# Patient Record
Sex: FEMALE | Race: BLACK OR AFRICAN AMERICAN | NOT HISPANIC OR LATINO | Employment: FULL TIME | URBAN - METROPOLITAN AREA
[De-identification: names, ages, dates, MRNs, and addresses within clinical notes are randomized per-mention and may not be internally consistent; named-entity substitution may affect disease eponyms.]

---

## 2017-01-03 ENCOUNTER — OFFICE VISIT (OUTPATIENT)
Dept: INTERNAL MEDICINE | Facility: CLINIC | Age: 57
End: 2017-01-03
Payer: MEDICAID

## 2017-01-03 VITALS
DIASTOLIC BLOOD PRESSURE: 86 MMHG | HEIGHT: 69 IN | BODY MASS INDEX: 25.57 KG/M2 | WEIGHT: 172.63 LBS | HEART RATE: 80 BPM | SYSTOLIC BLOOD PRESSURE: 110 MMHG

## 2017-01-03 DIAGNOSIS — I10 ESSENTIAL HYPERTENSION: ICD-10-CM

## 2017-01-03 DIAGNOSIS — T78.3XXS ANGIOEDEMA OF LIPS, SEQUELA: Primary | ICD-10-CM

## 2017-01-03 DIAGNOSIS — T78.3XXS ALLERGIC ANGIOEDEMA, SEQUELA: ICD-10-CM

## 2017-01-03 PROCEDURE — 99214 OFFICE O/P EST MOD 30 MIN: CPT | Mod: S$PBB,,, | Performed by: INTERNAL MEDICINE

## 2017-01-03 PROCEDURE — 99213 OFFICE O/P EST LOW 20 MIN: CPT | Mod: PBBFAC | Performed by: INTERNAL MEDICINE

## 2017-01-03 PROCEDURE — 99999 PR PBB SHADOW E&M-EST. PATIENT-LVL III: CPT | Mod: PBBFAC,,, | Performed by: INTERNAL MEDICINE

## 2017-01-03 RX ORDER — AMLODIPINE BESYLATE 5 MG/1
5 TABLET ORAL DAILY
Qty: 30 TABLET | Refills: 12 | Status: SHIPPED | OUTPATIENT
Start: 2017-01-03 | End: 2018-02-01 | Stop reason: SDUPTHER

## 2017-01-03 RX ORDER — EPINEPHRINE 0.3 MG/.3ML
1 INJECTION SUBCUTANEOUS ONCE
Qty: 2 DEVICE | Refills: 1 | Status: SHIPPED | OUTPATIENT
Start: 2017-01-03 | End: 2017-01-03

## 2017-01-03 RX ORDER — HYDROCHLOROTHIAZIDE 12.5 MG/1
12.5 TABLET ORAL DAILY
Qty: 30 TABLET | Refills: 11 | Status: SHIPPED | OUTPATIENT
Start: 2017-01-03 | End: 2018-01-03

## 2017-01-03 NOTE — PROGRESS NOTES
Subjective:       Patient ID: Leschia T Bastian is a 56 y.o. female.    Chief Complaint: Follow-up ( appt facial swelling)    HPI  Review of Systems    Objective:      Physical Exam    Assessment:       1. Angioedema of lips, sequela    2. Allergic angioedema, sequela    3. Essential hypertension        Plan:       Leschia was seen today for follow-up.    Diagnoses and all orders for this visit:    Angioedema of lips, sequela: patient will need to look in her book for Allergists  -     Ambulatory consult to Allergy    Allergic angioedema, sequela: gave prescription for Epipen:teaching by the nurse on how to use this.  -     Ambulatory consult to Allergy    Essential hypertension: lisinopril allergy, will stop losartan because of the angioedema and change to amlodipine 5 mg and hctz 12.5 mg    Other orders  -     epinephrine (EPIPEN 2-ERICH) 0.3 mg/0.3 mL AtIn; Inject 0.3 mLs (0.3 mg total) into the muscle once.  -     ranitidine (ZANTAC) 150 MG tablet; Take 1 tablet (150 mg total) by mouth 2 (two) times daily. Patient will need a prescription for this for allergic reaction  -     amlodipine (NORVASC) 5 MG tablet; Take 1 tablet (5 mg total) by mouth once daily. Stop the losartan  -     hydrochlorothiazide (HYDRODIURIL) 12.5 MG Tab; Take 1 tablet (12.5 mg total) by mouth once daily.    Return in about 4 weeks (around 1/31/2017) for Follow up.    New Prescriptions    AMLODIPINE (NORVASC) 5 MG TABLET    Take 1 tablet (5 mg total) by mouth once daily. Stop the losartan    EPINEPHRINE (EPIPEN 2-ERICH) 0.3 MG/0.3 ML ATIN    Inject 0.3 mLs (0.3 mg total) into the muscle once.    RANITIDINE (ZANTAC) 150 MG TABLET    Take 1 tablet (150 mg total) by mouth 2 (two) times daily. Patient will need a prescription for this for allergic reaction       Modified Medications    Modified Medication Previous Medication    HYDROCHLOROTHIAZIDE (HYDRODIURIL) 12.5 MG TAB hydrochlorothiazide (HYDRODIURIL) 12.5 MG Tab       Take 1 tablet (12.5 mg  total) by mouth once daily.    Take 1 tablet (12.5 mg total) by mouth once daily.       Orders Placed This Encounter   Procedures    Ambulatory consult to Allergy     Referral Priority:   Routine     Referral Type:   Allergy Testing     Referral Reason:   Specialty Services Required     Requested Specialty:   Allergy     Number of Visits Requested:   1       Labs, studies and consults associated with this visit were reviewed

## 2017-01-03 NOTE — MR AVS SNAPSHOT
[facility]  [address]  [place] [address]  Phone: [phone]  Fax: [phone]                  Leschia T Bastian   1/3/2017 10:00 AM   Office Visit    Description:  Female : 1960   Provider:  Karina Gomez MD   Department:  [facility]           Reason for Visit     Follow-up           Diagnoses this Visit        Comments    Angioedema of lips, sequela    -  Primary     Allergic angioedema, sequela         Essential hypertension                To Do List           Future Appointments        Provider Department Dept Phone    2017 3:00 PM Karina Gomez MD [facility] [phone]      Goals (5 Years of Data)     None      Follow-Up and Disposition     Return in about 4 weeks (around 2017) for Follow up.       These Medications        Disp Refills Start End    epinephrine (EPIPEN 2-ERICH) 0.3 mg/0.3 mL AtIn 2 Device 1 1/3/2017 1/3/2017    Inject 0.3 mLs (0.3 mg total) into the muscle once. - Intramuscular    Pharmacy: [place] [id number] Ph #: [phone]       ranitidine (ZANTAC) 150 MG tablet 60 tablet 11 1/3/2017 1/3/2018    Take 1 tablet (150 mg total) by mouth 2 (two) times daily. Patient will need a prescription for this for allergic reaction - Oral    Pharmacy: [place] [id number] Ph #: [phone]       amlodipine (NORVASC) 5 MG tablet 30 tablet 12 1/3/2017 1/3/2018    Take 1 tablet (5 mg total) by mouth once daily. Stop the losartan - Oral    Pharmacy: [place] [address] Ph #: [phone]       hydrochlorothiazide (HYDRODIURIL) 12.5 MG Tab 30 tablet 11 1/3/2017 1/3/2018    Take 1 tablet (12.5 mg total) by mouth once daily. - Oral    Pharmacy: [place] [id number] Ph #: [phone]         [facility]      [facility] Care [facility] - 24/7 Assistance  Registered nurses in the [facility] provide clinical advisement, health education, appointment booking, and other advisory services.  Call for this free service at [phone].             Medications           Message regarding Medications     Verify the changes and/or additions to your medication regime listed below are the same as discussed with your clinician today.  If any of these changes or additions are incorrect, please notify your healthcare provider.        START taking these NEW medications        Refills    epinephrine (EPIPEN 2-ERICH) 0.3 mg/0.3 mL AtIn 1    Sig: Inject 0.3 mLs (0.3 mg total) into the muscle once.    Class: Normal    Route: Intramuscular    ranitidine (ZANTAC) 150 MG tablet 11    Sig: Take 1 tablet (150 mg total) by mouth 2 (two) times daily. Patient will need a prescription for this for allergic reaction    Class: Normal    Route: Oral    amlodipine (NORVASC) 5 MG tablet 12    Sig: Take 1 tablet (5 mg total) by mouth once daily. Stop the losartan    Class: Normal    Route: Oral      STOP taking these medications     omeprazole (PRILOSEC) 40 MG capsule Take 40 mg by mouth once daily.    pantoprazole (PROTONIX) 20 MG tablet Take 2 tablets (40 mg total) by mouth once daily.    losartan (COZAAR) 100 MG tablet Take 1 tablet (100 mg total) by mouth once daily.           Verify that the below list of medications is an accurate representation of the medications you are currently taking.  If none reported, the list may be blank. If incorrect, please contact your healthcare provider. Carry this list with you in case of emergency.           Current Medications     acetaminophen-codeine 300-30mg (TYLENOL #3) 300-30 mg Tab Take 1 tablet by mouth every 4 to 6 hours as needed.    amoxicillin-clavulanate 875-125mg (AUGMENTIN) 875-125 mg per tablet 1 TABLET BY MOUTH TWICE A DAY TAKE WITH FOOD    aspirin-acetaminophen-caffeine 250-250-65  "mg (EXCEDRIN MIGRAINE) 250-250-65 mg per tablet Take 1 tablet by mouth every 6 (six) hours as needed for Pain.    benzonatate (TESSALON) 100 MG capsule TAKE 1-2 CAPSULES BY MOUTH THREE TIMES A DAY AS NEEDED FOR COUGH    fexofenadine (ALLEGRA) 180 MG tablet Take 1 tablet (180 mg total) by mouth once daily.    fluticasone (FLONASE) 50 mcg/actuation nasal spray USE 2 SPRAYS IN THE NOSTRILS DAILY    loratadine (CLARITIN) 10 mg tablet Take 10 mg by mouth once daily.    methylPREDNISolone (MEDROL DOSEPACK) 4 mg tablet Take as directed    naproxen sodium (ANAPROX) 220 MG tablet Take 220 mg by mouth every 12 (twelve) hours.    nystatin-triamcinolone (MYCOLOG II) cream APPLY TO AFFECTED AREA 2 TIMES DAILY    olopatadine (PATANOL) 0.1 % ophthalmic solution Place 1 drop into both eyes 2 (two) times daily.    promethazine-dextromethorphan (PROMETHAZINE-DM) 6.25-15 mg/5 mL Syrp TAKE 5 ML BY MOUTH EVERY 4 TO 6 HOURS AS NEEDED FOR COUGH    amlodipine (NORVASC) 5 MG tablet Take 1 tablet (5 mg total) by mouth once daily. Stop the losartan    epinephrine (EPIPEN 2-ERICH) 0.3 mg/0.3 mL AtIn Inject 0.3 mLs (0.3 mg total) into the muscle once.    hydrochlorothiazide (HYDRODIURIL) 12.5 MG Tab Take 1 tablet (12.5 mg total) by mouth once daily.    ranitidine (ZANTAC) 150 MG tablet Take 1 tablet (150 mg total) by mouth 2 (two) times daily. Patient will need a prescription for this for allergic reaction           Clinical Reference Information           Vital Signs - Last Recorded  Most recent update: 1/3/2017 10:10 AM by Abigail Hernandez MA    BP Pulse Ht Wt LMP BMI    110/86 80 5' 9" (1.753 m) 78.3 kg (172 lb 9.9 oz) 12/09/2016 25.49 kg/m2      Blood Pressure          Most Recent Value    BP  110/86      Allergies as of 1/3/2017     Codeine    Shrimp    Crab      Immunizations Administered on Date of Encounter - 1/3/2017     None      Orders Placed During Today's Visit      Normal Orders This Visit    Ambulatory consult to Allergy       "

## 2017-01-09 DIAGNOSIS — G47.30 SLEEP APNEA, UNSPECIFIED: Primary | ICD-10-CM

## 2017-01-11 ENCOUNTER — TELEPHONE (OUTPATIENT)
Dept: SLEEP MEDICINE | Facility: CLINIC | Age: 57
End: 2017-01-11

## 2017-01-11 DIAGNOSIS — G47.30 SLEEP APNEA, UNSPECIFIED TYPE: Primary | ICD-10-CM

## 2017-01-11 NOTE — TELEPHONE ENCOUNTER
Cherry,    Please inform the patient of the following:      Thanks!    Unfortunately, your insurance denied in lab sleep study.  Please see the letter below.  We will have to go to plan B and order a Home Study for you.  Please keep in mind that it is far inferior to in lab in terms of sensitivity, and negative home study does not mean that you do not have Sleep Apnea Disorder.    SLEEP [facility] ( [facility]) will [place] to [place]. Their number is [phone] (ext 1). Please call them if you do not hear from them in 10 business days from now.  The [facility] is located on 7th floor of the [facility] where you can  home study device.    SLEEP CLINIC (my assistant) will call you when the sleep study results are ready - if you have not heard from us by 2 weeks from the date of the study, please call [phone] (ext 2) or you can use [facility] to contact me.    You are advised to abstain from driving should you feel sleepy or drowsy.  ----------------------------------------------------------------------------------------------------  Dr. Yasmin Hernandez,     Thank you for ordering SLE3 - POLYSOMNOGRAM for patient Leschia T Bastian, MRN [id number]. Unfortunately, the patient's insurance, [employer], has denied the service due to Medical Director Denial, N/A. This is an automated In Basket notification that does not require a reply. For a more detailed explanation, or for questions regarding this insurance denial, call the [facility] Pre[facility] department at [phone] and reference referral ID [id number].The Pre-Service department hours are M-F 8 a.m. to 5 p.m.      Thank you,     [facility]

## 2017-01-12 ENCOUNTER — TELEPHONE (OUTPATIENT)
Dept: SLEEP MEDICINE | Facility: CLINIC | Age: 57
End: 2017-01-12

## 2017-02-01 ENCOUNTER — OFFICE VISIT (OUTPATIENT)
Dept: INTERNAL MEDICINE | Facility: CLINIC | Age: 57
End: 2017-02-01
Payer: MEDICAID

## 2017-02-01 VITALS
WEIGHT: 170 LBS | OXYGEN SATURATION: 95 % | SYSTOLIC BLOOD PRESSURE: 121 MMHG | BODY MASS INDEX: 25.18 KG/M2 | HEART RATE: 84 BPM | DIASTOLIC BLOOD PRESSURE: 78 MMHG | HEIGHT: 69 IN

## 2017-02-01 DIAGNOSIS — T78.40XD ALLERGY, SUBSEQUENT ENCOUNTER: ICD-10-CM

## 2017-02-01 DIAGNOSIS — I10 ESSENTIAL HYPERTENSION: Primary | ICD-10-CM

## 2017-02-01 PROCEDURE — 99213 OFFICE O/P EST LOW 20 MIN: CPT | Mod: S$PBB,,, | Performed by: INTERNAL MEDICINE

## 2017-02-01 PROCEDURE — 99213 OFFICE O/P EST LOW 20 MIN: CPT | Mod: PBBFAC | Performed by: INTERNAL MEDICINE

## 2017-02-01 PROCEDURE — 99999 PR PBB SHADOW E&M-EST. PATIENT-LVL III: CPT | Mod: PBBFAC,,, | Performed by: INTERNAL MEDICINE

## 2017-02-01 NOTE — PROGRESS NOTES
Subjective:       Patient ID: Leschia T Bastian is a 56 y.o. female.    Chief Complaint: Follow-up    HPI   1. Hypertension better controlled off of losartan on amlodipine 5 mg and hctz 12.5 with no side effects or swelling,    2. Patient has completed allergy testing , allergic to clams and shrimp. She does have an Epi pen. She is on Zantac twice a day and Allegra.    3. New problem: when she was losing her hair. Discussed finding a  Dermatologist who specializes in hair    Review of Systems   Constitutional: Negative for activity change, chills, fever and unexpected weight change.   Respiratory: Negative for cough, chest tightness, shortness of breath and wheezing.    Cardiovascular: Negative for chest pain, palpitations and leg swelling.       Objective:      Physical Exam   Constitutional: She appears well-developed and well-nourished.   Neck: No JVD present. No thyromegaly present.   Cardiovascular: Normal rate, normal heart sounds and intact distal pulses.    Pulmonary/Chest: Effort normal and breath sounds normal. No respiratory distress.       Assessment:       1. Essential hypertension    2. Allergy, subsequent encounter        Plan:        Leschia was seen today for follow-up.    Diagnoses and all orders for this visit:    Essential hypertension: well controlled on current medication    Allergy, subsequent encounter: allergic to clams and shrimp, on H2 and H1 and has Epi pen    Follow up as needed

## 2017-02-01 NOTE — MR AVS SNAPSHOT
[facility]  [address]  [facility] [address]  Phone: [phone]  Fax: [phone]                  Leschia T Bastian   2017 3:00 PM   Office Visit    Description:  Female : 1960   Provider:  Karina Gomez MD   Department:  [facility]           Reason for Visit     Follow-up           Diagnoses this Visit        Comments    Essential hypertension    -  Primary     Allergy, subsequent encounter                To Do List           Goals (5 Years of Data)     None      [facility]     [facility] Nurse Care [facility] -  Assistance  Registered nurses in the [facility] provide clinical advisement, health education, appointment booking, and other advisory services.  Call for this free service at [phone].             Medications           Message regarding Medications     Verify the changes and/or additions to your medication regime listed below are the same as discussed with your clinician today.  If any of these changes or additions are incorrect, please notify your healthcare provider.             Verify that the below list of medications is an accurate representation of the medications you are currently taking.  If none reported, the list may be blank. If incorrect, please contact your healthcare provider. Carry this list with you in case of emergency.           Current Medications     acetaminophen-codeine 300-30mg (TYLENOL #3) 300-30 mg Tab Take 1 tablet by mouth every 4 to 6 hours as needed.    amlodipine (NORVASC) 5 MG tablet Take 1 tablet (5 mg total) by mouth once daily. Stop the losartan    amoxicillin-clavulanate 875-125mg (AUGMENTIN) 875-125 mg per tablet 1 TABLET BY MOUTH TWICE A DAY TAKE WITH FOOD    aspirin-acetaminophen-caffeine 250-250-65 mg (EXCEDRIN MIGRAINE) 250-250-65 mg per tablet Take 1 tablet by mouth every 6 (six) hours as needed for Pain.    benzonatate (TESSALON) 100 MG capsule TAKE 1-2 CAPSULES BY  "MOUTH THREE TIMES A DAY AS NEEDED FOR COUGH    fluticasone (FLONASE) 50 mcg/actuation nasal spray USE 2 SPRAYS IN THE NOSTRILS DAILY    hydrochlorothiazide (HYDRODIURIL) 12.5 MG Tab Take 1 tablet (12.5 mg total) by mouth once daily.    loratadine (CLARITIN) 10 mg tablet Take 10 mg by mouth once daily.    methylPREDNISolone (MEDROL DOSEPACK) 4 mg tablet Take as directed    naproxen sodium (ANAPROX) 220 MG tablet Take 220 mg by mouth every 12 (twelve) hours.    nystatin-triamcinolone (MYCOLOG II) cream APPLY TO AFFECTED AREA 2 TIMES DAILY    olopatadine (PATANOL) 0.1 % ophthalmic solution Place 1 drop into both eyes 2 (two) times daily.    promethazine-dextromethorphan (PROMETHAZINE-DM) 6.25-15 mg/5 mL Syrp TAKE 5 ML BY MOUTH EVERY 4 TO 6 HOURS AS NEEDED FOR COUGH    ranitidine (ZANTAC) 150 MG tablet Take 1 tablet (150 mg total) by mouth 2 (two) times daily. Patient will need a prescription for this for allergic reaction    epinephrine (EPIPEN 2-ERICH) 0.3 mg/0.3 mL AtIn Inject 0.3 mLs (0.3 mg total) into the muscle once.    fexofenadine (ALLEGRA) 180 MG tablet Take 1 tablet (180 mg total) by mouth once daily.           Clinical Reference Information           Vital Signs - Last Recorded  Most recent update: 2/1/2017  2:30 PM by Milena Ramírez MA    BP Pulse Ht Wt LMP SpO2    121/78 84 5' 9" (1.753 m) 77.1 kg (169 lb 15.6 oz) 12/09/2016 95%    BMI                25.1 kg/m2          Blood Pressure          Most Recent Value    BP  121/78      Allergies as of 2/1/2017     Clams    Codeine    Shrimp    Crab      Immunizations Administered on Date of Encounter - 2/1/2017     None      "

## 2017-02-03 ENCOUNTER — TELEPHONE (OUTPATIENT)
Dept: SLEEP MEDICINE | Facility: OTHER | Age: 57
End: 2017-02-03

## 2017-02-20 ENCOUNTER — TELEPHONE (OUTPATIENT)
Dept: SLEEP MEDICINE | Facility: CLINIC | Age: 57
End: 2017-02-20

## 2017-03-13 ENCOUNTER — TELEPHONE (OUTPATIENT)
Dept: SLEEP MEDICINE | Facility: OTHER | Age: 57
End: 2017-03-13

## 2017-03-14 ENCOUNTER — HOSPITAL ENCOUNTER (OUTPATIENT)
Dept: SLEEP MEDICINE | Facility: OTHER | Age: 57
Discharge: HOME | End: 2017-03-14
Attending: PSYCHIATRY & NEUROLOGY
Payer: MEDICAID

## 2017-03-14 DIAGNOSIS — G47.30 SLEEP APNEA, UNSPECIFIED TYPE: ICD-10-CM

## 2017-03-14 DIAGNOSIS — G47.33 OSA (OBSTRUCTIVE SLEEP APNEA): ICD-10-CM

## 2017-03-14 PROCEDURE — 95806 SLEEP STUDY UNATT&RESP EFFT: CPT | Mod: 26,,, | Performed by: PSYCHIATRY & NEUROLOGY

## 2017-03-14 PROCEDURE — 95800 SLP STDY UNATTENDED: CPT

## 2017-03-23 ENCOUNTER — TELEPHONE (OUTPATIENT)
Dept: SLEEP MEDICINE | Facility: CLINIC | Age: 57
End: 2017-03-23

## 2017-03-24 ENCOUNTER — TELEPHONE (OUTPATIENT)
Dept: SLEEP MEDICINE | Facility: CLINIC | Age: 57
End: 2017-03-24

## 2017-03-24 NOTE — TELEPHONE ENCOUNTER
Please schedule for the follow up with MD / NP  to review sleep study results.  Positive for significant sleep apnea.    Thanks!

## 2017-03-29 ENCOUNTER — OFFICE VISIT (OUTPATIENT)
Dept: SLEEP MEDICINE | Facility: CLINIC | Age: 57
End: 2017-03-29
Payer: MEDICAID

## 2017-03-29 VITALS
BODY MASS INDEX: 25.24 KG/M2 | HEART RATE: 80 BPM | SYSTOLIC BLOOD PRESSURE: 110 MMHG | WEIGHT: 170.44 LBS | DIASTOLIC BLOOD PRESSURE: 76 MMHG | HEIGHT: 69 IN

## 2017-03-29 DIAGNOSIS — G47.33 OBSTRUCTIVE SLEEP APNEA: Primary | ICD-10-CM

## 2017-03-29 PROCEDURE — 99213 OFFICE O/P EST LOW 20 MIN: CPT | Mod: PBBFAC | Performed by: NURSE PRACTITIONER

## 2017-03-29 PROCEDURE — 99999 PR PBB SHADOW E&M-EST. PATIENT-LVL III: CPT | Mod: PBBFAC,,, | Performed by: NURSE PRACTITIONER

## 2017-03-29 PROCEDURE — 99214 OFFICE O/P EST MOD 30 MIN: CPT | Mod: S$PBB,,, | Performed by: NURSE PRACTITIONER

## 2017-03-29 NOTE — PATIENT INSTRUCTIONS

## 2017-03-29 NOTE — MR AVS SNAPSHOT
[facility]  [address]  [facility] [address]  Phone: [phone]                  Leschia T Bastian   3/29/2017 2:20 PM   Office Visit    Description:  Female : 1960   Provider:  Clara Matute NP   Department:  [facility]           Diagnoses this Visit        Comments    Obstructive sleep apnea    -  Primary            To Do List           Goals (5 Years of Data)     None      Follow-Up and Disposition     Return for 4-5wks AFTER setup.      [facility] On [facility]     [facility] On Call Nurse Care Line -  Assistance  Registered nurses in the [facility] On Call Center provide clinical advisement, health education, appointment booking, and other advisory services.  Call for this free service at [phone].             Medications           Message regarding Medications     Verify the changes and/or additions to your medication regime listed below are the same as discussed with your clinician today.  If any of these changes or additions are incorrect, please notify your healthcare provider.        STOP taking these medications     amoxicillin-clavulanate 875-125mg (AUGMENTIN) 875-125 mg per tablet 1 TABLET BY MOUTH TWICE A DAY TAKE WITH FOOD    benzonatate (TESSALON) 100 MG capsule TAKE 1-2 CAPSULES BY MOUTH THREE TIMES A DAY AS NEEDED FOR COUGH    fluticasone (FLONASE) 50 mcg/actuation nasal spray USE 2 SPRAYS IN THE NOSTRILS DAILY    fexofenadine (ALLEGRA) 180 MG tablet Take 1 tablet (180 mg total) by mouth once daily.    loratadine (CLARITIN) 10 mg tablet Take 10 mg by mouth once daily.    methylPREDNISolone (MEDROL DOSEPACK) 4 mg tablet Take as directed    olopatadine (PATANOL) 0.1 % ophthalmic solution Place 1 drop into both eyes 2 (two) times daily.    promethazine-dextromethorphan (PROMETHAZINE-DM) 6.25-15 mg/5 mL Syrp TAKE 5 ML BY MOUTH EVERY 4 TO 6 HOURS AS NEEDED FOR COUGH           Verify that the below list of medications is an accurate  "representation of the medications you are currently taking.  If none reported, the list may be blank. If incorrect, please contact your healthcare provider. Carry this list with you in case of emergency.           Current Medications     acetaminophen-codeine 300-30mg (TYLENOL #3) 300-30 mg Tab Take 1 tablet by mouth every 4 to 6 hours as needed.    amlodipine (NORVASC) 5 MG tablet Take 1 tablet (5 mg total) by mouth once daily. Stop the losartan    aspirin-acetaminophen-caffeine 250-250-65 mg (EXCEDRIN MIGRAINE) 250-250-65 mg per tablet Take 1 tablet by mouth every 6 (six) hours as needed for Pain.    hydrochlorothiazide (HYDRODIURIL) 12.5 MG Tab Take 1 tablet (12.5 mg total) by mouth once daily.    naproxen sodium (ANAPROX) 220 MG tablet Take 220 mg by mouth every 12 (twelve) hours.    nystatin-triamcinolone (MYCOLOG II) cream APPLY TO AFFECTED AREA 2 TIMES DAILY    ranitidine (ZANTAC) 150 MG tablet Take 1 tablet (150 mg total) by mouth 2 (two) times daily. Patient will need a prescription for this for allergic reaction    epinephrine (EPIPEN 2-ERICH) 0.3 mg/0.3 mL AtIn Inject 0.3 mLs (0.3 mg total) into the muscle once.           Clinical Reference Information           Your Vitals Were     BP Pulse Height Weight BMI    110/76 80 5' 9" (1.753 m) 77.3 kg (170 lb 6.7 oz) 25.17 kg/m2      Blood Pressure          Most Recent Value    BP  110/76      Allergies as of 3/29/2017     Clams    Codeine    Shrimp    Crab      Immunizations Administered on Date of Encounter - 3/29/2017     None      Orders Placed During Today's Visit      Normal Orders This Visit    CPAP FOR HOME USE       Instructions      Obstructive Sleep Apnea  Obstructive sleep apnea is a condition that causes your air passages to become narrowed or blocked during sleep. As a result, breathing stops for short periods. Your body wakes up enough for breathing to begin again, though you don't remember it. The cycle of stopped breathing and brief awakenings " can repeat dozens of times a night. This prevents the body from getting to the deeper stages of sleep that are needed for good rest.  Signs of sleep apnea include loud snoring, noisy breathing, and gasping sounds during sleep. Daytime symptoms include waking up tired after a full night's sleep, waking up with headaches, feeling very sleepy or falling asleep during the day, and having problems with memory or concentration.  Risk factors for sleep apnea include:  · Being overweight  · Being a man, or a woman in menopause  · Smoking  · Using alcohol or sedating medications or herbs  · Having enlarged structures in the nose or throat  Home care  Lifestyle changes that can help treat snoring and sleep apnea include the following:  · If you are overweight, lose weight. Talk to your healthcare provider about a weight-loss plan for you.  · Avoid alcohol for 3 to 4 hours before bedtime. Avoid sedating medications. Ask your healthcare provider about the medications you take.  · If you smoke, talk to your healthcare provider about ways to quit.  · Sleep on your side. This can help prevent gravity from pulling relaxed throat tissues into your breathing passages.  · If you have allergies or sinus problems that block your nose, ask your healthcare provider for help.  Follow up  Follow up with your healthcare provider as advised. A diagnosis of sleep apnea is made with a sleep study. Your healthcare provider can tell you more about this test.  When to seek medical care  Sleep apnea can make you more likely to have certain health problems. These include high blood pressure, heart attack, stroke, and sexual dysfunction. If you have sleep apnea, talk to your healthcare provider about the best treatments for you.  Date Last Reviewed: 5/3/2015  © 8215-3462 [employer]. [address] [place] [address], [place] [address]. All rights reserved. This information is not intended as a substitute for professional medical care. Always  follow your healthcare professional's instructions.             Language Assistance Services     ATTENTION: Language assistance services are available, free of charge. Please call [phone].      ATENCIÓN: Si habla katerina, tiene a lott disposición servicios gratuitos de asistencia lingüística. Llame al [phone].     CHÚ Ý: N?u b?n nói Ti?ng Vi?t, có các d?ch v? h? tr? ngôn ng? mi?n phí dành cho b?n. G?i s? [phone].         [facility] complies with applicable Federal civil rights laws and does not discriminate on the basis of race, color, national origin, age, disability, or sex.

## 2017-03-29 NOTE — PROGRESS NOTES
Leschia Bastian  was seen as f/u today for the mgt of JAYLON. Last seen by MD Hernandez 12/9/16, this is my initial visit with her.     She has since undergone a home sleep study which was reviewed with her today. She continues to report difficulty staying asleep, wakes up after 2-3 hr then up for the day. She walks in am. Has lost 6# in interim. She is always tired. Wears mouth guard but still opening mouth. Watches tv in bed ~ 9-10p. ESS=18. Sleeps mainly on her back. Nocturia. Sleeps alone. Recurrent episode CP recently.     HST 3/14/17 AHI 13(RDI 28)/low sat 81.4%      HISTORY:  12/09/2016 INITIAL HISTORY OF PRESENT ILLNESS:  Leschia T Bastian is a 56 y.o. female is here to be evaluated for a sleep disorder.       CHIEF COMPLAINT:      The patient's complaints include excessive daytime sleepiness, excessive daytime fatigue, snoring,  gasping for air in sleep and interrupted sleep since  6 years.    SOB  1 ER visit for chest pain.    Reports  dry mouth and sore throat  Reports occasional nasal congestion   Reports  morning headaches  Reports  interrupted sleep  Denies frequent leg movements  Denies symptoms concerning for parasomnia    The ESS ([place] Sleepiness Score) taken on initial visit is 14 /24      SLEEP ROUTINE AND LIFESTYLE 03/29/2017 :    Occupation:unemployed.    Bed partner:  since 5 year ago - was taking Xanax     Time to bed - wake up time on a workday : 9-10 PM to 6 AM  Time to bed - wake up time on a day off: 9-10 PM to 6 AM  Sleep onset latency: 30 min - 1 hour  Disruptions or awakenings: 3-4  Time to fall back into sleep: 1-2 hour   Perceived sleep quality: 2/5  Perceived total sleep time:  5  hours.  Daytime naps: 2    Exercise routine: sometimes  Caffeine:  stopped     PAST MEDICAL HISTORY:    Active Ambulatory Problems     Diagnosis Date Noted    Hypertension     GERD (gastroesophageal reflux disease) 09/27/2012    Anxiety 09/27/2012    JAYLON (obstructive sleep apnea)                "               REVIEW OF SYSTEMS:   Sleep related symptoms as per HPI, dyspnea, wgt loss, acid reflux. Otherwise a balance review of 10-systems is negative.       PHYSICAL EXAM:  /76  Pulse 80  Ht 5' 9" (1.753 m)  Wt 77.3 kg (170 lb 6.7 oz)  BMI 25.17 kg/m2  GENERAL: Overweight body habitus, well groomed.      ASSESSMENT:    1. JAYLON, mild, moderate by RDI criteria. Ready to trial autoPAP therapy. She has medical co-morbidities of hypertension, GERD, which can be worsened by JAYLON.       PLAN:  We discussed potential treatment options, which could include weight loss (10-15%), body positioning, continuous positive airway pressure (CPAP-defintive), mandibular advancement splint by dentist. Discussed etiology of JAYLON and potential ramifications of untreated JAYLON, including heart disease, hypertension, cognitive difficulties, stroke, and diabetes.      APAP 6-16cm setup, pending authorization. [facility]. Showed few masks/machines/discsused plan of care. RTC 4-5 wks AFTER setup adherence monitoring.     Do not drive sleepy    Discussed good sleep hygiene, provided literature. Avoid bedroom until sleepy/no tv watching.   "

## 2017-04-13 ENCOUNTER — OFFICE VISIT (OUTPATIENT)
Dept: OBSTETRICS AND GYNECOLOGY | Facility: CLINIC | Age: 57
End: 2017-04-13
Payer: MEDICAID

## 2017-04-13 VITALS
BODY MASS INDEX: 24.5 KG/M2 | DIASTOLIC BLOOD PRESSURE: 70 MMHG | HEIGHT: 69 IN | SYSTOLIC BLOOD PRESSURE: 120 MMHG | WEIGHT: 165.38 LBS

## 2017-04-13 DIAGNOSIS — N64.9 BREAST LESION: Primary | ICD-10-CM

## 2017-04-13 PROCEDURE — 99213 OFFICE O/P EST LOW 20 MIN: CPT | Mod: S$PBB,,, | Performed by: OBSTETRICS & GYNECOLOGY

## 2017-04-13 PROCEDURE — 99213 OFFICE O/P EST LOW 20 MIN: CPT | Mod: PBBFAC | Performed by: OBSTETRICS & GYNECOLOGY

## 2017-04-13 PROCEDURE — 99999 PR PBB SHADOW E&M-EST. PATIENT-LVL III: CPT | Mod: PBBFAC,,, | Performed by: OBSTETRICS & GYNECOLOGY

## 2017-04-13 RX ORDER — HYDROCHLOROTHIAZIDE 12.5 MG/1
CAPSULE ORAL
COMMUNITY
Start: 2017-03-18 | End: 2017-04-25 | Stop reason: SDUPTHER

## 2017-04-13 RX ORDER — LOSARTAN POTASSIUM 100 MG/1
TABLET ORAL
COMMUNITY
Start: 2017-03-18 | End: 2017-04-25

## 2017-04-17 NOTE — PROGRESS NOTES
HISTORY OF PRESENT ILLNESS:    Leschia T Bastian is a 56 y.o. female  Patient's last menstrual period was 03/15/2017. presents today complaining of mole on her breast with itching.     Past Medical History:   Diagnosis Date    Cervical radiculopathy     GERD (gastroesophageal reflux disease)     Hypertension     Lumbar radiculopathy     Seasonal allergies        Past Surgical History:   Procedure Laterality Date    BUNIONECTOMY      GALLBLADDER SURGERY      TUBAL LIGATION      UMBILICAL HIDRADENITIS EXCISION         MEDICATIONS AND ALLERGIES:      Current Outpatient Prescriptions:     acetaminophen-codeine 300-30mg (TYLENOL #3) 300-30 mg Tab, Take 1 tablet by mouth every 4 to 6 hours as needed., Disp: , Rfl: 0    amlodipine (NORVASC) 5 MG tablet, Take 1 tablet (5 mg total) by mouth once daily. Stop the losartan, Disp: 30 tablet, Rfl: 12    aspirin-acetaminophen-caffeine 250-250-65 mg (EXCEDRIN MIGRAINE) 250-250-65 mg per tablet, Take 1 tablet by mouth every 6 (six) hours as needed for Pain., Disp: , Rfl:     hydrochlorothiazide (HYDRODIURIL) 12.5 MG Tab, Take 1 tablet (12.5 mg total) by mouth once daily., Disp: 30 tablet, Rfl: 11    hydrochlorothiazide (MICROZIDE) 12.5 mg capsule, , Disp: , Rfl:     losartan (COZAAR) 100 MG tablet, , Disp: , Rfl:     naproxen sodium (ANAPROX) 220 MG tablet, Take 220 mg by mouth every 12 (twelve) hours., Disp: , Rfl:     ranitidine (ZANTAC) 150 MG tablet, Take 1 tablet (150 mg total) by mouth 2 (two) times daily. Patient will need a prescription for this for allergic reaction, Disp: 60 tablet, Rfl: 11    epinephrine (EPIPEN 2-ERICH) 0.3 mg/0.3 mL AtIn, Inject 0.3 mLs (0.3 mg total) into the muscle once., Disp: 2 Device, Rfl: 1    nystatin-triamcinolone (MYCOLOG II) cream, APPLY TO AFFECTED AREA 2 TIMES DAILY, Disp: 15 g, Rfl: 0    Review of patient's allergies indicates:   Allergen Reactions    Clams      Allergy testing confirmed    Codeine Itching     Shrimp      Peeling shrimp, got a rash, allergy test confirmed    Crab Rash     Throat closed       COMPREHENSIVE GYN HISTORY:  PAP History: Denies abnormal Paps.  Infection History: Denies STDs. Denies PID.  Benign History: Denies uterine fibroids. Denies ovarian cysts. Denies endometriosis. Denies other conditions.  Cancer History: Denies cervical cancer. Denies uterine cancer or hyperplasia. Denies ovarian cancer. Denies vulvar cancer or pre-cancer. Denies vaginal cancer or pre-cancer. Denies breast cancer. Denies colon cancer.  Sexual Activity History: Reports currently being sexually active  Menstrual History: Every 28 days, flows for 4 days. Light flow.  Dysmenorrhea History: Denies dysmenorrhea.  Contraception History:      ROS:  GENERAL: No fever or chills.  BREASTS: No pain. No lumps. No discharge.  ABDOMEN: No pain. No nausea. No vomiting. No diarrhea. No constipation.  REPRODUCTIVE: No abnormal bleeding.   VULVA: No pain. No lesions. No itching.  VAGINA: No relaxation. No itching. No odor. No discharge. No lesions.  URINARY: No incontinence. No nocturia. No frequency. No dysuria.    PE:  APPEARANCE: Well nourished, well developed, in no acute distress.  AFFECT: WNL, alert and oriented x 3.  ABDOMEN: Soft. No tenderness or masses. No hepatosplenomegaly. No hernias.  BREASTS: Symmetrical, no skin changes or visible lesions. No palpable masses, nipple discharge bilaterally.  Left breast with area larger than 6 mm, discolored with itching.       1. Breast lesion      FOLLOW-UP with me for annual exam  Consult placed for Breast consult

## 2017-04-25 ENCOUNTER — OFFICE VISIT (OUTPATIENT)
Dept: SURGERY | Facility: CLINIC | Age: 57
End: 2017-04-25
Payer: MEDICAID

## 2017-04-25 VITALS
SYSTOLIC BLOOD PRESSURE: 143 MMHG | HEIGHT: 69 IN | BODY MASS INDEX: 25.74 KG/M2 | TEMPERATURE: 98 F | DIASTOLIC BLOOD PRESSURE: 96 MMHG | HEART RATE: 69 BPM | WEIGHT: 173.81 LBS

## 2017-04-25 DIAGNOSIS — L29.9 ITCHING: ICD-10-CM

## 2017-04-25 DIAGNOSIS — L82.1 SEBORRHEIC KERATOSIS: Primary | ICD-10-CM

## 2017-04-25 DIAGNOSIS — Z80.3 FAMILY HISTORY OF BREAST CANCER: ICD-10-CM

## 2017-04-25 PROCEDURE — 99999 PR PBB SHADOW E&M-EST. PATIENT-LVL III: CPT | Mod: PBBFAC,,, | Performed by: NURSE PRACTITIONER

## 2017-04-25 PROCEDURE — 99213 OFFICE O/P EST LOW 20 MIN: CPT | Mod: PBBFAC,PO | Performed by: NURSE PRACTITIONER

## 2017-04-25 PROCEDURE — 99203 OFFICE O/P NEW LOW 30 MIN: CPT | Mod: S$PBB,,, | Performed by: NURSE PRACTITIONER

## 2017-04-25 NOTE — LETTER
April 25, 2017      Nidia Bradley MD  [address]  [address]  [place] [address]           [facility]  [address] [address]  Phone: [phone]          Patient: Leschia T Bastian   MR Number: [id number]   YOB: 1960   Date of Visit: 4/25/2017       Dear Dr. Nidia Bradley:    Thank you for referring Leschia Bastian to me for evaluation. Attached you will find relevant portions of my assessment and plan of care.    If you have questions, please do not hesitate to call me. I look forward to following Leschia Bastian along with you.    Sincerely,    Soumya Domingo, STUARTP    Enclosure  CC:  No Recipients    If you would like to receive this communication electronically, please contact [email or web address] or [phone] to request more information on [facility] Link access.    For providers and/or their staff who would like to refer a patient to [facility], please contact us through our one-stop-shop provider referral line, [facility], at [phone].    If you feel you have received this communication in error or would no longer like to receive these types of communications, please e-mail [email or web address]

## 2017-04-25 NOTE — PROGRESS NOTES
"Subjective:      Patient ID: Leschia T Bastian is a 56 y.o. female.    Chief Complaint: Breast Cancer Screening (CBE/Eval Left Breast Lesion/Family Hx of Breast Cancer)      HPI: (PF, EPF - 1-3) (Detailed, Comp, - 4) new patient referred by Dr Bradley for itching left breast, onset 3-4 months ago, intermittent, also a "mole which looks different". She states the itching has improved with using a "cream from my friend", "there was itching and a rash under my left breast then it went away". Also reports itching involving her upper chest and neck without rash reported. Denies breast mass or changes, nipple discharge, previous breast surgery     Last mmg 2016 with no abnormality     Menarche at 13   first at 22  LMP 2017, irregular, no HRT       Review of Systems  Objective:   Physical Exam   Pulmonary/Chest: She exhibits no mass, no tenderness, no laceration, no edema, no deformity, no swelling and no retraction. Right breast exhibits no inverted nipple, no mass, no nipple discharge, no skin change and no tenderness. Left breast exhibits no inverted nipple, no mass, no nipple discharge and no tenderness. Breasts are symmetrical. There is no breast swelling.   There is a oval, darkened, dry, slightly raised macule left lateral areolar border at 8 o'clock measuring 8mm consistent with seborrheic keratosis. No rash or other skin changes noted, no skin thickening, redness, increased warmth. The left nipple appears normal and symmetrical.  No upper extremity lymphedema. Breathing non-labored    Lymphadenopathy:     She has no cervical adenopathy.     She has no axillary adenopathy.        Right: No supraclavicular adenopathy present.        Left: No supraclavicular adenopathy present.     Assessment:       1. Seborrheic keratosis    2. Itching        Plan:       Reassurance provided, no evidence of breast cancer. Was recommended to f/u with dermatology for any additional skin concerns and c/o itching involving " her breast, upper chest and neck without rash or skin changes appreciated on exam today. Discussed family history of reported breast and ovarian cancer, genetic testing for HBOC recommended for her mother or sister affected with breast cancer, if negative no additional recommendations for this patient other than annual CBE and mammogram, if positive then single site testing for this patient is recommended. She states she will discuss with her family.   Return prn any future breast changes or concerns, mmg due in July and can f/u with her GYN for this

## 2017-05-17 ENCOUNTER — OFFICE VISIT (OUTPATIENT)
Dept: SLEEP MEDICINE | Facility: CLINIC | Age: 57
End: 2017-05-17
Payer: MEDICAID

## 2017-05-17 VITALS
SYSTOLIC BLOOD PRESSURE: 110 MMHG | HEIGHT: 69 IN | WEIGHT: 170.88 LBS | DIASTOLIC BLOOD PRESSURE: 80 MMHG | HEART RATE: 64 BPM | BODY MASS INDEX: 25.31 KG/M2

## 2017-05-17 DIAGNOSIS — G47.33 OBSTRUCTIVE SLEEP APNEA: Primary | ICD-10-CM

## 2017-05-17 PROCEDURE — 99999 PR PBB SHADOW E&M-EST. PATIENT-LVL III: CPT | Mod: PBBFAC,,, | Performed by: NURSE PRACTITIONER

## 2017-05-17 PROCEDURE — 99213 OFFICE O/P EST LOW 20 MIN: CPT | Mod: S$PBB,,, | Performed by: NURSE PRACTITIONER

## 2017-05-17 PROCEDURE — 99213 OFFICE O/P EST LOW 20 MIN: CPT | Mod: PBBFAC | Performed by: NURSE PRACTITIONER

## 2017-05-17 NOTE — MR AVS SNAPSHOT
[facility]  [address]  [place] [address]  Phone: [phone]                  Leschia T Bastian   2017 1:40 PM   Office Visit    Description:  Female : 1960   Provider:  Clara Matute NP   Department:  [facility]           Reason for Visit     Sleep Apnea           Diagnoses this Visit        Comments    Obstructive sleep apnea    -  Primary            To Do List           Future Appointments        Provider Department Dept Phone    2017 9:30 AM Karina Gomez MD [facility] [phone]      Goals (5 Years of Data)     None      Follow-Up and Disposition     Return in about 8 weeks (around 7/10/2017) for 0840 apap mgt.      [facility]     [facility] On Call Nurse Care Line -  Assistance  Unless otherwise directed by your provider, please contact [facility], our nurse care line that is available for  assistance.     Registered nurses in the [facility] On Call Center provide: appointment scheduling, clinical advisement, health education, and other advisory services.  Call: [phone] (toll free)               Medications           Message regarding Medications     Verify the changes and/or additions to your medication regime listed below are the same as discussed with your clinician today.  If any of these changes or additions are incorrect, please notify your healthcare provider.             Verify that the below list of medications is an accurate representation of the medications you are currently taking.  If none reported, the list may be blank. If incorrect, please contact your healthcare provider. Carry this list with you in case of emergency.           Current Medications     acetaminophen-codeine 300-30mg (TYLENOL #3) 300-30 mg Tab Take 1 tablet by mouth every 4 to 6 hours as needed.    amlodipine (NORVASC) 5 MG tablet Take 1 tablet (5 mg total) by mouth once daily. Stop the losartan     "aspirin-acetaminophen-caffeine 250-250-65 mg (EXCEDRIN MIGRAINE) 250-250-65 mg per tablet Take 1 tablet by mouth every 6 (six) hours as needed for Pain.    FEXOFENADINE HCL (ALLER-EASE ORAL) Take by mouth.    hydrochlorothiazide (HYDRODIURIL) 12.5 MG Tab Take 1 tablet (12.5 mg total) by mouth once daily.    naproxen sodium (ANAPROX) 220 MG tablet Take 220 mg by mouth every 12 (twelve) hours.    nystatin-triamcinolone (MYCOLOG II) cream APPLY TO AFFECTED AREA 2 TIMES DAILY    ranitidine (ZANTAC) 150 MG tablet Take 1 tablet (150 mg total) by mouth 2 (two) times daily. Patient will need a prescription for this for allergic reaction    epinephrine (EPIPEN 2-ERICH) 0.3 mg/0.3 mL AtIn Inject 0.3 mLs (0.3 mg total) into the muscle once.           Clinical Reference Information           Your Vitals Were     BP Pulse Height Weight BMI    110/80 64 5' 9" (1.753 m) 77.5 kg (170 lb 13.7 oz) 25.23 kg/m2      Blood Pressure          Most Recent Value    BP  110/80      Allergies as of 5/17/2017     Clams    Codeine    Shrimp    Crab      Immunizations Administered on Date of Encounter - 5/17/2017     None      Language Assistance Services     ATTENTION: Language assistance services are available, free of charge. Please call [phone].      ATENCIÓN: Si lawrence borges, tiene a lott disposición servicios gratuitos de asistencia lingüística. Llame al [phone].     [facility] Ý: N?u b?n nói Ti?ng Vi?t, có các d?ch v? h? tr? ngôn ng? mi?n phí dành cho b?n. G?i s? [phone].         [facility] complies with applicable Federal civil rights laws and does not discriminate on the basis of race, color, national origin, age, disability, or sex.        "

## 2017-05-17 NOTE — PROGRESS NOTES
Leschia Bastian  was seen as f/u today for the mgt of JAYLON. Last seen 3/29/17:    Since last seen she has been setup with APAP 6-16cm. Falling asleep earlier now around 9-10p, ready for bed. Having mild air leaks. Using it nightly except just 3-4 nights since setup. No more snoring. Less disrupted sleep now, less nocturia. Likes dreamwear mask. No chin strap. Denies pressure intolerance. Denies nasal drying. Denies oral drying. ESS=10    Interrogation- new machine condition, 30davg 5.1h/n. 90 %tile 8.4-9, 0% periodic, heat at 3-4. 23/30d>4h. AHI 8.6-11          HISTORY:  12/09/2016 INITIAL HISTORY OF PRESENT ILLNESS:  Leschia T Bastian is a 56 y.o. female is here to be evaluated for a sleep disorder.       CHIEF COMPLAINT:      The patient's complaints include excessive daytime sleepiness, excessive daytime fatigue, snoring,  gasping for air in sleep and interrupted sleep since  6 years.    SOB  1 ER visit for chest pain.    Reports  dry mouth and sore throat  Reports occasional nasal congestion   Reports  morning headaches  Reports  interrupted sleep  Denies frequent leg movements  Denies symptoms concerning for parasomnia    The ESS ([place] Sleepiness Score) taken on initial visit is 14 /24      SLEEP ROUTINE AND LIFESTYLE 05/17/2017 :    Occupation:unemployed.    Bed partner:  since 5 year ago - was taking Xanax     Time to bed - wake up time on a workday : 9-10 PM to 6 AM  Time to bed - wake up time on a day off: 9-10 PM to 6 AM  Sleep onset latency: 30 min - 1 hour  Disruptions or awakenings: 3-4  Time to fall back into sleep: 1-2 hour   Perceived sleep quality: 2/5  Perceived total sleep time:  5  hours.  Daytime naps: 2    Exercise routine: sometimes  Caffeine:  Stopped    3/29/17:  She has since undergone a home sleep study which was reviewed with her today. She continues to report difficulty staying asleep, wakes up after 2-3 hr then up for the day. She walks in am. Has lost 6# in interim. She  "is always tired. Wears mouth guard but still opening mouth. Watches tv in bed ~ 9-10p. ESS=18. Sleeps mainly on her back. Nocturia. Sleeps alone. Recurrent episode CP recently.     HST 3/14/17 AHI 13(RDI 28)/low sat 81.4%       PAST MEDICAL HISTORY:    Active Ambulatory Problems     Diagnosis Date Noted    Hypertension     GERD (gastroesophageal reflux disease) 09/27/2012    Anxiety 09/27/2012    JAYLON (obstructive sleep apnea)                              REVIEW OF SYSTEMS:   Sleep related symptoms as per HPI, dyspnea, stable wgt, acid reflux. Otherwise a balance review of 10-systems is negative.       PHYSICAL EXAM:  /80  Pulse 64  Ht 5' 9" (1.753 m)  Wt 77.5 kg (170 lb 13.7 oz)  BMI 25.23 kg/m2  GENERAL: Overweight body habitus, well groomed.      ASSESSMENT:    1. JAYLON, mild, moderate by RDI criteria. 6/17/17: Excellent adherence, symptoms are improved. 76% compliance. Mild leaks most likely mild residual ahi. Symptoms are improved, she is feeling better   She has medical co-morbidities of hypertension, GERD, which can be worsened by JAYLON.       PLAN:   Discussed etiology of JAYLON and potential ramifications of untreated JAYLON, including heart disease, hypertension, cognitive difficulties, stroke, and diabetes.      APAP continue, adjust 7-12cm. [facility] prn supplies.  RTC 2-mos adherence monitoring.     Do not drive sleepy    Continue good sleep hygiene  "

## 2017-06-09 ENCOUNTER — TELEPHONE (OUTPATIENT)
Dept: OBSTETRICS AND GYNECOLOGY | Facility: CLINIC | Age: 57
End: 2017-06-09

## 2017-06-09 DIAGNOSIS — Z12.31 VISIT FOR SCREENING MAMMOGRAM: Primary | ICD-10-CM

## 2017-07-10 ENCOUNTER — OFFICE VISIT (OUTPATIENT)
Dept: SLEEP MEDICINE | Facility: CLINIC | Age: 57
End: 2017-07-10
Payer: MEDICAID

## 2017-07-10 VITALS
HEIGHT: 69 IN | SYSTOLIC BLOOD PRESSURE: 140 MMHG | DIASTOLIC BLOOD PRESSURE: 90 MMHG | BODY MASS INDEX: 25.76 KG/M2 | HEART RATE: 64 BPM | WEIGHT: 173.94 LBS

## 2017-07-10 DIAGNOSIS — G47.33 OBSTRUCTIVE SLEEP APNEA: Primary | ICD-10-CM

## 2017-07-10 PROCEDURE — 99999 PR PBB SHADOW E&M-EST. PATIENT-LVL III: CPT | Mod: PBBFAC,,, | Performed by: NURSE PRACTITIONER

## 2017-07-10 PROCEDURE — 99213 OFFICE O/P EST LOW 20 MIN: CPT | Mod: PBBFAC | Performed by: NURSE PRACTITIONER

## 2017-07-10 PROCEDURE — 99213 OFFICE O/P EST LOW 20 MIN: CPT | Mod: S$PBB,,, | Performed by: NURSE PRACTITIONER

## 2017-07-10 NOTE — PROGRESS NOTES
Leschia Bastian  was seen as f/u today for the mgt of JAYLON. Last seen 5/17/17    Continues to use her apap. Took a bit to get adjusted to start pressure 7cm but got used to it. Has mild leaks from mask, time for new one next week. Denies pressure intolerance. Less nocturia. ESS=11. Occasional mouth opening which can awaken her. Ran out of water once and woke her up/smelled. Works late and up early at times affecting sleep. Still has tv on but turns back to it. When timer goes off she may awaken. Continues to feel less tired.     Interrogation- dreamwear, ahi 4-5.2, 90 %tile 9.1-11, avg 30d 6.1h/n. 98% mask fit            HISTORY:  12/09/2016 INITIAL HISTORY OF PRESENT ILLNESS:  Leschia T Bastian is a 56 y.o. female is here to be evaluated for a sleep disorder.       CHIEF COMPLAINT:      The patient's complaints include excessive daytime sleepiness, excessive daytime fatigue, snoring,  gasping for air in sleep and interrupted sleep since  6 years.    SOB  1 ER visit for chest pain.    Reports  dry mouth and sore throat  Reports occasional nasal congestion   Reports  morning headaches  Reports  interrupted sleep  Denies frequent leg movements  Denies symptoms concerning for parasomnia    The ESS ([place] Sleepiness Score) taken on initial visit is 14 /24      SLEEP ROUTINE AND LIFESTYLE 07/10/2017 :    Occupation:unemployed.    Bed partner:  since 5 year ago - was taking Xanax     Time to bed - wake up time on a workday : 9-10 PM to 6 AM  Time to bed - wake up time on a day off: 9-10 PM to 6 AM  Sleep onset latency: 30 min - 1 hour  Disruptions or awakenings: 3-4  Time to fall back into sleep: 1-2 hour   Perceived sleep quality: 2/5  Perceived total sleep time:  5  hours.  Daytime naps: 2    Exercise routine: sometimes  Caffeine:  Stopped    3/29/17:  She has since undergone a home sleep study which was reviewed with her today. She continues to report difficulty staying asleep, wakes up after 2-3 hr then  "up for the day. She walks in am. Has lost 6# in interim. She is always tired. Wears mouth guard but still opening mouth. Watches tv in bed ~ 9-10p. ESS=18. Sleeps mainly on her back. Nocturia. Sleeps alone. Recurrent episode CP recently.     5/17/17:   Since last seen she has been setup with APAP 6-16cm. Falling asleep earlier now around 9-10p, ready for bed. Having mild air leaks. Using it nightly except just 3-4 nights since setup. No more snoring. Less disrupted sleep now, less nocturia. Likes dreamwear mask. No chin strap. Denies pressure intolerance. Denies nasal drying. Denies oral drying. ESS=10    Interrogation- new machine condition, 30davg 5.1h/n. 90 %tile 8.4-9, 0% periodic, heat at 3-4. 23/30d>4h. AHI 8.6-11      HST 3/14/17 AHI 13(RDI 28)/low sat 81.4%        REVIEW OF SYSTEMS:   Sleep related symptoms as per HPI, dyspnea,3# gain, acid reflux. Otherwise a balance review of 10-systems is negative.       PHYSICAL EXAM:  BP (!) 140/90   Pulse 64   Ht 5' 9" (1.753 m)   Wt 78.9 kg (173 lb 15.1 oz)   BMI 25.69 kg/m²   GENERAL: Overweight body habitus, well groomed.      ASSESSMENT:    1. JAYLON, mild, moderate by RDI criteria. 6/17/17: Excellent adherence, symptoms are improved. 76% compliance. Mild leaks most likely mild residual ahi. Symptoms are improved, she is feeling better. 7/10/17: Remains adherent, ahi improved and overall 1h more 30d sleep time. Does not feel she is getting enough total sleep time   She has medical co-morbidities of hypertension, GERD, which can be worsened by JAYLON.       PLAN:   Discussed etiology of JAYLON and potential ramifications of untreated JAYLON, including heart disease, hypertension, cognitive difficulties, stroke, and diabetes.      APAP continue adjust today 8-14cm. [facility] D[facility] prn supplies.  RTC 12-mos adherence monitoring. Resume chin strap or consider wesley view. Avoid airway drying.     Do not drive sleepy. Improve total sleep time minimum 7hr/night.   Encouraged continued " weight loss efforts for potential improvement of JAYLON and overall health benefits, formal exercise routine/aerobic/walking      Continue good sleep hygiene

## 2017-07-27 ENCOUNTER — HOSPITAL ENCOUNTER (OUTPATIENT)
Dept: RADIOLOGY | Facility: HOSPITAL | Age: 57
Discharge: HOME | End: 2017-07-27
Attending: OBSTETRICS & GYNECOLOGY
Payer: MEDICAID

## 2017-07-27 ENCOUNTER — OFFICE VISIT (OUTPATIENT)
Dept: OBSTETRICS AND GYNECOLOGY | Facility: CLINIC | Age: 57
End: 2017-07-27
Payer: MEDICAID

## 2017-07-27 VITALS
DIASTOLIC BLOOD PRESSURE: 96 MMHG | HEIGHT: 69 IN | BODY MASS INDEX: 25.63 KG/M2 | WEIGHT: 173.06 LBS | SYSTOLIC BLOOD PRESSURE: 134 MMHG

## 2017-07-27 VITALS — BODY MASS INDEX: 25.62 KG/M2 | HEIGHT: 69 IN | WEIGHT: 173 LBS

## 2017-07-27 DIAGNOSIS — N89.8 VAGINAL DISCHARGE: Primary | ICD-10-CM

## 2017-07-27 DIAGNOSIS — Z01.419 ENCOUNTER FOR GYNECOLOGICAL EXAMINATION WITHOUT ABNORMAL FINDING: ICD-10-CM

## 2017-07-27 DIAGNOSIS — Z12.31 VISIT FOR SCREENING MAMMOGRAM: ICD-10-CM

## 2017-07-27 DIAGNOSIS — N95.1 PERIMENOPAUSAL: ICD-10-CM

## 2017-07-27 LAB
CANDIDA RRNA VAG QL PROBE: NEGATIVE
G VAGINALIS RRNA GENITAL QL PROBE: POSITIVE
T VAGINALIS RRNA GENITAL QL PROBE: NEGATIVE

## 2017-07-27 PROCEDURE — 77063 BREAST TOMOSYNTHESIS BI: CPT | Mod: 26,,, | Performed by: RADIOLOGY

## 2017-07-27 PROCEDURE — 87591 N.GONORRHOEAE DNA AMP PROB: CPT

## 2017-07-27 PROCEDURE — 87660 TRICHOMONAS VAGIN DIR PROBE: CPT

## 2017-07-27 PROCEDURE — 99213 OFFICE O/P EST LOW 20 MIN: CPT | Mod: PBBFAC | Performed by: OBSTETRICS & GYNECOLOGY

## 2017-07-27 PROCEDURE — 99396 PREV VISIT EST AGE 40-64: CPT | Mod: S$PBB,,, | Performed by: OBSTETRICS & GYNECOLOGY

## 2017-07-27 PROCEDURE — 77067 SCR MAMMO BI INCL CAD: CPT | Mod: 26,,, | Performed by: RADIOLOGY

## 2017-07-27 PROCEDURE — 87480 CANDIDA DNA DIR PROBE: CPT

## 2017-07-27 PROCEDURE — 99999 PR PBB SHADOW E&M-EST. PATIENT-LVL III: CPT | Mod: PBBFAC,,, | Performed by: OBSTETRICS & GYNECOLOGY

## 2017-07-27 NOTE — PROGRESS NOTES
HISTORY OF PRESENT ILLNESS:    Leschia T Bastian is a 56 y.o. female, , Patient's last menstrual period was 2017.,  presents for a routine exam.   Vaginal odor after last cycle. Cycles every 1-3 months     Past Medical History:   Diagnosis Date    Cervical radiculopathy     GERD (gastroesophageal reflux disease)     Hypertension     Lumbar radiculopathy     Seasonal allergies        Past Surgical History:   Procedure Laterality Date    BUNIONECTOMY      GALLBLADDER SURGERY      TUBAL LIGATION      UMBILICAL HIDRADENITIS EXCISION         MEDICATIONS AND ALLERGIES:      Current Outpatient Prescriptions:     amlodipine (NORVASC) 5 MG tablet, Take 1 tablet (5 mg total) by mouth once daily. Stop the losartan, Disp: 30 tablet, Rfl: 12    aspirin-acetaminophen-caffeine 250-250-65 mg (EXCEDRIN MIGRAINE) 250-250-65 mg per tablet, Take 1 tablet by mouth every 6 (six) hours as needed for Pain., Disp: , Rfl:     epinephrine (EPIPEN 2-ERICH) 0.3 mg/0.3 mL AtIn, Inject 0.3 mLs (0.3 mg total) into the muscle once., Disp: 2 Device, Rfl: 1    FEXOFENADINE HCL (ALLER-EASE ORAL), Take by mouth., Disp: , Rfl:     hydrochlorothiazide (HYDRODIURIL) 12.5 MG Tab, Take 1 tablet (12.5 mg total) by mouth once daily., Disp: 30 tablet, Rfl: 11    ranitidine (ZANTAC) 150 MG tablet, Take 1 tablet (150 mg total) by mouth 2 (two) times daily. Patient will need a prescription for this for allergic reaction, Disp: 60 tablet, Rfl: 11    Review of patient's allergies indicates:   Allergen Reactions    Clams      Allergy testing confirmed    Codeine Itching    Shrimp      Peeling shrimp, got a rash, allergy test confirmed    Crab Rash     Throat closed       Family History   Problem Relation Age of Onset    Breast cancer Mother 53    Cancer Mother      breast cancer    Hypertension Mother     Ovarian cancer Cousin     Cancer Cousin      ovarian    Hypertension Sister     Breast cancer Sister 52    Glaucoma Father   "   Breast cancer Maternal Cousin     Breast cancer Maternal Cousin     Colon cancer Neg Hx        Social History     Social History    Marital status: Legally      Spouse name: N/A    Number of children: N/A    Years of education: N/A     Occupational History    Not on file.     Social History Main Topics    Smoking status: Never Smoker    Smokeless tobacco: Never Used    Alcohol use Yes      Comment: socially    Drug use: No    Sexual activity: Not Currently     Partners: Male     Other Topics Concern    Not on file     Social History Narrative    No narrative on file       COMPREHENSIVE GYN HISTORY:  PAP History: Denies abnormal Paps.  Infection History: Denies STDs. Denies PID.  Benign History: Denies uterine fibroids. Denies ovarian cysts. Denies endometriosis. Denies other conditions.  Cancer History: Denies cervical cancer. Denies uterine cancer or hyperplasia. Denies ovarian cancer. Denies vulvar cancer or pre-cancer. Denies vaginal cancer or pre-cancer. Denies breast cancer. Denies colon cancer.  Sexual Activity History: Reports currently being sexually active  Menstrual History: Monthly. Mod then light flow.   Dysmenorrhea History: Reports mild dysmenorrhea.       ROS:  GENERAL: No weight changes. No swelling. No fatigue. No fever.  CARDIOVASCULAR: No chest pain. No shortness of breath. No leg cramps.   NEUROLOGICAL: No headaches. No vision changes.  BREASTS: No pain. No lumps. No discharge.  ABDOMEN: No pain. No nausea. No vomiting. No diarrhea. No constipation.  REPRODUCTIVE: No abnormal bleeding.   VULVA: No pain. No lesions. No itching.  VAGINA: No relaxation. No itching. No odor. No discharge. No lesions.  URINARY: No incontinence. No nocturia. No frequency. No dysuria.    BP (!) 134/96   Ht 5' 9" (1.753 m)   Wt 78.5 kg (173 lb 1 oz)   LMP 06/28/2017   BMI 25.56 kg/m²     PE:  APPEARANCE: Well nourished, well developed, in no acute distress.  AFFECT: WNL, alert and oriented x " 3.  SKIN: No acne or hirsutism.  NECK: Neck symmetric, without masses or thyromegaly.  NODES: No inguinal, cervical, axillary or femoral lymph node enlargement.  CHEST: Good respiratory effort.   ABDOMEN: Soft. No tenderness or masses. No hepatosplenomegaly. No hernias.  BREASTS: Symmetrical, no skin changes, visible lesions, palpable masses or nipple discharge bilaterally.  PELVIC: External female genitalia without lesions.  Female hair distribution. Adequate perineal body, Normal urethral meatus. Vagina moist and well rugated without lesions or discharge.  No significant cystocele or rectocele present. Cervix pink without lesions, discharge or tenderness. Uterus is 6 week size, regular, mobile and nontender. Adnexa without masses or tenderness.  EXTREMITIES: No edema    DIAGNOSIS:  1. Vaginal discharge    2. Encounter for gynecological examination without abnormal finding    3. Perimenopausal      PLAN:    Orders Placed This Encounter    Vaginosis Screen by DNA Probe    C. trachomatis/N. gonorrhoeae by AMP DNA Cervix     COUNSELING:  The patient was counseled today on:  -A.C.S. Pap and pelvic exam guidelines (pap every 3 years), recomendations for yearly mammogram;  -to follow up with her PCP for other health maintenance.    FOLLOW-UP with me annually.

## 2017-07-28 ENCOUNTER — TELEPHONE (OUTPATIENT)
Dept: OBSTETRICS AND GYNECOLOGY | Facility: CLINIC | Age: 57
End: 2017-07-28

## 2017-07-28 DIAGNOSIS — B96.89 BV (BACTERIAL VAGINOSIS): Primary | ICD-10-CM

## 2017-07-28 DIAGNOSIS — N76.0 BV (BACTERIAL VAGINOSIS): Primary | ICD-10-CM

## 2017-07-28 LAB
C TRACH DNA SPEC QL NAA+PROBE: NOT DETECTED
N GONORRHOEA DNA SPEC QL NAA+PROBE: NOT DETECTED

## 2017-07-28 RX ORDER — METRONIDAZOLE 7.5 MG/G
1 GEL VAGINAL DAILY
Qty: 1 G | Refills: 0 | Status: SHIPPED | OUTPATIENT
Start: 2017-07-28 | End: 2017-08-04

## 2017-07-28 NOTE — TELEPHONE ENCOUNTER
I spoke to the pt and informed her that her cultures came back and she has medicine at  the pharmacy.

## 2017-08-01 ENCOUNTER — OFFICE VISIT (OUTPATIENT)
Dept: INTERNAL MEDICINE | Facility: CLINIC | Age: 57
End: 2017-08-01
Payer: MEDICAID

## 2017-08-01 ENCOUNTER — DOCUMENTATION ONLY (OUTPATIENT)
Dept: INTERNAL MEDICINE | Facility: CLINIC | Age: 57
End: 2017-08-01

## 2017-08-01 ENCOUNTER — LAB VISIT (OUTPATIENT)
Dept: LAB | Facility: HOSPITAL | Age: 57
End: 2017-08-01
Attending: INTERNAL MEDICINE
Payer: MEDICAID

## 2017-08-01 VITALS
DIASTOLIC BLOOD PRESSURE: 80 MMHG | BODY MASS INDEX: 25.96 KG/M2 | WEIGHT: 175.25 LBS | HEART RATE: 63 BPM | SYSTOLIC BLOOD PRESSURE: 132 MMHG | HEIGHT: 69 IN | OXYGEN SATURATION: 98 %

## 2017-08-01 DIAGNOSIS — E55.9 MILD VITAMIN D DEFICIENCY: ICD-10-CM

## 2017-08-01 DIAGNOSIS — Z91.89 INCREASED RISK OF BREAST CANCER: ICD-10-CM

## 2017-08-01 DIAGNOSIS — G47.33 OSA (OBSTRUCTIVE SLEEP APNEA): ICD-10-CM

## 2017-08-01 DIAGNOSIS — K21.9 GASTROESOPHAGEAL REFLUX DISEASE WITHOUT ESOPHAGITIS: ICD-10-CM

## 2017-08-01 DIAGNOSIS — I10 ESSENTIAL HYPERTENSION: ICD-10-CM

## 2017-08-01 DIAGNOSIS — Z12.11 COLON CANCER SCREENING: ICD-10-CM

## 2017-08-01 DIAGNOSIS — I10 ESSENTIAL HYPERTENSION: Primary | ICD-10-CM

## 2017-08-01 LAB
25(OH)D3+25(OH)D2 SERPL-MCNC: 21 NG/ML
ALBUMIN SERPL BCP-MCNC: 3.8 G/DL
ALP SERPL-CCNC: 78 U/L
ALT SERPL W/O P-5'-P-CCNC: 92 U/L
ANION GAP SERPL CALC-SCNC: 9 MMOL/L
AST SERPL-CCNC: 36 U/L
BASOPHILS # BLD AUTO: 0.01 K/UL
BASOPHILS NFR BLD: 0.2 %
BILIRUB SERPL-MCNC: 0.7 MG/DL
BUN SERPL-MCNC: 9 MG/DL
CALCIUM SERPL-MCNC: 9.6 MG/DL
CHLORIDE SERPL-SCNC: 106 MMOL/L
CHOLEST/HDLC SERPL: 3.3 {RATIO}
CO2 SERPL-SCNC: 26 MMOL/L
CREAT SERPL-MCNC: 0.7 MG/DL
DIFFERENTIAL METHOD: ABNORMAL
EOSINOPHIL # BLD AUTO: 0.1 K/UL
EOSINOPHIL NFR BLD: 3 %
ERYTHROCYTE [DISTWIDTH] IN BLOOD BY AUTOMATED COUNT: 14.5 %
EST. GFR  (AFRICAN AMERICAN): >60 ML/MIN/1.73 M^2
EST. GFR  (NON AFRICAN AMERICAN): >60 ML/MIN/1.73 M^2
GLUCOSE SERPL-MCNC: 86 MG/DL
HCT VFR BLD AUTO: 40.7 %
HDL/CHOLESTEROL RATIO: 30.6 %
HDLC SERPL-MCNC: 206 MG/DL
HDLC SERPL-MCNC: 63 MG/DL
HGB BLD-MCNC: 12.5 G/DL
LDLC SERPL CALC-MCNC: 120.4 MG/DL
LYMPHOCYTES # BLD AUTO: 2.1 K/UL
LYMPHOCYTES NFR BLD: 45.4 %
MCH RBC QN AUTO: 23.1 PG
MCHC RBC AUTO-ENTMCNC: 30.7 G/DL
MCV RBC AUTO: 75 FL
MONOCYTES # BLD AUTO: 0.4 K/UL
MONOCYTES NFR BLD: 7.6 %
NEUTROPHILS # BLD AUTO: 2 K/UL
NEUTROPHILS NFR BLD: 43.8 %
NONHDLC SERPL-MCNC: 143 MG/DL
PLATELET # BLD AUTO: 224 K/UL
PMV BLD AUTO: 10.7 FL
POTASSIUM SERPL-SCNC: 4.1 MMOL/L
PROT SERPL-MCNC: 7.2 G/DL
RBC # BLD AUTO: 5.41 M/UL
SODIUM SERPL-SCNC: 141 MMOL/L
TRIGL SERPL-MCNC: 113 MG/DL
WBC # BLD AUTO: 4.6 K/UL

## 2017-08-01 PROCEDURE — 36415 COLL VENOUS BLD VENIPUNCTURE: CPT

## 2017-08-01 PROCEDURE — 80061 LIPID PANEL: CPT

## 2017-08-01 PROCEDURE — 99214 OFFICE O/P EST MOD 30 MIN: CPT | Mod: S$PBB,,, | Performed by: INTERNAL MEDICINE

## 2017-08-01 PROCEDURE — 82306 VITAMIN D 25 HYDROXY: CPT

## 2017-08-01 PROCEDURE — 80053 COMPREHEN METABOLIC PANEL: CPT

## 2017-08-01 PROCEDURE — 85025 COMPLETE CBC W/AUTO DIFF WBC: CPT

## 2017-08-01 PROCEDURE — 99999 PR PBB SHADOW E&M-EST. PATIENT-LVL IV: CPT | Mod: PBBFAC,,, | Performed by: INTERNAL MEDICINE

## 2017-08-01 NOTE — PROGRESS NOTES
Subjective:      Patient ID: Leschia T Bastian is a 56 y.o. female.    Chief Complaint: Follow-up    HPI:  HPI   Patient is here for her review of medical problems    Hypertension: well controlled on amlodipine and hctz with labs needed every 6 weeks.    GERD:on ranitidine     Annual exam: 8/1/2017  Colonoscopy 11/29/2010: history of colon polyps: 7/21/2015 follow up 5 years  Mammogram:  7/27/2017 with increased risk of breast cancer  Gyn: 2017  Optho:2017  Flu: due in the fall  Tetanus: gave prescription 2017  Shingles: not due  Pneumovax: not due  Prevnar: not due    Cholecystectomy: completed     Labs  Vit D 16  Patient Active Problem List   Diagnosis    Hypertension    GERD (gastroesophageal reflux disease)    Anxiety    JAYLON (obstructive sleep apnea)    Mild vitamin D deficiency     Past Medical History:   Diagnosis Date    Cervical radiculopathy     GERD (gastroesophageal reflux disease)     Hypertension     Lumbar radiculopathy     Seasonal allergies      Past Surgical History:   Procedure Laterality Date    BUNIONECTOMY      GALLBLADDER SURGERY      TUBAL LIGATION      UMBILICAL HIDRADENITIS EXCISION       Family History   Problem Relation Age of Onset    Breast cancer Mother 53    Cancer Mother      breast cancer    Hypertension Mother     Ovarian cancer Cousin     Cancer Cousin      ovarian    Hypertension Sister     Breast cancer Sister 52    Glaucoma Father     Breast cancer Maternal Cousin     Breast cancer Maternal Cousin     Colon cancer Neg Hx      Review of Systems   Constitutional: Negative for chills, fever and unexpected weight change.   HENT: Negative for trouble swallowing.    Respiratory: Negative for cough, shortness of breath and wheezing.    Cardiovascular: Negative for chest pain and palpitations.   Gastrointestinal: Negative for abdominal distention, abdominal pain, blood in stool and vomiting.   Musculoskeletal: Negative for back pain.   Psychiatric/Behavioral:     "    Anxiety     Objective:     Vitals:    08/01/17 0931   BP: 132/80   Pulse: 63   SpO2: 98%   Weight: 79.5 kg (175 lb 4.3 oz)   Height: 5' 9" (1.753 m)   PainSc: 0-No pain     Body mass index is 25.88 kg/m².  Physical Exam   Constitutional: She is oriented to person, place, and time. She appears well-developed and well-nourished. No distress.   Neck: Carotid bruit is not present. No thyromegaly present.   Cardiovascular: Normal rate, regular rhythm and normal heart sounds.  PMI is not displaced.    Pulmonary/Chest: Effort normal and breath sounds normal. No respiratory distress.   Abdominal: Soft. Bowel sounds are normal. She exhibits no distension. There is no tenderness.   Musculoskeletal: She exhibits no edema.   Neurological: She is alert and oriented to person, place, and time.     Assessment:     1. Essential hypertension    2. Gastroesophageal reflux disease without esophagitis    3. JAYLON (obstructive sleep apnea)    4. Colon cancer screening    5. Increased risk of breast cancer    6. Mild vitamin D deficiency      Plan:   Leschia was seen today for follow-up.    Diagnoses and all orders for this visit:    Essential hypertension  -     CBC auto differential; Future  -     Comprehensive metabolic panel; Future  -     Lipid panel; Future  The current medical regimen is effective;  continue present plan and medications.    Gastroesophageal reflux disease without esophagitis  The current medical regimen is effective;  continue present plan and medications.      JAYLON (obstructive sleep apnea)  Uses eqipment    Colon cancer screening  -     Fecal Immunochemical Test (iFOBT); Future    Increased risk of breast cancer  -     Ambulatory Referral to Breast Surgery    Mild vitamin D deficiency  -     Vitamin D; Future    Return in about 6 months (around 2/1/2018), or hypertension.         Medication List          Accurate as of 8/1/17 10:26 AM. If you have any questions, ask your nurse or doctor.               CONTINUE " taking these medications    ALLER-EASE ORAL     amlodipine 5 MG tablet  Commonly known as:  NORVASC  Take 1 tablet (5 mg total) by mouth once daily. Stop the losartan     aspirin-acetaminophen-caffeine 250-250-65 mg 250-250-65 mg per tablet  Commonly known as:  EXCEDRIN MIGRAINE     epinephrine 0.3 mg/0.3 mL Atin  Commonly known as:  EPIPEN 2-ERICH  Inject 0.3 mLs (0.3 mg total) into the muscle once.     hydrochlorothiazide 12.5 MG Tab  Commonly known as:  HYDRODIURIL  Take 1 tablet (12.5 mg total) by mouth once daily.     metronidazole 0.75 % vaginal gel  Commonly known as:  METROGEL VAGINAL  Place 1 applicator vaginally once daily. Use at bedtime     ranitidine 150 MG tablet  Commonly known as:  ZANTAC  Take 1 tablet (150 mg total) by mouth 2 (two) times daily. Patient will need a prescription for this for allergic reaction

## 2017-08-04 ENCOUNTER — TELEPHONE (OUTPATIENT)
Dept: INTERNAL MEDICINE | Facility: CLINIC | Age: 57
End: 2017-08-04

## 2017-08-04 DIAGNOSIS — R74.8 ABNORMAL LIVER ENZYMES: Primary | ICD-10-CM

## 2017-08-14 ENCOUNTER — TELEPHONE (OUTPATIENT)
Dept: INTERNAL MEDICINE | Facility: CLINIC | Age: 57
End: 2017-08-14

## 2017-08-15 ENCOUNTER — TELEPHONE (OUTPATIENT)
Dept: INTERNAL MEDICINE | Facility: CLINIC | Age: 57
End: 2017-08-15

## 2017-08-15 NOTE — TELEPHONE ENCOUNTER
----- Message from Lucero Singh sent at 8/15/2017 10:29 AM CDT -----  Contact: self/[phone]  Pt called in regards to a missed call from the office.        Please advise

## 2017-08-15 NOTE — TELEPHONE ENCOUNTER
----- Message from Sara Butcher sent at 8/14/2017  2:44 PM CDT -----  Contact: self/[phone]      Patient is returning a phone call.  Who left a message for the patient: Dr Gomez's Office  Does patient know what this is regarding:  No  Comments: Please call and advise.       Thank you!!!

## 2017-08-16 ENCOUNTER — LAB VISIT (OUTPATIENT)
Dept: LAB | Facility: HOSPITAL | Age: 57
End: 2017-08-16
Attending: INTERNAL MEDICINE
Payer: MEDICAID

## 2017-08-16 DIAGNOSIS — Z12.11 COLON CANCER SCREENING: ICD-10-CM

## 2017-08-16 LAB — HEMOCCULT STL QL IA: NEGATIVE

## 2017-08-16 PROCEDURE — 82274 ASSAY TEST FOR BLOOD FECAL: CPT

## 2017-08-17 ENCOUNTER — OFFICE VISIT (OUTPATIENT)
Dept: SURGERY | Facility: CLINIC | Age: 57
End: 2017-08-17
Payer: MEDICAID

## 2017-08-17 VITALS
BODY MASS INDEX: 26.36 KG/M2 | WEIGHT: 178 LBS | SYSTOLIC BLOOD PRESSURE: 117 MMHG | DIASTOLIC BLOOD PRESSURE: 75 MMHG | HEIGHT: 69 IN | TEMPERATURE: 98 F | HEART RATE: 77 BPM

## 2017-08-17 DIAGNOSIS — Z91.89 AT HIGH RISK FOR BREAST CANCER: Primary | ICD-10-CM

## 2017-08-17 PROCEDURE — 3074F SYST BP LT 130 MM HG: CPT | Mod: ,,, | Performed by: SURGERY

## 2017-08-17 PROCEDURE — 3078F DIAST BP <80 MM HG: CPT | Mod: ,,, | Performed by: SURGERY

## 2017-08-17 PROCEDURE — 99214 OFFICE O/P EST MOD 30 MIN: CPT | Mod: S$PBB,,, | Performed by: SURGERY

## 2017-08-17 PROCEDURE — 3008F BODY MASS INDEX DOCD: CPT | Mod: ,,, | Performed by: SURGERY

## 2017-08-17 PROCEDURE — 99999 PR PBB SHADOW E&M-EST. PATIENT-LVL III: CPT | Mod: PBBFAC,,, | Performed by: SURGERY

## 2017-08-17 PROCEDURE — 99213 OFFICE O/P EST LOW 20 MIN: CPT | Mod: PBBFAC,PO | Performed by: SURGERY

## 2017-08-17 NOTE — LETTER
August 20, 2017      Karina Gomez MD  [address]  [facility] [address]           [facility]  [address]  [facility] [address]  Phone: [phone]  Fax: [phone]          Patient: Leschia T Bastian   MR Number: [id number]   YOB: 1960   Date of Visit: 8/17/2017       Dear Dr. Karina Gomez:    Thank you for referring Leschia Bastian to me for evaluation. Attached you will find relevant portions of my assessment and plan of care.    If you have questions, please do not hesitate to call me. I look forward to following Leschia Bastian along with you.    Sincerely,        Enclosure  CC:  No Recipients    If you would like to receive this communication electronically, please contact [email or web address] or [phone] to request more information on Mobile Action Link access.    For providers and/or their staff who would like to refer a patient to [facility], please contact us through our one-stop-shop provider referral line, [facility] , at [phone].    If you feel you have received this communication in error or would no longer like to receive these types of communications, please e-mail [email or web address]

## 2017-08-23 NOTE — PROGRESS NOTES
BREAST HIGH RISK CLINIC  [facility]  Breast Surgery      Date of Visit:  2017    Referring provider:  Karina Gomez MD  [address] [place] [address]    PCP:  Karina Gomez MD    HIGH RISK    Leschia T Bastian is a 57 y.o. postmenopausal female referred for evaluation of increased risk of breast cancer based on TC score 23%.  Here today to discussed options of high risk screening and risk reduction.    Other breast cancer risk factors include family hx on mother's side, mom with breast CA and sister with breast CA.     Age of menarche was 13.  Last menstrual period was irregular.  Patient denies hormonal therapy.     Patient is .  Age of first live birth was 22.  Patient did not breast feed.       Family History is significant for the following:  Mother:  Breast cancer, age 53. Genetic testing negative  Sister(s):  Breast cancer, age 50. Genetic testing negative.  Maternal aunts:  Breast cancer  Maternal cousin: ovarian cancer  Maternal 2 cousins: breast cancer    Social History:   Smoking:  negative  Drinking:  negative    The following portions of the patient's history were reviewed and updated as appropriate: She  has a past medical history of Cervical radiculopathy; GERD (gastroesophageal reflux disease); Hypertension; Lumbar radiculopathy; and Seasonal allergies.  She  does not have any pertinent problems on file.  She  has a past surgical history that includes Tubal ligation; Gallbladder surgery; Bunionectomy; and Axillary hidradenitis excision (Bilateral).  Her family history includes Breast cancer in her maternal cousin and maternal cousin; Breast cancer (age of onset: 52) in her sister; Breast cancer (age of onset: 53) in her mother; Cancer in her cousin and mother; Glaucoma in her father; Hypertension in her mother and sister; Ovarian cancer in her cousin.  She  reports that she has never smoked. She has never used smokeless tobacco. She reports that she drinks alcohol.  "She reports that she does not use drugs.  She has a current medication list which includes the following prescription(s): amlodipine, aspirin-acetaminophen-caffeine 250-250-65 mg, fexofenadine hcl, hydrochlorothiazide, ranitidine, and epinephrine.  She is allergic to clams; codeine; shrimp; and crab..    Review of Systems  Pertinent items are noted in HPI.      Objective:   /75 (BP Location: Left arm, Patient Position: Sitting, BP Method: Large (Automatic))   Pulse 77   Temp 97.8 °F (36.6 °C) (Oral)   Ht 5' 9" (1.753 m)   Wt 80.7 kg (178 lb)   LMP 06/28/2017   BMI 26.29 kg/m²     General: NAD  Chest: nonlabored breathing, no obvious deformity  Heart: regular rate  Abdomen: soft, nondistended  Extremities: no edema noted  Breasts: breasts appear normal, no suspicious masses, no skin or nipple changes or axillary nodes.    Imaging  MMG 7/2017 negative BIRADS 1     Assessment:     This is a 57 y.o. female with an increased risk of breast cancer based on family history and TC score of 23%.        Plan:   Discussed risk models can vary based on the model used.  There are several models available and we currently use TC model for our patients with family history.  Based on this, the patient's risk exceeds 20%.  Patients with lifetime risk over 20% qualify for increased screening with MRI, in addition to mammograms.  We also would perform breast exams every 6 months.  Discussed pros and cons of MRI screening.    Discussed genetics referral, however given her mother and sister have both testing negative, there is no role for testing her at this time.    We also discussed risk reduction options. Discussed that we recommend a healthy lifestyle.      Nutrition we recommend fresh fruits and vegetables and lean meats and avoidance of processed foods.    Exercise I recommend at least 30 minutes of cardiovascular exercise 4-5 times per week, even walking would have benefit.  Discussed that exercise can lower the " relative risk of breast cancer by about 18-20%.  Also discussed that obesity is linked to higher risk of breast cancer, therefore exercise in important.    Discussed alcohol use and some studies suggest that increase alcohol intake may increase breast cancer risk.  Therefore, I do recommend limited alcohol intake.    Also discussed risk factors that are not modifiable, such as age at menarche, age at menopause, age at first pregnancy, and family history.     We did discuss hormone replacement therapy as well.  In patients at increased risk, I usually do not recommend HRT for long periods of time.      Discussed briefly risk reduction options with chemoprevention, such as Tamoxifen or Raloxifene.  These have been shown to lower the risk of breast cancer incidence, however have not been shown to improve survival in patients who do not have a breast cancer.    I will see the patient back in 6 months with a MRI.

## 2018-01-08 ENCOUNTER — TELEPHONE (OUTPATIENT)
Dept: OBSTETRICS AND GYNECOLOGY | Facility: CLINIC | Age: 58
End: 2018-01-08

## 2018-01-08 NOTE — TELEPHONE ENCOUNTER
----- Message from Sandhya Wong sent at 1/8/2018  1:07 PM CST -----  Contact: BASTIAN,LESCHIA T [[id number]]  _x  1st Request  _  2nd Request  _  3rd Request        Who: BASTIAN,LESCHIA T [[id number]]    Why: Patient states she has been having pain in her ovaries since last week and would like an appt to be seen as soon as possible. Patient was offered appt with another provider but states she would rather see her doctor. There were no available appts to schedule.  Please advise.     What Number to Call Back: [phone]    When to Expect a call back: (Before the end of the day)   -- if call after 3:00 call back will be tomorrow.

## 2018-01-13 ENCOUNTER — OFFICE VISIT (OUTPATIENT)
Dept: OBSTETRICS AND GYNECOLOGY | Facility: CLINIC | Age: 58
End: 2018-01-13
Payer: MEDICAID

## 2018-01-13 ENCOUNTER — HOSPITAL ENCOUNTER (OUTPATIENT)
Dept: RADIOLOGY | Facility: OTHER | Age: 58
Discharge: HOME | End: 2018-01-13
Attending: ADVANCED PRACTICE MIDWIFE
Payer: MEDICAID

## 2018-01-13 ENCOUNTER — PATIENT MESSAGE (OUTPATIENT)
Dept: OBSTETRICS AND GYNECOLOGY | Facility: CLINIC | Age: 58
End: 2018-01-13

## 2018-01-13 VITALS
BODY MASS INDEX: 27.11 KG/M2 | DIASTOLIC BLOOD PRESSURE: 88 MMHG | HEIGHT: 69 IN | SYSTOLIC BLOOD PRESSURE: 132 MMHG | WEIGHT: 183 LBS

## 2018-01-13 DIAGNOSIS — R10.2 PELVIC PAIN: ICD-10-CM

## 2018-01-13 DIAGNOSIS — R10.2 PELVIC PAIN: Primary | ICD-10-CM

## 2018-01-13 LAB
CANDIDA RRNA VAG QL PROBE: NEGATIVE
G VAGINALIS RRNA GENITAL QL PROBE: NEGATIVE
T VAGINALIS RRNA GENITAL QL PROBE: NEGATIVE

## 2018-01-13 PROCEDURE — 76856 US EXAM PELVIC COMPLETE: CPT | Mod: 26,,, | Performed by: RADIOLOGY

## 2018-01-13 PROCEDURE — 76830 TRANSVAGINAL US NON-OB: CPT | Mod: TC

## 2018-01-13 PROCEDURE — 99999 PR PBB SHADOW E&M-EST. PATIENT-LVL IV: CPT | Mod: PBBFAC,,, | Performed by: ADVANCED PRACTICE MIDWIFE

## 2018-01-13 PROCEDURE — 99214 OFFICE O/P EST MOD 30 MIN: CPT | Mod: S$PBB,,, | Performed by: ADVANCED PRACTICE MIDWIFE

## 2018-01-13 PROCEDURE — 87491 CHLMYD TRACH DNA AMP PROBE: CPT

## 2018-01-13 PROCEDURE — 87480 CANDIDA DNA DIR PROBE: CPT

## 2018-01-13 PROCEDURE — 76830 TRANSVAGINAL US NON-OB: CPT | Mod: 26,,, | Performed by: RADIOLOGY

## 2018-01-13 PROCEDURE — 99214 OFFICE O/P EST MOD 30 MIN: CPT | Mod: PBBFAC | Performed by: ADVANCED PRACTICE MIDWIFE

## 2018-01-13 RX ORDER — FEXOFENADINE HYDROCHLORIDE 180 MG/1
180 TABLET, FILM COATED ORAL DAILY
Refills: 11 | COMMUNITY
Start: 2017-10-21 | End: 2018-02-01 | Stop reason: SDUPTHER

## 2018-01-13 RX ORDER — HYDROCHLOROTHIAZIDE 12.5 MG/1
12.5 CAPSULE ORAL DAILY
Refills: 11 | COMMUNITY
Start: 2017-10-21 | End: 2018-02-01 | Stop reason: SDUPTHER

## 2018-01-13 NOTE — PROGRESS NOTES
Leschia T Bastian is a 57 y.o. female  presents to [facility] with complaint of episode of lower L side pelvic / abd pain 2 weeks ago. Pt reports the pain lasted about 48 hrs and at it worse pt reports a pain scale of 8. Pt did not take any analgesics during the episode. Pt reports that at that time she was also having some constipation. Pt reports that when the pain subsided she continued with abdominal tenderness for 5 days. Pt denies any vaginal symptoms. Denies vaginal bleeding. Pt reports LMP in Oct 2017 and that her cycles have been irregualar since 2016. Pt is current with her PAP and mammogram.    Pt sees Dr. Bradley for her OB/GYN care.    ROS:  GENERAL: No fever, chills, fatigability or weight loss.  VULVAR: No pain, no lesions and no itching.  VAGINAL: No relaxation, no abnormal bleeding and no lesions.  ABDOMEN:  Denies nausea. Denies vomiting. No diarrhea. Reports occ constipation  BREAST: Denies pain. No lumps. No discharge.  URINARY: No incontinence, no nocturia, no frequency and no dysuria.  CARDIOVASCULAR: No chest pain. No shortness of breath. No leg cramps.  NEUROLOGICAL: No headaches. No vision changes.      Review of patient's allergies indicates:   Allergen Reactions    Clams      Allergy testing confirmed    Codeine Itching    Shrimp      Peeling shrimp, got a rash, allergy test confirmed    Crab Rash     Throat closed       Current Outpatient Prescriptions:     ALLER-EASE 180 mg tablet, Take 180 mg by mouth once daily., Disp: , Rfl: 11    aspirin-acetaminophen-caffeine 250-250-65 mg (EXCEDRIN MIGRAINE) 250-250-65 mg per tablet, Take 1 tablet by mouth every 6 (six) hours as needed for Pain., Disp: , Rfl:     FEXOFENADINE HCL (ALLER-EASE ORAL), Take by mouth., Disp: , Rfl:     hydroCHLOROthiazide (MICROZIDE) 12.5 mg capsule, Take 12.5 mg by mouth once daily., Disp: , Rfl: 11    amlodipine (NORVASC) 5 MG tablet, Take 1 tablet (5 mg total) by mouth once daily. Stop the  "losartan, Disp: 30 tablet, Rfl: 12    epinephrine (EPIPEN 2-ERICH) 0.3 mg/0.3 mL AtIn, Inject 0.3 mLs (0.3 mg total) into the muscle once., Disp: 2 Device, Rfl: 1    ranitidine (ZANTAC) 150 MG tablet, Take 1 tablet (150 mg total) by mouth 2 (two) times daily. Patient will need a prescription for this for allergic reaction, Disp: 60 tablet, Rfl: 11    Past Medical History:   Diagnosis Date    Cervical radiculopathy     GERD (gastroesophageal reflux disease)     Hypertension     Lumbar radiculopathy     Seasonal allergies      Past Surgical History:   Procedure Laterality Date    AXILLARY HIDRADENITIS EXCISION Bilateral     BUNIONECTOMY      GALLBLADDER SURGERY      TUBAL LIGATION       Social History   Substance Use Topics    Smoking status: Never Smoker    Smokeless tobacco: Never Used    Alcohol use Yes      Comment: socially     OB History    Para Term  AB Living   5 3 3   2 2   SAB TAB Ectopic Multiple Live Births   2              # Outcome Date GA Lbr Terell/2nd Weight Sex Delivery Anes PTL Lv   5 Term            4 SAB            3 SAB            2 Term            1 Term                   /88   Ht 5' 9" (1.753 m)   Wt 83 kg (182 lb 15.7 oz)   LMP 10/18/2017 (Approximate)   BMI 27.02 kg/m²     PHYSICAL EXAM:  GENERAL: Calm and appropriate affect, alert, oriented x4  SKIN: Color appropriate for race, warm and dry, clean and intact with no rashes.  RESP: Even, unlabored breathing  ABDOMEN: Soft, nontender, no masses.  :   Normal external female genitalia without lesions. Normal hair distribution. Adequate perineal body, normal urethral meatus.  Vagina pink and well rugated, no lesions, vaginal discharge - normal  No significant cystocele or rectocele.  Cervix pink without discharge or lesions, no cervical motion tenderness.  Uterus 4-6 weeks size, regular, mobile and nontender.  Adnexa: normal adnexa in size, nontender and no masses        ASSESSMENT / PLAN:    ICD-10-CM ICD-9-CM  "   1. Pelvic pain R10.2 [id number] US Pelvis Comp with Transvag NON-OB (xpd      Vaginosis Screen by DNA Probe      C. trachomatis/N. gonorrhoeae by AMP DNA Cervicovaginal     Pelvic pain  -     US Pelvis Comp with Transvag NON-OB (xpd; Future; Expected date: 01/13/2018  -     Vaginosis Screen by DNA Probe  -     C. trachomatis/N. gonorrhoeae by AMP DNA Cervicovaginal          Patient was counseled today on possible etiology of pelvic / abd pain including  It being GI in origin. Advised will obtain pelvic US anf follow up PRN results.      FOLLOW UP:   Pending US results.

## 2018-01-16 ENCOUNTER — PATIENT MESSAGE (OUTPATIENT)
Dept: OBSTETRICS AND GYNECOLOGY | Facility: CLINIC | Age: 58
End: 2018-01-16

## 2018-01-16 LAB
C TRACH DNA SPEC QL NAA+PROBE: NOT DETECTED
N GONORRHOEA DNA SPEC QL NAA+PROBE: NOT DETECTED

## 2018-02-01 RX ORDER — HYDROCHLOROTHIAZIDE 12.5 MG/1
CAPSULE ORAL
Qty: 30 CAPSULE | Refills: 1 | Status: SHIPPED | OUTPATIENT
Start: 2018-02-01 | End: 2018-06-01 | Stop reason: SDUPTHER

## 2018-02-01 RX ORDER — AMLODIPINE BESYLATE 5 MG/1
TABLET ORAL
Qty: 30 TABLET | Refills: 1 | Status: SHIPPED | OUTPATIENT
Start: 2018-02-01 | End: 2018-06-01 | Stop reason: SDUPTHER

## 2018-02-02 RX ORDER — FEXOFENADINE HYDROCHLORIDE 180 MG/1
TABLET, FILM COATED ORAL
Qty: 30 TABLET | Refills: 0 | Status: SHIPPED | OUTPATIENT
Start: 2018-02-02 | End: 2018-12-06 | Stop reason: SDUPTHER

## 2018-02-19 ENCOUNTER — HOSPITAL ENCOUNTER (OUTPATIENT)
Dept: RADIOLOGY | Facility: HOSPITAL | Age: 58
Discharge: HOME | End: 2018-02-19
Attending: SURGERY
Payer: MEDICAID

## 2018-02-19 DIAGNOSIS — Z91.89 AT HIGH RISK FOR BREAST CANCER: ICD-10-CM

## 2018-02-19 PROCEDURE — A9577 INJ MULTIHANCE: HCPCS | Performed by: SURGERY

## 2018-02-19 PROCEDURE — 0159T MRI BREAST BILATERAL: CPT | Mod: TC

## 2018-02-19 PROCEDURE — 25500020 PHARM REV CODE 255: Performed by: SURGERY

## 2018-02-19 RX ADMIN — GADOBENATE DIMEGLUMINE 18 ML: 529 INJECTION, SOLUTION INTRAVENOUS at 10:02

## 2018-02-21 ENCOUNTER — TELEPHONE (OUTPATIENT)
Dept: SURGERY | Facility: CLINIC | Age: 58
End: 2018-02-21

## 2018-02-21 DIAGNOSIS — Z91.89 AT HIGH RISK FOR BREAST CANCER: ICD-10-CM

## 2018-02-21 NOTE — TELEPHONE ENCOUNTER
----- Message from Akash Joaquin sent at 2/21/2018  9:31 AM CST -----  Pt said she was unable to complete the MRI. Pt said she got sick in the middle of the test. Pt said she isn't sure is she needs to still make f/u appt. Please call pt regarding this at [phone]

## 2018-02-21 NOTE — TELEPHONE ENCOUNTER
Spoke to patient about her experience with her MRI. Patient states that, when they injected the contrast, she felt a cold sensation travel up her arm. Then it went away, and she got extremely nauseous. She said she couldn't hold the nausea in lying down, and had to sit up and vomit.    We discussed the possibility of a contrast allergy. Told patient it is more likely that they injected the contrast a little more quickly then she could tolerate. Offered to reschedule MRI to try again, or she can meet with Dr. Arredondo and go over other monitoring options. Patient would like to try again.     Rescheduled MRI for Friday. Rescheduled her [facility] appt for next week to go over the results. Patient verbalized understanding of all information.

## 2018-02-23 ENCOUNTER — HOSPITAL ENCOUNTER (OUTPATIENT)
Dept: RADIOLOGY | Facility: HOSPITAL | Age: 58
Discharge: HOME | End: 2018-02-23
Attending: SURGERY
Payer: MEDICAID

## 2018-02-23 DIAGNOSIS — Z91.89 AT HIGH RISK FOR BREAST CANCER: ICD-10-CM

## 2018-02-23 PROCEDURE — 77059 MRI BREAST BILATERAL W W/O CONTRAST: CPT | Mod: TC

## 2018-02-23 PROCEDURE — 0159T MRI BREAST BILATERAL W W/O CONTRAST: CPT | Mod: 26,,, | Performed by: RADIOLOGY

## 2018-02-23 PROCEDURE — 25500020 PHARM REV CODE 255: Performed by: SURGERY

## 2018-02-23 PROCEDURE — 0159T MRI BREAST BILATERAL W W/O CONTRAST: CPT | Mod: TC

## 2018-02-23 PROCEDURE — 77059 MRI BREAST BILATERAL W W/O CONTRAST: CPT | Mod: 26,,, | Performed by: RADIOLOGY

## 2018-02-23 PROCEDURE — A9577 INJ MULTIHANCE: HCPCS | Performed by: SURGERY

## 2018-02-23 RX ADMIN — GADOBENATE DIMEGLUMINE 18 ML: 529 INJECTION, SOLUTION INTRAVENOUS at 09:02

## 2018-03-05 ENCOUNTER — OFFICE VISIT (OUTPATIENT)
Dept: SURGERY | Facility: CLINIC | Age: 58
End: 2018-03-05
Payer: MEDICAID

## 2018-03-05 ENCOUNTER — TELEPHONE (OUTPATIENT)
Dept: OBSTETRICS AND GYNECOLOGY | Facility: CLINIC | Age: 58
End: 2018-03-05

## 2018-03-05 VITALS
HEIGHT: 68 IN | BODY MASS INDEX: 26.98 KG/M2 | WEIGHT: 178 LBS | SYSTOLIC BLOOD PRESSURE: 152 MMHG | TEMPERATURE: 98 F | HEART RATE: 64 BPM | DIASTOLIC BLOOD PRESSURE: 89 MMHG

## 2018-03-05 DIAGNOSIS — Z91.89 AT HIGH RISK FOR BREAST CANCER: Primary | ICD-10-CM

## 2018-03-05 PROCEDURE — 99999 PR PBB SHADOW E&M-EST. PATIENT-LVL III: CPT | Mod: PBBFAC,,, | Performed by: SURGERY

## 2018-03-05 PROCEDURE — 99213 OFFICE O/P EST LOW 20 MIN: CPT | Mod: PBBFAC,PO | Performed by: SURGERY

## 2018-03-05 PROCEDURE — 99213 OFFICE O/P EST LOW 20 MIN: CPT | Mod: S$PBB,,, | Performed by: SURGERY

## 2018-03-05 NOTE — TELEPHONE ENCOUNTER
----- Message from Sarahi Wallace sent at 3/5/2018  9:07 AM CST -----  Contact: self  x_  1st Request  _  2nd Request  _  3rd Request    Who: pt    Why: pt is calling for test results... Pt would also like to discuss irregular bleeding..please advise..    What Number to Call Back: [phone]    When to Expect a call back: (Before the end of the day)   -- if call after 3:00 call back will be tomorrow.

## 2018-03-05 NOTE — TELEPHONE ENCOUNTER
Spoke to patient. Patient states she has not had a cycle since October and has had spotting in December and February lasting 10-14 days. Patient denies heavy bleeding ans abdominal pain. Patient also had questions on blood work to see if she in menopause. Advised patient to discuss with doctor at visit. Scheduled appt in April per Dr. Bradley.

## 2018-03-05 NOTE — TELEPHONE ENCOUNTER
Spoke to patient. Patient she is having periodic spotting every 2-3 weeks with pelvic pain. Patient denies heavy bleeding and severe abdominal/pelvic pain. Patient also states she has a yellow discharge since last visit in walkin clinic with no odor or itching.

## 2018-03-12 NOTE — PROGRESS NOTES
"Date of Service:3/5/2018    DIAGNOSIS:   This is a 57 y.o. female with lifetime risk of breast cancer >20%.       HISTORY OF PRESENT ILLNESS:   Leschia Bastian is a 57 y.o. female comes in for follow up.  She denies change in her breast self-exam specifically denying new masses, skin or nipple changes, or nipple discharge. Past medical and surgical history is updated with new changes. There have been no changes to family history. The patient denies constitutional symptoms of night sweats, weight loss, new headaches, visual changes, new back or bony pain, chest pain, or shortness of breath.      IMAGING:   MRI: 2018 negative  MM2018 negative       PHYSICAL EXAM:   BP (!) 152/89 (BP Location: Right arm, Patient Position: Sitting, BP Method: Medium (Automatic))   Pulse 64   Temp 97.9 °F (36.6 °C) (Oral)   Ht 5' 8" (1.727 m)   Wt 80.7 kg (178 lb)   BMI 27.06 kg/m²   General: The patient appears well and is in no acute distress.   Neuro: Alert & oriented x3.   Cardiovascular: RRR  Breasts: The exam was done with the patient seated and supine. Left breast - within normal limits. No palpable masses and no abnormal skin or nipple findings. No supraclavicular or axillary lymphadenopathy on the left side.   Right breast - within normal limits. No palpable masses and no abnormal skin or nipple findings. No supraclavicular or axillary lymphadenopathy on the right side.  Pulmonary: nonlabored breathing  Abdomen: soft,nondistended.    Extremities: Bilateral full arm range of motion without  lymphedema.     ASSESSMENT:   This is a 57 y.o. female at high risk of breast cancer     PLAN:   1. MRI due in 1 year  2. Continue monthly self breast exams and call the clinic with any changes or problems.  3. Mammogram in 6 months.  4. Return to clinic in 1 year. The patient is in agreement with the plan. Questions were encouraged and answered to patient's satisfaction. Leschia will call our office with any questions or " concerns.

## 2018-04-20 ENCOUNTER — PROCEDURE VISIT (OUTPATIENT)
Dept: OBSTETRICS AND GYNECOLOGY | Facility: CLINIC | Age: 58
End: 2018-04-20
Payer: MEDICAID

## 2018-04-20 VITALS
BODY MASS INDEX: 27.74 KG/M2 | HEIGHT: 68 IN | WEIGHT: 183 LBS | DIASTOLIC BLOOD PRESSURE: 70 MMHG | SYSTOLIC BLOOD PRESSURE: 120 MMHG

## 2018-04-20 DIAGNOSIS — I10 ESSENTIAL HYPERTENSION: Primary | ICD-10-CM

## 2018-04-20 DIAGNOSIS — N89.8 VAGINAL DISCHARGE: ICD-10-CM

## 2018-04-20 DIAGNOSIS — N95.0 PMB (POSTMENOPAUSAL BLEEDING): ICD-10-CM

## 2018-04-20 DIAGNOSIS — K21.9 GASTROESOPHAGEAL REFLUX DISEASE WITHOUT ESOPHAGITIS: ICD-10-CM

## 2018-04-20 PROCEDURE — 87510 GARDNER VAG DNA DIR PROBE: CPT

## 2018-04-20 PROCEDURE — 87491 CHLMYD TRACH DNA AMP PROBE: CPT

## 2018-04-20 PROCEDURE — 87480 CANDIDA DNA DIR PROBE: CPT

## 2018-04-20 RX ORDER — TRIAMCINOLONE ACETONIDE 1 MG/G
CREAM TOPICAL
COMMUNITY
Start: 2018-04-18 | End: 2023-11-03

## 2018-04-20 RX ORDER — EPINEPHRINE 0.3 MG/.3ML
INJECTION SUBCUTANEOUS
COMMUNITY
End: 2020-06-01 | Stop reason: SDUPTHER

## 2018-04-20 RX ORDER — LORATADINE 10 MG/1
TABLET ORAL DAILY PRN
COMMUNITY
Start: 2018-03-13 | End: 2021-10-07 | Stop reason: SDUPTHER

## 2018-04-20 RX ORDER — EPINEPHRINE 0.3 MG/.3ML
INJECTION SUBCUTANEOUS
COMMUNITY
Start: 2018-03-13 | End: 2018-06-14 | Stop reason: CLARIF

## 2018-04-20 RX ORDER — HYDROCORTISONE 25 MG/G
CREAM TOPICAL
COMMUNITY
Start: 2018-04-18 | End: 2023-11-03

## 2018-04-20 RX ORDER — TRIAMCINOLONE ACETONIDE 1 MG/G
PASTE DENTAL
COMMUNITY
End: 2018-06-14 | Stop reason: CLARIF

## 2018-04-20 NOTE — PROGRESS NOTES
HISTORY OF PRESENT ILLNESS:    Leschia T Bastian is a 57 y.o. female  Patient's last menstrual period was 10/20/2017. presents today complaining of yellow discharge for 3 weeks, occasional spotting in      Last visist:  Spoke to patient. Patient states she has not had a cycle since October and has had spotting in December and February lasting 10-14 days. Patient denies heavy bleeding ans abdominal pain. Patient also had questions on blood work to see if she in menopause. Advised patient to discuss with doctor at visit. Scheduled appt in April per Dr. Bradley.    Past Medical History:   Diagnosis Date    Cervical radiculopathy     GERD (gastroesophageal reflux disease)     Hypertension     Lumbar radiculopathy     Seasonal allergies        Past Surgical History:   Procedure Laterality Date    AXILLARY HIDRADENITIS EXCISION Bilateral     BUNIONECTOMY      GALLBLADDER SURGERY      TUBAL LIGATION         MEDICATIONS AND ALLERGIES:      Current Outpatient Prescriptions:     amLODIPine (NORVASC) 5 MG tablet, TAKE 1 TABLET BY MOUTH EVERY DAY (STOP LOSARTAN), Disp: 30 tablet, Rfl: 1    aspirin-acetaminophen-caffeine 250-250-65 mg (EXCEDRIN MIGRAINE) 250-250-65 mg per tablet, Take 1 tablet by mouth every 6 (six) hours as needed for Pain., Disp: , Rfl:     EPINEPHrine (EPIPEN) 0.3 mg/0.3 mL AtIn, one injection, Disp: , Rfl:     EPINEPHrine (EPIPEN) 0.3 mg/0.3 mL AtIn, , Disp: , Rfl:     FEXOFENADINE HCL (ALLER-EASE ORAL), Take by mouth., Disp: , Rfl:     hydroCHLOROthiazide (MICROZIDE) 12.5 mg capsule, TAKE 1 CAPSULE BY MOUTH EVERY DAY, Disp: 30 capsule, Rfl: 1    hydrocortisone 2.5 % cream, , Disp: , Rfl:     loratadine (CLARITIN) 10 mg tablet, , Disp: , Rfl:     ranitidine (ZANTAC) 150 MG capsule, 1 capsule, Disp: , Rfl:     triamcinolone acetonide 0.1% (KENALOG) 0.1 % cream, , Disp: , Rfl:     ALLER-EASE 180 mg tablet, TAKE 1 TABLET BY MOUTH ONCE A DAY, Disp: 30 tablet, Rfl:  0    loratadine 10 mg Cap, 1 tablet, Disp: , Rfl:     ranitidine (ZANTAC) 150 MG tablet, TAKE 1 TABLET BY MOUTH TWICE A DAY, Disp: 60 tablet, Rfl: 1    triamcinolone acetonide 0.1% (KENALOG) 0.1 % paste, 1 application to affected area, Disp: , Rfl:     Review of patient's allergies indicates:   Allergen Reactions    Clams      Allergy testing confirmed    Codeine Itching    Shrimp      Peeling shrimp, got a rash, allergy test confirmed    Crab Rash     Throat closed       COMPREHENSIVE GYN HISTORY:  PAP History: Denies abnormal Paps.  Infection History: Denies STDs. Denies PID.  Benign History: Denies uterine fibroids. Denies ovarian cysts. Denies endometriosis. Denies other conditions.  Cancer History: Denies cervical cancer. Denies uterine cancer or hyperplasia. Denies ovarian cancer. Denies vulvar cancer or pre-cancer. Denies vaginal cancer or pre-cancer. Denies breast cancer. Denies colon cancer.  Sexual Activity History: Reports currently being sexually active  Menstrual History: Every 28 days, flows for 4 days. Light flow.  Dysmenorrhea History: Denies dysmenorrhea.  Contraception History:      ROS:  GENERAL: No fever or chills.  BREASTS: No pain. No lumps. No discharge.  ABDOMEN: No pain. No nausea. No vomiting. No diarrhea. No constipation.  REPRODUCTIVE: No abnormal bleeding.   VULVA: No pain. No lesions. No itching.  VAGINA: No relaxation. No itching. No odor. No discharge. No lesions.  URINARY: No incontinence. No nocturia. No frequency. No dysuria.    PE:  APPEARANCE: Well nourished, well developed, in no acute distress.  AFFECT: WNL, alert and oriented x 3.  ABDOMEN: Soft. No tenderness or masses. No hepatosplenomegaly. No hernias.  PELVIC: Normal external female genitalia without lesions. Normal hair distribution. Adequate perineal body, normal urethral meatus. Vagina pink and well rugated without lesions or discharge. Cervix pink without lesions, discharge or tenderness. No significant  cystocele or rectocele. Bimanual exam shows uterus to be 4-6 weeks size, regular, mobile and nontender. Adnexa without masses or tenderness.    PROCEDURES:    1. Essential hypertension    2. Gastroesophageal reflux disease without esophagitis    3. Vaginal discharge    4. PMB (postmenopausal bleeding)        PLAN:    Orders Placed This Encounter    C. trachomatis/N. gonorrhoeae by AMP DNA Cervix    Vaginosis Screen by DNA Probe       COUNSELING:  The patient was counseled today on:    FOLLOW-UP with me for EMBX

## 2018-04-21 LAB
C TRACH DNA SPEC QL NAA+PROBE: NOT DETECTED
N GONORRHOEA DNA SPEC QL NAA+PROBE: NOT DETECTED

## 2018-04-23 ENCOUNTER — TELEPHONE (OUTPATIENT)
Dept: OBSTETRICS AND GYNECOLOGY | Facility: CLINIC | Age: 58
End: 2018-04-23

## 2018-04-23 DIAGNOSIS — N76.0 BV (BACTERIAL VAGINOSIS): Primary | ICD-10-CM

## 2018-04-23 DIAGNOSIS — B96.89 BV (BACTERIAL VAGINOSIS): Primary | ICD-10-CM

## 2018-04-23 RX ORDER — METRONIDAZOLE 7.5 MG/G
1 GEL VAGINAL DAILY
Qty: 1 G | Refills: 0 | Status: SHIPPED | OUTPATIENT
Start: 2018-04-23 | End: 2018-04-30

## 2018-04-23 NOTE — TELEPHONE ENCOUNTER
I spoke to the pt and informed her that her medicine was sent to the pharmacy for the pick and gave her the precautions on what she can use to wash her under wear and clothes

## 2018-05-01 ENCOUNTER — HOSPITAL ENCOUNTER (EMERGENCY)
Facility: HOSPITAL | Age: 58
Discharge: HOME | End: 2018-05-01
Attending: FAMILY MEDICINE
Payer: MEDICAID

## 2018-05-01 VITALS
HEART RATE: 72 BPM | SYSTOLIC BLOOD PRESSURE: 137 MMHG | BODY MASS INDEX: 26.66 KG/M2 | WEIGHT: 180 LBS | TEMPERATURE: 98 F | DIASTOLIC BLOOD PRESSURE: 85 MMHG | RESPIRATION RATE: 20 BRPM | HEIGHT: 69 IN | OXYGEN SATURATION: 100 %

## 2018-05-01 DIAGNOSIS — R07.9 CHEST PAIN: Primary | ICD-10-CM

## 2018-05-01 LAB
ALBUMIN SERPL BCP-MCNC: 4.5 G/DL
ALP SERPL-CCNC: 101 U/L
ALT SERPL W/O P-5'-P-CCNC: 47 U/L
ANION GAP SERPL CALC-SCNC: 13 MMOL/L
AST SERPL-CCNC: 26 U/L
BASOPHILS # BLD AUTO: 0.03 K/UL
BASOPHILS NFR BLD: 0.6 %
BILIRUB SERPL-MCNC: 0.4 MG/DL
BUN SERPL-MCNC: 10 MG/DL
CALCIUM SERPL-MCNC: 10.1 MG/DL
CHLORIDE SERPL-SCNC: 100 MMOL/L
CO2 SERPL-SCNC: 31 MMOL/L
CREAT SERPL-MCNC: 0.87 MG/DL
D DIMER PPP FEU-MCNC: 365 NG/ML
DIFFERENTIAL METHOD: ABNORMAL
EOSINOPHIL # BLD AUTO: 0.1 K/UL
EOSINOPHIL NFR BLD: 2.1 %
ERYTHROCYTE [DISTWIDTH] IN BLOOD BY AUTOMATED COUNT: 14.4 %
EST. GFR  (AFRICAN AMERICAN): >60 ML/MIN/1.73 M^2
EST. GFR  (NON AFRICAN AMERICAN): >60 ML/MIN/1.73 M^2
GLUCOSE SERPL-MCNC: 108 MG/DL
HCT VFR BLD AUTO: 44 %
HGB BLD-MCNC: 13.5 G/DL
HYPOCHROMIA BLD QL SMEAR: ABNORMAL
LYMPHOCYTES # BLD AUTO: 2.1 K/UL
LYMPHOCYTES NFR BLD: 44.6 %
MCH RBC QN AUTO: 22.8 PG
MCHC RBC AUTO-ENTMCNC: 30.7 G/DL
MCV RBC AUTO: 75 FL
MONOCYTES # BLD AUTO: 0.4 K/UL
MONOCYTES NFR BLD: 9.1 %
NEUTROPHILS # BLD AUTO: 2.1 K/UL
NEUTROPHILS NFR BLD: 43.6 %
NT-PROBNP: 32 PG/ML
PLATELET # BLD AUTO: 256 K/UL
PLATELET BLD QL SMEAR: ABNORMAL
PMV BLD AUTO: 10.7 FL
POTASSIUM SERPL-SCNC: 3.5 MMOL/L
PROT SERPL-MCNC: 8.1 G/DL
RBC # BLD AUTO: 5.91 M/UL
SODIUM SERPL-SCNC: 144 MMOL/L
TROPONIN I SERPL DL<=0.01 NG/ML-MCNC: <0.012 NG/ML
TROPONIN I SERPL DL<=0.01 NG/ML-MCNC: <0.012 NG/ML
WBC # BLD AUTO: 4.73 K/UL

## 2018-05-01 PROCEDURE — 25500020 PHARM REV CODE 255: Performed by: FAMILY MEDICINE

## 2018-05-01 PROCEDURE — 84484 ASSAY OF TROPONIN QUANT: CPT

## 2018-05-01 PROCEDURE — 63600175 PHARM REV CODE 636 W HCPCS: Performed by: FAMILY MEDICINE

## 2018-05-01 PROCEDURE — 80053 COMPREHEN METABOLIC PANEL: CPT

## 2018-05-01 PROCEDURE — 99284 EMERGENCY DEPT VISIT MOD MDM: CPT | Mod: 25

## 2018-05-01 PROCEDURE — 96365 THER/PROPH/DIAG IV INF INIT: CPT

## 2018-05-01 PROCEDURE — 85379 FIBRIN DEGRADATION QUANT: CPT

## 2018-05-01 PROCEDURE — 25000003 PHARM REV CODE 250: Performed by: FAMILY MEDICINE

## 2018-05-01 PROCEDURE — 85025 COMPLETE CBC W/AUTO DIFF WBC: CPT

## 2018-05-01 PROCEDURE — 83880 ASSAY OF NATRIURETIC PEPTIDE: CPT

## 2018-05-01 PROCEDURE — 93005 ELECTROCARDIOGRAM TRACING: CPT

## 2018-05-01 PROCEDURE — C9113 INJ PANTOPRAZOLE SODIUM, VIA: HCPCS | Performed by: FAMILY MEDICINE

## 2018-05-01 PROCEDURE — 96366 THER/PROPH/DIAG IV INF ADDON: CPT

## 2018-05-01 PROCEDURE — 93010 ELECTROCARDIOGRAM REPORT: CPT | Mod: ,,, | Performed by: INTERNAL MEDICINE

## 2018-05-01 RX ORDER — ASPIRIN 325 MG
325 TABLET ORAL
Status: COMPLETED | OUTPATIENT
Start: 2018-05-01 | End: 2018-05-01

## 2018-05-01 RX ADMIN — DEXTROSE 40 MG: 50 INJECTION, SOLUTION INTRAVENOUS at 06:05

## 2018-05-01 RX ADMIN — ASPIRIN 325 MG ORAL TABLET 325 MG: 325 PILL ORAL at 06:05

## 2018-05-01 RX ADMIN — IOHEXOL 100 ML: 350 INJECTION, SOLUTION INTRAVENOUS at 08:05

## 2018-05-01 NOTE — ED NOTES
Pt stated started having chest pain and sob yesterday put thought it would subside. Vitals and sats are good, EKG performed at bedside.

## 2018-05-02 NOTE — ED NOTES
Patient resting quietly in bed. Patient respirations even and unlabored. Oxygen saturation remain within normal limits. Patient denies pain and is in no acute distress.

## 2018-05-02 NOTE — ED PROVIDER NOTES
Encounter Date: 5/1/2018       History     Chief Complaint   Patient presents with    Chest Pain     I am having chest pain for 3 days with shortness of breath.     Shortness of Breath     57-year-old female comes in with complaint of chest pain and shortness of breath for the last 3 days.  Patient states she does have a history of GERD and feels like this may possibly be associated with that but was nervous was decided to have it checked out.  Otherwise patient has no other complaints including no nausea vomiting no diarrhea.          Review of patient's allergies indicates:   Allergen Reactions    Clams      Allergy testing confirmed    Codeine Itching    Shrimp      Peeling shrimp, got a rash, allergy test confirmed    Crab Rash     Throat closed     Past Medical History:   Diagnosis Date    Cervical radiculopathy     GERD (gastroesophageal reflux disease)     Hypertension     Lumbar radiculopathy     Seasonal allergies      Past Surgical History:   Procedure Laterality Date    AXILLARY HIDRADENITIS EXCISION Bilateral     BUNIONECTOMY      GALLBLADDER SURGERY      TUBAL LIGATION       Family History   Problem Relation Age of Onset    Breast cancer Mother 53    Cancer Mother      breast cancer    Hypertension Mother     Ovarian cancer Cousin     Cancer Cousin      ovarian    Hypertension Sister     Breast cancer Sister 52    Glaucoma Father     Breast cancer Maternal Cousin     Breast cancer Maternal Cousin     Colon cancer Neg Hx      Social History   Substance Use Topics    Smoking status: Never Smoker    Smokeless tobacco: Never Used    Alcohol use Yes      Comment: socially     Review of Systems   Constitutional: Negative for fever.   HENT: Negative for sore throat.    Respiratory: Positive for shortness of breath.    Cardiovascular: Positive for chest pain.   Gastrointestinal: Negative for nausea.   Genitourinary: Negative for dysuria.   Musculoskeletal: Negative for back pain.    Skin: Negative for rash.   Neurological: Negative for weakness.   Hematological: Does not bruise/bleed easily.   All other systems reviewed and are negative.      Physical Exam     Initial Vitals [05/01/18 1745]   BP Pulse Resp Temp SpO2   138/81 80 15 97.9 °F (36.6 °C) 100 %      MAP       100         Physical Exam    Nursing note and vitals reviewed.  Constitutional: She appears well-developed.   HENT:   Head: Normocephalic and atraumatic.   Right Ear: External ear normal.   Left Ear: External ear normal.   Nose: Nose normal.   Mouth/Throat: Oropharynx is clear and moist.   Eyes: Conjunctivae and EOM are normal. Pupils are equal, round, and reactive to light. Right eye exhibits no discharge. Left eye exhibits no discharge.   Neck: Normal range of motion. Neck supple. No tracheal deviation present.   Cardiovascular: Normal rate, regular rhythm and normal heart sounds.   No murmur heard.  Pulmonary/Chest: Breath sounds normal. No respiratory distress. She has no wheezes.   Abdominal: Soft. Bowel sounds are normal.   Neurological: She is alert and oriented to person, place, and time.   Skin: Skin is warm and dry.         ED Course   Procedures  Labs Reviewed   CBC W/ AUTO DIFFERENTIAL - Abnormal; Notable for the following:        Result Value    RBC 5.91 (*)     MCV 75 (*)     MCH 22.8 (*)     MCHC 30.7 (*)     All other components within normal limits   COMPREHENSIVE METABOLIC PANEL - Abnormal; Notable for the following:     CO2 31 (*)     ALT 47 (*)     All other components within normal limits   D DIMER - Abnormal; Notable for the following:     D-Dimer 365 (*)     All other components within normal limits   TROPONIN I   TROPONIN I   NT-PRO NATRIURETIC PEPTIDE     EKG Readings: (Independently Interpreted)   EKG shows normal sinus rhythm with a rate of 80 with no ST segment elevation depression or T-wave inversion no ectopy normal conduction no signs of STEMI.       X-Rays:   Independently Interpreted Readings:    Other Readings:  Xray reviewed by myself and is negative. Awaiting radiology report.      Medical Decision Making:   Initial Assessment:   Patient sitting in no distress and pleasant. Patient has no other complaints other than documented.     Differential Diagnosis:   Angina, unstable angina, hypertension, hypertension urgency, hypertension emergency, myocardial infarction  t                      Clinical Impression:   The encounter diagnosis was Chest pain.                           Michael Rock MD  05/01/18 [id number]

## 2018-05-02 NOTE — ED NOTES
Patient resting quietly in bed. Patient in no apparent pain or distress. Protonix infusing at this time.

## 2018-05-09 ENCOUNTER — PROCEDURE VISIT (OUTPATIENT)
Dept: OBSTETRICS AND GYNECOLOGY | Facility: CLINIC | Age: 58
End: 2018-05-09
Payer: MEDICAID

## 2018-05-09 VITALS — WEIGHT: 183 LBS | BODY MASS INDEX: 27.11 KG/M2 | HEIGHT: 69 IN

## 2018-05-09 DIAGNOSIS — N92.6 IRREGULAR BLEEDING: Primary | ICD-10-CM

## 2018-05-09 LAB
B-HCG UR QL: NEGATIVE
CTP QC/QA: YES

## 2018-05-09 PROCEDURE — 81025 URINE PREGNANCY TEST: CPT | Mod: PBBFAC | Performed by: OBSTETRICS & GYNECOLOGY

## 2018-05-09 PROCEDURE — 88305 TISSUE EXAM BY PATHOLOGIST: CPT | Mod: 26,,, | Performed by: PATHOLOGY

## 2018-05-09 PROCEDURE — 88305 TISSUE EXAM BY PATHOLOGIST: CPT | Performed by: PATHOLOGY

## 2018-05-09 PROCEDURE — 58100 BIOPSY OF UTERUS LINING: CPT | Mod: PBBFAC | Performed by: OBSTETRICS & GYNECOLOGY

## 2018-05-09 RX ORDER — KETOCONAZOLE 20 MG/ML
SHAMPOO, SUSPENSION TOPICAL
COMMUNITY
Start: 2018-05-07 | End: 2021-03-04 | Stop reason: SDUPTHER

## 2018-05-09 RX ORDER — KETOCONAZOLE 20 MG/G
1 CREAM TOPICAL
COMMUNITY
Start: 2018-05-07

## 2018-05-09 RX ORDER — CLINDAMYCIN PHOSPHATE 10 MG/G
GEL TOPICAL
COMMUNITY
Start: 2018-05-07 | End: 2019-03-28

## 2018-05-14 NOTE — PROCEDURES
Biopsy (Gynecological)  Performed by: DONNA WILLIAMSON  Authorized by: DONNA WILLIAMSON     Consent Done?:  Yes (Written)   Patient was prepped and draped in the normal sterile fashion.  Local anesthesia used?: No      Biopsy Location:  Uterus  Estimated blood loss (cc):  10   Patient tolerated the procedure well with no immediate complications.     Pelvic rest for 2 weeks. Bleeding, pain and fever precautions given.

## 2018-05-16 ENCOUNTER — TELEPHONE (OUTPATIENT)
Dept: GASTROENTEROLOGY | Facility: CLINIC | Age: 58
End: 2018-05-16

## 2018-05-16 ENCOUNTER — OFFICE VISIT (OUTPATIENT)
Dept: INTERNAL MEDICINE | Facility: CLINIC | Age: 58
End: 2018-05-16
Payer: MEDICAID

## 2018-05-16 VITALS — OXYGEN SATURATION: 98 % | HEART RATE: 67 BPM | HEIGHT: 69 IN | BODY MASS INDEX: 27.2 KG/M2 | WEIGHT: 183.63 LBS

## 2018-05-16 DIAGNOSIS — R07.89 CHEST DISCOMFORT: ICD-10-CM

## 2018-05-16 DIAGNOSIS — R06.02 SHORTNESS OF BREATH: Primary | ICD-10-CM

## 2018-05-16 DIAGNOSIS — K21.9 GASTROESOPHAGEAL REFLUX DISEASE WITHOUT ESOPHAGITIS: ICD-10-CM

## 2018-05-16 PROCEDURE — 99214 OFFICE O/P EST MOD 30 MIN: CPT | Mod: S$PBB,,, | Performed by: INTERNAL MEDICINE

## 2018-05-16 PROCEDURE — 99999 PR PBB SHADOW E&M-EST. PATIENT-LVL III: CPT | Mod: PBBFAC,,, | Performed by: INTERNAL MEDICINE

## 2018-05-16 PROCEDURE — 99213 OFFICE O/P EST LOW 20 MIN: CPT | Mod: PBBFAC | Performed by: INTERNAL MEDICINE

## 2018-05-16 RX ORDER — PANTOPRAZOLE SODIUM 40 MG/1
40 TABLET, DELAYED RELEASE ORAL 2 TIMES DAILY
Qty: 60 TABLET | Refills: 0 | Status: SHIPPED | OUTPATIENT
Start: 2018-05-16 | End: 2018-05-30 | Stop reason: SDUPTHER

## 2018-05-16 NOTE — PROGRESS NOTES
"Subjective:      Patient ID: Leschia T Bastian is a 57 y.o. female.    Chief Complaint: Hospital Follow Up and Shortness of Breath    HPI:  HPI   Symptoms started 2 weeks ago with chest pain and shortness of breath. She went to the ER, EKG, CTA and treated for a history of reflux but was not discharged on any PPI. As long as she was laying down she was fine, when she is upright she had pain in the central chest.     Patient had cholecystectomy but is aware that she has had a hiatal hernia.    Diet: handout  Weight:    ER report: 5/1/2018  Patient Active Problem List   Diagnosis    Essential hypertension    GERD (gastroesophageal reflux disease)    Anxiety    Obstructive sleep apnea treated with continuous positive airway pressure (CPAP)    Mild vitamin D deficiency     Past Medical History:   Diagnosis Date    Cervical radiculopathy     GERD (gastroesophageal reflux disease)     Hypertension     Lumbar radiculopathy     Seasonal allergies      Past Surgical History:   Procedure Laterality Date    AXILLARY HIDRADENITIS EXCISION Bilateral     BUNIONECTOMY      GALLBLADDER SURGERY      TUBAL LIGATION       Family History   Problem Relation Age of Onset    Breast cancer Mother 53    Cancer Mother         breast cancer    Hypertension Mother     Ovarian cancer Cousin     Cancer Cousin         ovarian    Hypertension Sister     Breast cancer Sister 52    Glaucoma Father     Breast cancer Maternal Cousin     Breast cancer Maternal Cousin     Colon cancer Neg Hx      Review of Systems see above  Objective:     Vitals:    05/16/18 0803   Pulse: 67   SpO2: 98%   Weight: 83.3 kg (183 lb 10.3 oz)   Height: 5' 9" (1.753 m)   PainSc: 0-No pain     Body mass index is 27.12 kg/m².  Physical Exam   Constitutional: She is oriented to person, place, and time. She appears well-developed and well-nourished. No distress.   Neck: Carotid bruit is not present. No thyromegaly present.   Cardiovascular: Normal rate, " regular rhythm and normal heart sounds.  PMI is not displaced.    Pulmonary/Chest: Effort normal and breath sounds normal. No respiratory distress.   Abdominal: Soft. Bowel sounds are normal. She exhibits no distension. There is no tenderness.   Musculoskeletal: She exhibits no edema.   Neurological: She is alert and oriented to person, place, and time.     Assessment:     1. Shortness of breath    2. Gastroesophageal reflux disease without esophagitis    3. Chest discomfort      Plan:   Leschia was seen today for hospital follow up and shortness of breath.    Diagnoses and all orders for this visit:    Shortness of breath: evaluation  -     Ambulatory consult to Cardiology  -     Complete PFT w/o bronchodilator; Future    Gastroesophageal reflux disease without esophagitis: evaluation  -     Ambulatory consult to Gastroenterology    Chest discomfort: evaluaton, ER negative  -     Ambulatory consult to Cardiology    Other orders: trial of medication  -     pantoprazole (PROTONIX) 40 MG tablet; Take 1 tablet (40 mg total) by mouth 2 (two) times daily. Take 30 minutes prior to breakfast, and at bedtime      Follow-up in about 2 weeks (around 5/30/2018) for Follow up.     Medication List          Accurate as of 5/16/18  9:04 AM. If you have any questions, ask your nurse or doctor.               START taking these medications    pantoprazole 40 MG tablet  Commonly known as:  PROTONIX  Take 1 tablet (40 mg total) by mouth 2 (two) times daily. Take 30 minutes prior to breakfast, and at bedtime  Started by:  Karina Gomez MD        CONTINUE taking these medications    * ALLER-EASE ORAL     * ALLER-EASE 180 MG tablet  Generic drug:  fexofenadine  TAKE 1 TABLET BY MOUTH ONCE A DAY     amLODIPine 5 MG tablet  Commonly known as:  NORVASC  TAKE 1 TABLET BY MOUTH EVERY DAY (STOP LOSARTAN)     aspirin-acetaminophen-caffeine 250-250-65 mg 250-250-65 mg per tablet  Commonly known as:  EXCEDRIN MIGRAINE     clindamycin phosphate  1% 1 % gel  Commonly known as:  CLINDAGEL     * EPINEPHrine 0.3 mg/0.3 mL Atin  Commonly known as:  EPIPEN     * EPINEPHrine 0.3 mg/0.3 mL Atin  Commonly known as:  EPIPEN     hydroCHLOROthiazide 12.5 mg capsule  Commonly known as:  MICROZIDE  TAKE 1 CAPSULE BY MOUTH EVERY DAY     hydrocortisone 2.5 % cream     * ketoconazole 2 % cream  Commonly known as:  NIZORAL     * ketoconazole 2 % shampoo  Commonly known as:  NIZORAL     * loratadine 10 mg Cap     * loratadine 10 mg tablet  Commonly known as:  CLARITIN     * triamcinolone acetonide 0.1% 0.1 % paste  Commonly known as:  KENALOG     * triamcinolone acetonide 0.1% 0.1 % cream  Commonly known as:  KENALOG        * This list has 10 medication(s) that are the same as other medications prescribed for you. Read the directions carefully, and ask your doctor or other care provider to review them with you.            STOP taking these medications    ranitidine 150 MG capsule  Commonly known as:  ZANTAC  Stopped by:  Karina Gomez MD     ranitidine 150 MG tablet  Commonly known as:  ZANTAC  Stopped by:  Karina Gomez MD           Where to Get Your Medications      These medications were sent to [facility]  [address] [place] [id number]    Hours:  Mon-Fri, 8a-5:30p Phone:  [phone]   · pantoprazole 40 MG tablet

## 2018-05-16 NOTE — TELEPHONE ENCOUNTER
----- Message from Clotilde Kee sent at 5/16/2018  9:27 AM CDT -----  Contact: ezequiel ford - [phone]  Is asking for appt for Gastroesophageal reflux disease without esophagitis [K21.9] - nothing available for me to book - please call patient at

## 2018-05-16 NOTE — TELEPHONE ENCOUNTER
----- Message from Jennifer Zayas MA sent at 5/16/2018  9:52 AM CDT -----  Contact: ezequiel ford - [phone]  This is Dr Heart patient.  ----- Message -----  From: Clotilde Kee  Sent: 5/16/2018   9:27 AM  To: [facility] Clinical Staff    Is asking for appt for Gastroesophageal reflux disease without esophagitis [K21.9] - nothing available for me to book - please call patient at

## 2018-05-16 NOTE — PATIENT INSTRUCTIONS
Tips to Control Acid Reflux    To control acid reflux, youll need to make some basic diet and lifestyle changes. The simple steps outlined below may be all youll need to ease discomfort.  Watch what you eat  · Avoid fatty foods and spicy foods.  · Eat fewer acidic foods, such as citrus and tomato-based foods. These can increase symptoms.  · Limit drinking alcohol, caffeine, and fizzy beverages. All increase acid reflux.  · Try limiting chocolate, peppermint, and spearmint. These can worsen acid reflux in some people.  Watch when you eat  · Avoid lying down for 3 hours after eating.  · Do not snack before going to bed.  Raise your head  Raising your head and upper body by 4 to 6 inches helps limit reflux when youre lying down. Put blocks under the head of your bed frame to raise it.  Other changes  · Lose weight, if you need to  · Dont exercise near bedtime  · Avoid tight-fitting clothes  · Limit aspirin and ibuprofen  · Stop smoking   Date Last Reviewed: 7/1/2016 © 2000-2017 [employer]. [address] [place] [address]. All rights reserved. This information is not intended as a substitute for professional medical care. Always follow your healthcare professional's instructions.        Medicines for Acid Reflux  Your healthcare provider has told you that you have acid reflux. This condition causes stomach acid to wash up into your throat. For most people, acid reflux is troubling but not dangerous. But left untreated, acid reflux sometimes damages the esophagus. Medicines can help control acid reflux and limit your risk of future problems.  Medicines for acid reflux  Your healthcare provider may prescribe medicine to help treat your acid reflux. Medicine will be based on your symptoms and any test results. Your provider will explain how to take your medicine. You will also be told about possible side effects.  Reducing stomach acid  Your provider may suggest antacids that you can buy  over the counter. Antacids can give fast relief. Or you may be told to take a type of medicine called H2 blockers. These are available over the counter and by prescription (for higher doses).  Blocking stomach acid  In more severe cases, your healthcare provider may suggest stronger medicines such as proton pump inhibitors (PPIs). These keep the stomach from making acid. They are often prescribed for long-term use.  Other medicines  In some cases medicines to reduce or block stomach acid may not work. Then you may be switched to another type of medicine that helps your stomach empty better.     Date Last Reviewed: 10/1/2016  © 4962-5512 [employer]. [address] [place] [address], [place] [address]. All rights reserved. This information is not intended as a substitute for professional medical care. Always follow your healthcare professional's instructions.        What Is a Hiatal Hernia?    Hiatal hernia is when the area where the stomach and esophagus meet bulges up through the diaphragm into the chest cavity. In some cases, part of the stomach may bulge above the diaphragm. Stomach acid may move up into the esophagus and cause symptoms. The symptoms are often blamed on gastroesophageal reflux disease (GERD). You may only know about the hernia when it shows up on an X-ray taken for other reasons.   What you may feel  The hiatus is a normal hole in the diaphragm. The esophagus passes through this hole and leads to the stomach. In some cases, part of the stomach may bulge above the diaphragm. This bulge is called a hernia. Stomach acid may move up into the esophagus and cause symptoms.  When you eat, the muscle at the hiatus relaxes to allow food to pass into the stomach. It tightens again to keep food and digestive acids in the stomach.  Many people with hiatal hernias have mild symptoms. You may notice the following GERD symptoms:  · Heartburn or other chest discomfort  · A feeling of chest fullness after a  meal  · Frequent burping  · Acid taste in the mouth  · Trouble swallowing  Treating symptoms  If you have been diagnosed with hiatal hernia, these suggestions may help improve symptoms:  · Lose excess weight. Extra weight puts pressure on the stomach and esophagus.  · Dont lie down after eating. Sit up for at least an hour after eating. Lying down after eating can increase symptoms.  · Avoid certain foods and drinks. These include fatty foods, chocolate, coffee, mint, and other foods that cause symptoms for you.  · Dont smoke or drink alcohol. These can worsen symptoms.  · Look at your medicines. Discuss your medicines with your healthcare provider. Many medicines can cause symptoms.  · Consider an antacid medicine. Ask your healthcare provider about over-the-counter and prescription medicines that may help.  · Ask about surgery, if needed. Surgery is a treatment choice for some people. Your healthcare provider can determine if surgery is an option for you.    Date Last Reviewed: 10/1/2016  © 3660-2079 [employer]. [address] [place] [address], [place] [address]. All rights reserved. This information is not intended as a substitute for professional medical care. Always follow your healthcare professional's instructions.

## 2018-05-17 ENCOUNTER — TELEPHONE (OUTPATIENT)
Dept: PHARMACY | Facility: CLINIC | Age: 58
End: 2018-05-17

## 2018-05-17 NOTE — TELEPHONE ENCOUNTER
Good Afternoon.    The prior authorization for Alise Olsen's Pantoprazole prescription with BID dosing has been approved through 5/17/2019.    The patient has been notified and is aware of the approval.    If there are any additional questions about the approval please contact the pharmacy at, [phone].    Thanks.    Ava Dennis, ,  Patient Care Advocate   [facility]  [email or web address]

## 2018-05-22 ENCOUNTER — HOSPITAL ENCOUNTER (OUTPATIENT)
Dept: PULMONOLOGY | Facility: CLINIC | Age: 58
Discharge: HOME | End: 2018-05-22
Payer: MEDICAID

## 2018-05-22 DIAGNOSIS — R06.02 SHORTNESS OF BREATH: ICD-10-CM

## 2018-05-22 LAB
PRE FEV1 FVC: 80
PRE FEV1: 2.08
PRE FVC: 2.6
PREDICTED FEV1 FVC: 78
PREDICTED FEV1: 2.79
PREDICTED FVC: 3.51

## 2018-05-22 PROCEDURE — 94010 BREATHING CAPACITY TEST: CPT | Mod: 26,S$PBB,, | Performed by: INTERNAL MEDICINE

## 2018-05-22 PROCEDURE — 94729 DIFFUSING CAPACITY: CPT | Mod: PBBFAC | Performed by: INTERNAL MEDICINE

## 2018-05-22 PROCEDURE — 94010 BREATHING CAPACITY TEST: CPT | Mod: PBBFAC | Performed by: INTERNAL MEDICINE

## 2018-05-22 PROCEDURE — 94727 GAS DIL/WSHOT DETER LNG VOL: CPT | Mod: PBBFAC | Performed by: INTERNAL MEDICINE

## 2018-05-22 PROCEDURE — 94727 GAS DIL/WSHOT DETER LNG VOL: CPT | Mod: 26,S$PBB,, | Performed by: INTERNAL MEDICINE

## 2018-05-22 PROCEDURE — 94729 DIFFUSING CAPACITY: CPT | Mod: 26,S$PBB,, | Performed by: INTERNAL MEDICINE

## 2018-05-30 ENCOUNTER — OFFICE VISIT (OUTPATIENT)
Dept: OBSTETRICS AND GYNECOLOGY | Facility: CLINIC | Age: 58
End: 2018-05-30
Payer: MEDICAID

## 2018-05-30 ENCOUNTER — OFFICE VISIT (OUTPATIENT)
Dept: INTERNAL MEDICINE | Facility: CLINIC | Age: 58
End: 2018-05-30
Payer: MEDICAID

## 2018-05-30 ENCOUNTER — TELEPHONE (OUTPATIENT)
Dept: INTERNAL MEDICINE | Facility: CLINIC | Age: 58
End: 2018-05-30

## 2018-05-30 VITALS
BODY MASS INDEX: 27.07 KG/M2 | DIASTOLIC BLOOD PRESSURE: 82 MMHG | OXYGEN SATURATION: 97 % | SYSTOLIC BLOOD PRESSURE: 124 MMHG | WEIGHT: 182.75 LBS | HEART RATE: 71 BPM | HEIGHT: 69 IN

## 2018-05-30 VITALS
BODY MASS INDEX: 27.11 KG/M2 | HEIGHT: 69 IN | DIASTOLIC BLOOD PRESSURE: 70 MMHG | SYSTOLIC BLOOD PRESSURE: 120 MMHG | WEIGHT: 183 LBS

## 2018-05-30 DIAGNOSIS — F41.9 ANXIETY: Primary | ICD-10-CM

## 2018-05-30 DIAGNOSIS — N85.01 SIMPLE ENDOMETRIAL HYPERPLASIA WITHOUT ATYPIA: ICD-10-CM

## 2018-05-30 DIAGNOSIS — I10 ESSENTIAL HYPERTENSION: Chronic | ICD-10-CM

## 2018-05-30 DIAGNOSIS — R06.02 SHORTNESS OF BREATH: ICD-10-CM

## 2018-05-30 DIAGNOSIS — K21.9 GASTROESOPHAGEAL REFLUX DISEASE WITHOUT ESOPHAGITIS: Primary | ICD-10-CM

## 2018-05-30 DIAGNOSIS — K21.9 GASTROESOPHAGEAL REFLUX DISEASE WITHOUT ESOPHAGITIS: ICD-10-CM

## 2018-05-30 PROCEDURE — 99999 PR PBB SHADOW E&M-EST. PATIENT-LVL II: CPT | Mod: PBBFAC,,, | Performed by: OBSTETRICS & GYNECOLOGY

## 2018-05-30 PROCEDURE — 99212 OFFICE O/P EST SF 10 MIN: CPT | Mod: PBBFAC,27 | Performed by: OBSTETRICS & GYNECOLOGY

## 2018-05-30 PROCEDURE — 99214 OFFICE O/P EST MOD 30 MIN: CPT | Mod: PBBFAC | Performed by: INTERNAL MEDICINE

## 2018-05-30 PROCEDURE — 99214 OFFICE O/P EST MOD 30 MIN: CPT | Mod: S$PBB,,, | Performed by: INTERNAL MEDICINE

## 2018-05-30 PROCEDURE — 99999 PR PBB SHADOW E&M-EST. PATIENT-LVL IV: CPT | Mod: PBBFAC,,, | Performed by: INTERNAL MEDICINE

## 2018-05-30 PROCEDURE — 99212 OFFICE O/P EST SF 10 MIN: CPT | Mod: S$PBB,,, | Performed by: OBSTETRICS & GYNECOLOGY

## 2018-05-30 RX ORDER — PANTOPRAZOLE SODIUM 40 MG/1
40 TABLET, DELAYED RELEASE ORAL DAILY
Qty: 30 TABLET | Refills: 4 | Status: SHIPPED | OUTPATIENT
Start: 2018-05-30 | End: 2019-06-12 | Stop reason: SDUPTHER

## 2018-05-30 RX ORDER — ALBUTEROL SULFATE 90 UG/1
2 AEROSOL, METERED RESPIRATORY (INHALATION) EVERY 6 HOURS PRN
Qty: 18 G | Refills: 1 | Status: SHIPPED | OUTPATIENT
Start: 2018-05-30 | End: 2020-06-01 | Stop reason: SDUPTHER

## 2018-05-30 NOTE — TELEPHONE ENCOUNTER
----- Message from Jennifer De Anda sent at 5/30/2018  9:56 AM CDT -----  Contact: Jeannette with [facility]  [phone]  Pharmacy is calling to clarify an RX.  RX name:  Pantoprazole ( Protonix) 40 mg   What do they need to clarify:  Directions and Qty  Comments:

## 2018-05-30 NOTE — PROGRESS NOTES
HISTORY OF PRESENT ILLNESS:    Leschia T Bastian is a 57 y.o. female  Patient's last menstrual period was 2018. presents today complaining of    Typically spotting intermittently throughout the months. Last cycle 10/2017  Occasional midline pelvic pain     Expectant vs surgical management discussed in detail. Patient very concerned secondary to her family history of cancer and she desires to proceed with definitive surgery. Risks and benefits discussed in detail.     Narrative     Ultrasound pelvis complete with transvaginal.    Findings: Transabdominal and transvaginal imaging was acquired.    The uterus measures 10 x 5.8 x 6.2 cm and contains multiple fibroids the largest of which measuring 2.6 cm within the posterior wall. The endometrial stripe measures 1.2 cm. The right and left ovaries measure 2.1 x 2.1 x 1.3 cm and 1.8 x 1.1 x 2.1 cm respectively. The ovaries have a normal appearance. There are several large nabothian cysts.   Impression      Leiomyoma.       ORDERING PHYSICIAN(S)  DONNA WILLIAMSON  PreOperative Diagnosis  1. Menorrhagia.  2. Cervix.  PostOperative Diagnosis  cp  SPECIMEN  1) Endometrial biopsy.  2) Cervix.  FINAL PATHOLOGIC DIAGNOSIS  1. ENDOMETRIUM, BIOPSY:  -Focal simple hyperplasia without atypia in a background of inactive endometrium with breakdown.  2. CERVIX, BIOPSY:  -Ulcerated inflamed granulation tissue.  -Negative for malignancy.  Part 1 was also reviewed by Dr. Frias who agrees with the diagnosis.      Past Medical History:   Diagnosis Date    Cervical radiculopathy     GERD (gastroesophageal reflux disease)     Hypertension     Lumbar radiculopathy     Seasonal allergies        Past Surgical History:   Procedure Laterality Date    AXILLARY HIDRADENITIS EXCISION Bilateral     BUNIONECTOMY      GALLBLADDER SURGERY      TUBAL LIGATION         MEDICATIONS AND ALLERGIES:      Current Outpatient Prescriptions:     albuterol 90 mcg/actuation inhaler, Inhale 2  puffs into the lungs every 6 (six) hours as needed for Wheezing. Rescue, Disp: 18 g, Rfl: 1    ALLER-EASE 180 mg tablet, TAKE 1 TABLET BY MOUTH ONCE A DAY, Disp: 30 tablet, Rfl: 0    aspirin-acetaminophen-caffeine 250-250-65 mg (EXCEDRIN MIGRAINE) 250-250-65 mg per tablet, Take 1 tablet by mouth every 6 (six) hours as needed for Pain., Disp: , Rfl:     clindamycin phosphate 1% (CLINDAGEL) 1 % gel, , Disp: , Rfl:     EPINEPHrine (EPIPEN) 0.3 mg/0.3 mL AtIn, one injection, Disp: , Rfl:     EPINEPHrine (EPIPEN) 0.3 mg/0.3 mL AtIn, , Disp: , Rfl:     FEXOFENADINE HCL (ALLER-EASE ORAL), Take by mouth., Disp: , Rfl:     hydrocortisone 2.5 % cream, , Disp: , Rfl:     ketoconazole (NIZORAL) 2 % cream, , Disp: , Rfl:     ketoconazole (NIZORAL) 2 % shampoo, , Disp: , Rfl:     loratadine (CLARITIN) 10 mg tablet, , Disp: , Rfl:     loratadine 10 mg Cap, 1 tablet, Disp: , Rfl:     pantoprazole (PROTONIX) 40 MG tablet, Take 1 tablet (40 mg total) by mouth once daily. Reduced to one a day after a month of twice a day, Disp: 30 tablet, Rfl: 4    triamcinolone acetonide 0.1% (KENALOG) 0.1 % cream, , Disp: , Rfl:     triamcinolone acetonide 0.1% (KENALOG) 0.1 % paste, 1 application to affected area, Disp: , Rfl:     amLODIPine (NORVASC) 5 MG tablet, TAKE ONE TABLET BY MOUTH EVERY DAY (STOP LOSARTAN), Disp: 30 tablet, Rfl: 2    hydroCHLOROthiazide (MICROZIDE) 12.5 mg capsule, Take 1 capsule (12.5 mg total) by mouth once daily., Disp: 90 capsule, Rfl: 3    Review of patient's allergies indicates:   Allergen Reactions    Clams      Allergy testing confirmed    Codeine Itching    Shrimp      Peeling shrimp, got a rash, allergy test confirmed    Crab Rash     Throat closed       COMPREHENSIVE GYN HISTORY:  PAP History: Denies abnormal Paps.  Infection History: Denies STDs. Denies PID.  Benign History: Denies uterine fibroids. Denies ovarian cysts. Denies endometriosis. Denies other conditions.  Cancer History: Denies  cervical cancer. Denies uterine cancer or hyperplasia. Denies ovarian cancer. Denies vulvar cancer or pre-cancer. Denies vaginal cancer or pre-cancer. Denies breast cancer. Denies colon cancer.  Sexual Activity History: Reports currently being sexually active  Menstrual History: Every 28 days, flows for 4 days. Light flow.  Dysmenorrhea History: Denies dysmenorrhea.  Contraception History:      ROS:  GENERAL: No fever or chills.  BREASTS: No pain. No lumps. No discharge.  ABDOMEN: No pain. No nausea. No vomiting. No diarrhea. No constipation.  REPRODUCTIVE: No abnormal bleeding.   VULVA: No pain. No lesions. No itching.  VAGINA: No relaxation. No itching. No odor. No discharge. No lesions.  URINARY: No incontinence. No nocturia. No frequency. No dysuria.    PE:  APPEARANCE: Well nourished, well developed, in no acute distress.  AFFECT: WNL, alert and oriented x 3.  Deferred      1. Anxiety    2. Essential hypertension    3. Gastroesophageal reflux disease without esophagitis    4. Simple endometrial hyperplasia without atypia        COUNSELING:  The patient was counseled today on above in detail. She is strongly considering surgical intervention.     FOLLOW-UP with me pending management.

## 2018-05-30 NOTE — PROGRESS NOTES
"Subjective:      Patient ID: Leschia T Bastian is a 57 y.o. female.    Chief Complaint: Follow-up    HPI:  HPI   Follow up: Patient states that she is doing better. Her chest discomfort has decreased on the pantoprazole twice a day.    Shortness of breath: PFT is normal but some restriction. We will try albuterol.  Patient Active Problem List   Diagnosis    Essential hypertension    GERD (gastroesophageal reflux disease)    Anxiety    Obstructive sleep apnea treated with continuous positive airway pressure (CPAP)    Mild vitamin D deficiency    Shortness of breath     Past Medical History:   Diagnosis Date    Cervical radiculopathy     GERD (gastroesophageal reflux disease)     Hypertension     Lumbar radiculopathy     Seasonal allergies      Past Surgical History:   Procedure Laterality Date    AXILLARY HIDRADENITIS EXCISION Bilateral     BUNIONECTOMY      GALLBLADDER SURGERY      TUBAL LIGATION       Family History   Problem Relation Age of Onset    Breast cancer Mother 53    Cancer Mother         breast cancer    Hypertension Mother     Ovarian cancer Cousin     Cancer Cousin         ovarian    Hypertension Sister     Breast cancer Sister 52    Glaucoma Father     Breast cancer Maternal Cousin     Breast cancer Maternal Cousin     Colon cancer Neg Hx      Review of Systems   Constitutional: Negative for chills, fever and unexpected weight change.   HENT: Negative for trouble swallowing.    Respiratory: Positive for shortness of breath. Negative for cough and wheezing.         SOB better   Cardiovascular: Negative for chest pain and palpitations.   Gastrointestinal: Negative for abdominal distention, abdominal pain, blood in stool and vomiting.        GERD better    Musculoskeletal: Negative for back pain.     Objective:     Vitals:    05/30/18 0933   BP: 124/82   Pulse: 71   SpO2: 97%   Weight: 82.9 kg (182 lb 12.2 oz)   Height: 5' 9" (1.753 m)   PainSc: 0-No pain     Body mass index is " 26.99 kg/m².  Physical Exam   Constitutional: She is oriented to person, place, and time. She appears well-developed and well-nourished. No distress.   Neck: Carotid bruit is not present. No thyromegaly present.   Cardiovascular: Normal rate, regular rhythm and normal heart sounds.  PMI is not displaced.    Pulmonary/Chest: Effort normal and breath sounds normal. No respiratory distress.   Abdominal: Soft. Bowel sounds are normal. She exhibits no distension. There is no tenderness.   Musculoskeletal: She exhibits no edema.   Neurological: She is alert and oriented to person, place, and time.     Assessment:     1. Gastroesophageal reflux disease without esophagitis    2. Shortness of breath      Plan:   Leschia was seen today for follow-up.    Diagnoses and all orders for this visit:    Gastroesophageal reflux disease without esophagitis: after one month of pantoprazole twice a day and then will decrease to once a day and follow up.    Shortness of breath: albuterol inhaler as a trial    Other orders  -     pantoprazole (PROTONIX) 40 MG tablet; Take 1 tablet (40 mg total) by mouth once daily. Reduced to one a day after a month of twice a day      Follow-up in about 6 weeks (around 7/11/2018) for Follow up.     Medication List          Accurate as of 5/30/18  9:59 AM. If you have any questions, ask your nurse or doctor.               START taking these medications    albuterol 90 mcg/actuation inhaler  Inhale 2 puffs into the lungs every 6 (six) hours as needed for Wheezing. Rescue  Started by:  Karina Gomez MD        CHANGE how you take these medications    pantoprazole 40 MG tablet  Commonly known as:  PROTONIX  Take 1 tablet (40 mg total) by mouth once daily. Reduced to one a day after a month of twice a day  What changed:  · when to take this  · additional instructions  Changed by:  Karina Gomez MD        CONTINUE taking these medications    * ALLER-EASE ORAL     * ALLER-EASE 180 MG tablet  Generic drug:   fexofenadine  TAKE 1 TABLET BY MOUTH ONCE A DAY     amLODIPine 5 MG tablet  Commonly known as:  NORVASC  TAKE 1 TABLET BY MOUTH EVERY DAY (STOP LOSARTAN)     aspirin-acetaminophen-caffeine 250-250-65 mg 250-250-65 mg per tablet  Commonly known as:  EXCEDRIN MIGRAINE     clindamycin phosphate 1% 1 % gel  Commonly known as:  CLINDAGEL     * EPINEPHrine 0.3 mg/0.3 mL Atin  Commonly known as:  EPIPEN     * EPINEPHrine 0.3 mg/0.3 mL Atin  Commonly known as:  EPIPEN     hydroCHLOROthiazide 12.5 mg capsule  Commonly known as:  MICROZIDE  TAKE 1 CAPSULE BY MOUTH EVERY DAY     hydrocortisone 2.5 % cream     * ketoconazole 2 % cream  Commonly known as:  NIZORAL     * ketoconazole 2 % shampoo  Commonly known as:  NIZORAL     * loratadine 10 mg Cap     * loratadine 10 mg tablet  Commonly known as:  CLARITIN     * triamcinolone acetonide 0.1% 0.1 % paste  Commonly known as:  KENALOG     * triamcinolone acetonide 0.1% 0.1 % cream  Commonly known as:  KENALOG        * This list has 10 medication(s) that are the same as other medications prescribed for you. Read the directions carefully, and ask your doctor or other care provider to review them with you.               Where to Get Your Medications      These medications were sent to [facility] - [facility]  [address] [address] [address] [place] [id number]    Phone:  [phone]   · albuterol 90 mcg/actuation inhaler  · pantoprazole 40 MG tablet

## 2018-06-01 ENCOUNTER — TELEPHONE (OUTPATIENT)
Dept: OBSTETRICS AND GYNECOLOGY | Facility: CLINIC | Age: 58
End: 2018-06-01

## 2018-06-01 DIAGNOSIS — R10.2 PELVIC PAIN: ICD-10-CM

## 2018-06-01 DIAGNOSIS — N85.01 ENDOMETRIAL HYPERPLASIA WITHOUT ATYPIA, SIMPLE: Primary | ICD-10-CM

## 2018-06-01 RX ORDER — HYDROCHLOROTHIAZIDE 12.5 MG/1
12.5 CAPSULE ORAL DAILY
Qty: 90 CAPSULE | Refills: 3 | Status: CANCELLED | OUTPATIENT
Start: 2018-06-01

## 2018-06-01 RX ORDER — HYDROCHLOROTHIAZIDE 12.5 MG/1
12.5 CAPSULE ORAL DAILY
Qty: 90 CAPSULE | Refills: 3 | Status: SHIPPED | OUTPATIENT
Start: 2018-06-01 | End: 2021-11-10

## 2018-06-01 RX ORDER — AMLODIPINE BESYLATE 5 MG/1
TABLET ORAL
Qty: 30 TABLET | Refills: 2 | Status: SHIPPED | OUTPATIENT
Start: 2018-06-01 | End: 2018-11-28 | Stop reason: SDUPTHER

## 2018-06-05 NOTE — TELEPHONE ENCOUNTER
Spoke with patient and options discussed again in detail. She desires to proceed with hysterectomy.

## 2018-06-06 ENCOUNTER — OFFICE VISIT (OUTPATIENT)
Dept: CARDIOLOGY | Facility: CLINIC | Age: 58
End: 2018-06-06
Payer: MEDICAID

## 2018-06-06 VITALS
HEART RATE: 85 BPM | SYSTOLIC BLOOD PRESSURE: 119 MMHG | OXYGEN SATURATION: 100 % | BODY MASS INDEX: 27.54 KG/M2 | WEIGHT: 181.69 LBS | DIASTOLIC BLOOD PRESSURE: 78 MMHG | HEIGHT: 68 IN

## 2018-06-06 DIAGNOSIS — I10 ESSENTIAL HYPERTENSION: Chronic | ICD-10-CM

## 2018-06-06 DIAGNOSIS — R07.89 ATYPICAL CHEST PAIN: Primary | ICD-10-CM

## 2018-06-06 DIAGNOSIS — R06.09 DOE (DYSPNEA ON EXERTION): ICD-10-CM

## 2018-06-06 PROCEDURE — 99214 OFFICE O/P EST MOD 30 MIN: CPT | Mod: S$GLB,,, | Performed by: INTERNAL MEDICINE

## 2018-06-06 NOTE — LETTER
June 6, 2018      Karina Gomez MD  [address]  [facility] [address]           [facility]  [address] [address]  Phone: [phone]  Fax: [phone]          Patient: Leschia T Bastian   MR Number: [id number]   YOB: 1960   Date of Visit: 6/6/2018       Dear Dr. Karina Gomez:    Thank you for referring Leschia Bastian to me for evaluation. Attached you will find relevant portions of my assessment and plan of care.    If you have questions, please do not hesitate to call me. I look forward to following Leschia Bastian along with you.    Sincerely,    Mohan Krasu MD    Enclosure  CC:  No Recipients    If you would like to receive this communication electronically, please contact [email or web address] or [phone] to request more information on [facility] access.    For providers and/or their staff who would like to refer a patient to [facility], please contact us through our one-stop-shop provider referral line, [facility], at [phone].    If you feel you have received this communication in error or would no longer like to receive these types of communications, please e-mail [email or web address]

## 2018-06-06 NOTE — PROGRESS NOTES
Subjective:   Patient ID:  Leschia T Bastian is a 57 y.o. female who presents for evaluation of Shortness of Breath and Chest Pain (sharp pain and Heaviness )      HPI: 58 y/o AA female with PMH of HTN present to establish care secondary to visit to the ER in early May for chest pain. Pain is describe as located mid sternally and heavy/aching in quality. Discomfort is mild to moderate in intensity. It is intermittent in timing and last a few seconds to minutes when it does occur. Last episode was this morning. Discomfort is sometime felt in neck and left arm. ER w/u was negative. CTA negative for PE and troponin was negative. ECG is normal. She is not a smoker or diabetic. BP is controlled. She does have some remote family history of CAD. She is having associated dyspnea especially with exertion. She use to do line dancing but not have been able to since April.   No vomiting but she does have nausea. No fever/chills. No diaphoresis.    Past Medical History:   Diagnosis Date    Cervical radiculopathy     GERD (gastroesophageal reflux disease)     Hypertension     Lumbar radiculopathy     Seasonal allergies        Past Surgical History:   Procedure Laterality Date    AXILLARY HIDRADENITIS EXCISION Bilateral     BUNIONECTOMY      GALLBLADDER SURGERY      TUBAL LIGATION         Social History   Substance Use Topics    Smoking status: Never Smoker    Smokeless tobacco: Never Used    Alcohol use Yes      Comment: socially       Family History   Problem Relation Age of Onset    Breast cancer Mother 53    Cancer Mother         breast cancer    Hypertension Mother     Ovarian cancer Cousin     Cancer Cousin         ovarian    Hypertension Sister     Breast cancer Sister 52    Glaucoma Father     Breast cancer Maternal Cousin     Breast cancer Maternal Cousin     Heart disease Maternal Grandfather     Heart disease Paternal Grandfather     Colon cancer Neg Hx        Patient's Medications   New  Prescriptions    No medications on file   Previous Medications    ALBUTEROL 90 MCG/ACTUATION INHALER    Inhale 2 puffs into the lungs every 6 (six) hours as needed for Wheezing. Rescue    ALLER-EASE 180 MG TABLET    TAKE 1 TABLET BY MOUTH ONCE A DAY    AMLODIPINE (NORVASC) 5 MG TABLET    TAKE ONE TABLET BY MOUTH EVERY DAY (STOP LOSARTAN)    ASPIRIN-ACETAMINOPHEN-CAFFEINE 250-250-65 MG (EXCEDRIN MIGRAINE) 250-250-65 MG PER TABLET    Take 1 tablet by mouth every 6 (six) hours as needed for Pain.    CLINDAMYCIN PHOSPHATE 1% (CLINDAGEL) 1 % GEL        EPINEPHRINE (EPIPEN) 0.3 MG/0.3 ML ATIN    one injection    EPINEPHRINE (EPIPEN) 0.3 MG/0.3 ML ATIN        FEXOFENADINE HCL (ALLER-EASE ORAL)    Take by mouth.    HYDROCHLOROTHIAZIDE (MICROZIDE) 12.5 MG CAPSULE    Take 1 capsule (12.5 mg total) by mouth once daily.    HYDROCORTISONE 2.5 % CREAM        KETOCONAZOLE (NIZORAL) 2 % CREAM        KETOCONAZOLE (NIZORAL) 2 % SHAMPOO        LORATADINE (CLARITIN) 10 MG TABLET        LORATADINE 10 MG CAP    1 tablet    PANTOPRAZOLE (PROTONIX) 40 MG TABLET    Take 1 tablet (40 mg total) by mouth once daily. Reduced to one a day after a month of twice a day    TRIAMCINOLONE ACETONIDE 0.1% (KENALOG) 0.1 % CREAM        TRIAMCINOLONE ACETONIDE 0.1% (KENALOG) 0.1 % PASTE    1 application to affected area   Modified Medications    No medications on file   Discontinued Medications    No medications on file        Review of patient's allergies indicates:   Allergen Reactions    Clams      Allergy testing confirmed    Codeine Itching    Shrimp      Peeling shrimp, got a rash, allergy test confirmed    Crab Rash     Throat closed       Review of Systems   Constitution: Negative.   HENT: Negative.    Eyes: Negative.    Cardiovascular: Positive for chest pain and dyspnea on exertion.   Respiratory: Negative.    Endocrine: Negative.    Hematologic/Lymphatic: Negative.    Skin: Negative.    Musculoskeletal: Negative.    Gastrointestinal:  Negative.    Neurological: Negative.    Psychiatric/Behavioral: Negative.    Allergic/Immunologic: Negative.      Objective:   Physical Exam   Constitutional: She is oriented to person, place, and time. She appears well-developed and well-nourished. No distress.   Examination of the digits showed no clubbing or cyanosis   HENT:   Head: Normocephalic and atraumatic.   Eyes: Conjunctivae are normal. Pupils are equal, round, and reactive to light. Right eye exhibits no discharge.   Neck: Normal range of motion. Neck supple. No JVD present. No thyromegaly present.   No carotid bruits   Cardiovascular: Normal rate, regular rhythm, S1 normal, S2 normal, normal heart sounds, intact distal pulses and normal pulses.  PMI is not displaced.  Exam reveals no gallop, no friction rub and no opening snap.    No murmur heard.  Pulmonary/Chest: Effort normal and breath sounds normal. No respiratory distress. She has no wheezes. She has no rales. She exhibits no tenderness.   Abdominal: Soft. Bowel sounds are normal. She exhibits no distension and no mass. There is no tenderness. There is no guarding.   No hepatosplenomegaly   Musculoskeletal: Normal range of motion. She exhibits no edema or tenderness.   Lymphadenopathy:     She has no cervical adenopathy.   Neurological: She is alert and oriented to person, place, and time.   Skin: Skin is warm. No rash noted. She is not diaphoretic. No erythema.   Psychiatric: She has a normal mood and affect.   Nursing note and vitals reviewed.      Lab Results   Component Value Date    WBC 4.73 05/01/2018    HGB 13.5 05/01/2018    HCT 44.0 05/01/2018    MCV 75 (L) 05/01/2018     05/01/2018         Chemistry        Component Value Date/Time     05/01/2018 1808    K 3.5 05/01/2018 1808     05/01/2018 1808    CO2 31 (H) 05/01/2018 1808    BUN 10 05/01/2018 1808    CREATININE 0.87 05/01/2018 1808     05/01/2018 1808        Component Value Date/Time    CALCIUM 10.1  05/01/2018 1808    ALKPHOS 101 05/01/2018 1808    AST 26 05/01/2018 1808    ALT 47 (H) 05/01/2018 1808    BILITOT 0.4 05/01/2018 1808    ESTGFRAFRICA >60.0 05/01/2018 1808    EGFRNONAA >60.0 05/01/2018 1808            Lab Results   Component Value Date    CHOL 206 (H) 08/01/2017    CHOL 154 07/21/2016    CHOL 148 07/21/2015     Lab Results   Component Value Date    HDL 63 08/01/2017    HDL 49 07/21/2016    HDL 51 07/21/2015     Lab Results   Component Value Date    LDLCALC 120.4 08/01/2017    LDLCALC 90.8 07/21/2016    LDLCALC 77.6 07/21/2015     Lab Results   Component Value Date    TRIG 113 08/01/2017    TRIG 71 07/21/2016    TRIG 97 07/21/2015     Lab Results   Component Value Date    CHOLHDL 30.6 08/01/2017    CHOLHDL 31.8 07/21/2016    CHOLHDL 34.5 07/21/2015       Lab Results   Component Value Date    TSH 0.769 07/21/2016       Lab Results   Component Value Date    HGBA1C 5.0 07/21/2015       ECGs reviewed-NSR  LABS reviewed      Assessment:     1. Atypical chest pain    2. Essential hypertension    3. WELLS (dyspnea on exertion)        Plan:     Patient is having atypical chest pain. Risk factors include family history and HTN.   Will get stress echo  Continue current medications  Activity as tolerate  F/u in 2 months. Phone review of results. Will have return sooner if stress is positive.       Clinic time spent with patient discussing and treating medical condition including reviewing images, ECG, labs and medications > 40 minutes.

## 2018-06-12 ENCOUNTER — HOSPITAL ENCOUNTER (OUTPATIENT)
Dept: CARDIOLOGY | Facility: HOSPITAL | Age: 58
Discharge: HOME | End: 2018-06-12
Attending: INTERNAL MEDICINE
Payer: MEDICAID

## 2018-06-12 DIAGNOSIS — R07.89 ATYPICAL CHEST PAIN: ICD-10-CM

## 2018-06-12 DIAGNOSIS — R06.09 DOE (DYSPNEA ON EXERTION): ICD-10-CM

## 2018-06-12 LAB
DIASTOLIC DYSFUNCTION: NO
ESTIMATED PA SYSTOLIC PRESSURE: 22.89
MITRAL VALVE MOBILITY: NORMAL
RETIRED EF AND QEF - SEE NOTES: 55 (ref 55–65)
TRICUSPID VALVE REGURGITATION: NORMAL

## 2018-06-12 PROCEDURE — 93320 DOPPLER ECHO COMPLETE: CPT | Mod: 26,,, | Performed by: INTERNAL MEDICINE

## 2018-06-12 PROCEDURE — 93325 DOPPLER ECHO COLOR FLOW MAPG: CPT | Mod: 26,,, | Performed by: INTERNAL MEDICINE

## 2018-06-12 PROCEDURE — 93351 STRESS TTE COMPLETE: CPT | Mod: 26,,, | Performed by: INTERNAL MEDICINE

## 2018-06-12 PROCEDURE — 93325 DOPPLER ECHO COLOR FLOW MAPG: CPT | Mod: PO

## 2018-06-14 ENCOUNTER — HOSPITAL ENCOUNTER (OUTPATIENT)
Dept: PREADMISSION TESTING | Facility: OTHER | Age: 58
Discharge: HOME | End: 2018-06-14
Attending: OBSTETRICS & GYNECOLOGY
Payer: MEDICAID

## 2018-06-14 ENCOUNTER — ANESTHESIA EVENT (OUTPATIENT)
Dept: SURGERY | Facility: OTHER | Age: 58
DRG: 743 | End: 2018-06-14
Payer: MEDICAID

## 2018-06-14 ENCOUNTER — OFFICE VISIT (OUTPATIENT)
Dept: OBSTETRICS AND GYNECOLOGY | Facility: CLINIC | Age: 58
End: 2018-06-14
Payer: MEDICAID

## 2018-06-14 VITALS
WEIGHT: 182 LBS | DIASTOLIC BLOOD PRESSURE: 87 MMHG | SYSTOLIC BLOOD PRESSURE: 132 MMHG | BODY MASS INDEX: 27.58 KG/M2 | HEIGHT: 68 IN | OXYGEN SATURATION: 97 % | TEMPERATURE: 99 F | HEART RATE: 64 BPM

## 2018-06-14 VITALS
SYSTOLIC BLOOD PRESSURE: 132 MMHG | BODY MASS INDEX: 27.74 KG/M2 | HEIGHT: 68 IN | WEIGHT: 183 LBS | DIASTOLIC BLOOD PRESSURE: 90 MMHG

## 2018-06-14 DIAGNOSIS — G47.33 OBSTRUCTIVE SLEEP APNEA TREATED WITH CONTINUOUS POSITIVE AIRWAY PRESSURE (CPAP): Chronic | ICD-10-CM

## 2018-06-14 DIAGNOSIS — K21.9 GASTROESOPHAGEAL REFLUX DISEASE WITHOUT ESOPHAGITIS: ICD-10-CM

## 2018-06-14 DIAGNOSIS — F41.9 ANXIETY: Primary | ICD-10-CM

## 2018-06-14 DIAGNOSIS — E55.9 MILD VITAMIN D DEFICIENCY: ICD-10-CM

## 2018-06-14 DIAGNOSIS — N85.00 ENDOMETRIAL HYPERPLASIA: ICD-10-CM

## 2018-06-14 DIAGNOSIS — I10 ESSENTIAL HYPERTENSION: Chronic | ICD-10-CM

## 2018-06-14 DIAGNOSIS — D25.9 UTERINE LEIOMYOMA, UNSPECIFIED LOCATION: ICD-10-CM

## 2018-06-14 LAB
ABO + RH BLD: NORMAL
BASOPHILS # BLD AUTO: 0.01 K/UL
BASOPHILS NFR BLD: 0.3 %
BLD GP AB SCN CELLS X3 SERPL QL: NORMAL
CANDIDA RRNA VAG QL PROBE: NEGATIVE
DIFFERENTIAL METHOD: ABNORMAL
EOSINOPHIL # BLD AUTO: 0.1 K/UL
EOSINOPHIL NFR BLD: 2.4 %
ERYTHROCYTE [DISTWIDTH] IN BLOOD BY AUTOMATED COUNT: 14.1 %
G VAGINALIS RRNA GENITAL QL PROBE: POSITIVE
HCT VFR BLD AUTO: 40.9 %
HGB BLD-MCNC: 12.5 G/DL
LYMPHOCYTES # BLD AUTO: 1.9 K/UL
LYMPHOCYTES NFR BLD: 49.2 %
MCH RBC QN AUTO: 23.2 PG
MCHC RBC AUTO-ENTMCNC: 30.6 G/DL
MCV RBC AUTO: 76 FL
MONOCYTES # BLD AUTO: 0.3 K/UL
MONOCYTES NFR BLD: 7.4 %
NEUTROPHILS # BLD AUTO: 1.5 K/UL
NEUTROPHILS NFR BLD: 40.4 %
PLATELET # BLD AUTO: 211 K/UL
PMV BLD AUTO: 10.8 FL
RBC # BLD AUTO: 5.38 M/UL
T VAGINALIS RRNA GENITAL QL PROBE: NEGATIVE
WBC # BLD AUTO: 3.78 K/UL

## 2018-06-14 PROCEDURE — 86901 BLOOD TYPING SEROLOGIC RH(D): CPT

## 2018-06-14 PROCEDURE — 99999 PR PBB SHADOW E&M-EST. PATIENT-LVL III: CPT | Mod: PBBFAC,,, | Performed by: OBSTETRICS & GYNECOLOGY

## 2018-06-14 PROCEDURE — 99213 OFFICE O/P EST LOW 20 MIN: CPT | Mod: S$PBB,,, | Performed by: OBSTETRICS & GYNECOLOGY

## 2018-06-14 PROCEDURE — 36415 COLL VENOUS BLD VENIPUNCTURE: CPT

## 2018-06-14 PROCEDURE — 85025 COMPLETE CBC W/AUTO DIFF WBC: CPT

## 2018-06-14 PROCEDURE — 99213 OFFICE O/P EST LOW 20 MIN: CPT | Mod: PBBFAC,25 | Performed by: OBSTETRICS & GYNECOLOGY

## 2018-06-14 PROCEDURE — 87480 CANDIDA DNA DIR PROBE: CPT

## 2018-06-14 PROCEDURE — 87510 GARDNER VAG DNA DIR PROBE: CPT

## 2018-06-14 RX ORDER — MIDAZOLAM HYDROCHLORIDE 5 MG/ML
5 INJECTION INTRAMUSCULAR; INTRAVENOUS ONCE AS NEEDED
Status: CANCELLED | OUTPATIENT
Start: 2018-06-14 | End: 2018-06-14

## 2018-06-14 RX ORDER — OXYCODONE HYDROCHLORIDE 5 MG/1
5 TABLET ORAL ONCE AS NEEDED
Status: CANCELLED | OUTPATIENT
Start: 2018-06-14 | End: 2018-06-14

## 2018-06-14 RX ORDER — LIDOCAINE HYDROCHLORIDE 10 MG/ML
0.5 INJECTION, SOLUTION EPIDURAL; INFILTRATION; INTRACAUDAL; PERINEURAL ONCE
Status: CANCELLED | OUTPATIENT
Start: 2018-06-14 | End: 2018-06-14

## 2018-06-14 RX ORDER — SODIUM CHLORIDE, SODIUM LACTATE, POTASSIUM CHLORIDE, CALCIUM CHLORIDE 600; 310; 30; 20 MG/100ML; MG/100ML; MG/100ML; MG/100ML
INJECTION, SOLUTION INTRAVENOUS CONTINUOUS
Status: CANCELLED | OUTPATIENT
Start: 2018-06-14

## 2018-06-14 NOTE — DISCHARGE INSTRUCTIONS
PRE-ADMIT TESTING -  [phone]    [address]  [place] [facility]          Your surgery has been scheduled at [facility]. We are pleased to have the opportunity to serve you. For Further Information please call [phone].    On the day of surgery please report to the Information Desk on the 1st floor.    · CONTACT YOUR PHYSICIAN'S OFFICE THE DAY PRIOR TO YOUR SURGERY TO OBTAIN YOUR ARRIVAL TIME.     · The evening before surgery do not eat anything after 9 p.m. ( this includes hard candy, chewing gum and mints).  You may only have GATORADE, POWERADE AND WATER  from 9 p.m. until you leave your home.   DO NOT DRINK ANY LIQUIDS ON THE WAY TO THE HOSPITAL.      SPECIAL MEDICATION INSTRUCTIONS: TAKE medications checked off by the Anesthesiologist on your Medication List.    Angiogram Patients: Take medications as instructed by your physician, including aspirin.     Surgery Patients:    If you take ASPIRIN - Your PHYSICIAN/SURGEON will need to inform you IF/OR when you need to stop taking aspirin prior to your surgery.     Do Not take any medications containing IBUPROFEN.  Do Not Wear any make-up or dark nail polish   (especially eye make-up) to surgery. If you come to surgery with makeup on you will be required to remove the makeup or nail polish.    Do not shave your surgical area at least 5 days prior to your surgery. The surgical prep will be performed at the hospital according to Infection Control regulations.    Leave all valuables at home.   Do Not wear any jewelry or watches, including any metal in body piercings.  Contact Lens must be removed before surgery. Either do not wear the contact lens or bring a case and solution for storage.  Please bring a container for eyeglasses or dentures as required.  Bring any paperwork your physician has provided, such as consent forms,  history and physicals, doctor's orders, etc.   Bring comfortable clothes that are loose fitting to wear upon  discharge. Take into consideration the type of surgery being performed.  Maintain your diet as advised per your physician the day prior to surgery.      Adequate rest the night before surgery is advised.   Park in the Parking lot behind the hospital or in the [facility] across the street from the parking lot. Parking is complimentary.  If you will be discharged the same day as your procedure, please arrange for a responsible adult to drive you home or to accompany you if traveling by taxi.   YOU WILL NOT BE PERMITTED TO DRIVE OR TO LEAVE THE HOSPITAL ALONE AFTER SURGERY.   It is strongly recommended that you arrange for someone to remain with you for the first 24 hrs following your surgery.       Thank you for your cooperation.  The Staff of [facility].        Bathing Instructions                                                                 Please shower the evening before and morning of your procedure with    ANTIBACTERIAL SOAP. ( DIAL, etc )  Concentrate on the surgical area   for at least 3 minutes and rinse completely. Dry off as usual.   Do not use any deodorant, powder, body lotions, perfume, after shave or    cologne.

## 2018-06-14 NOTE — ANESTHESIA PREPROCEDURE EVALUATION
06/14/2018  Leschia T Bastian is a 57 y.o., female.    Anesthesia Evaluation    I have reviewed the Patient Summary Reports.    I have reviewed the Nursing Notes.   I have reviewed the Medications.     Review of Systems  Anesthesia Hx:  No problems with previous Anesthesia    Social:  Non-Smoker, Social Alcohol Use    Hematology/Oncology:  Hematology Normal   Oncology Normal     EENT/Dental:EENT/Dental Normal   Cardiovascular:   Exercise tolerance: good Hypertension, well controlled WELLS  Functional Capacity Can you climb two flights of stairs? ==> Yes    Pulmonary:   Shortness of breath Sleep Apnea, CPAP    Renal/:  Renal/ Normal     Hepatic/GI:   GERD, well controlled    Musculoskeletal:  Musculoskeletal Normal    Neurological:   Denies CVA. Neuromuscular Disease,  Denies Seizures.    Endocrine:  Endocrine Normal    Dermatological:  Skin Normal    Psych:  Psychiatric Normal           Physical Exam  General:  Well nourished, Obesity    Airway/Jaw/Neck:  Airway Findings: Mouth Opening: Normal Tongue: Normal  General Airway Assessment: Adult, Average  Mallampati: I  TM Distance: Normal, at least 6 cm  Jaw/Neck Findings:  Neck ROM: Normal ROM  Neck Findings:     Eyes/Ears/Nose:  EYES/EARS/NOSE FINDINGS: Normal   Dental:  Dental Findings: In tact   Chest/Lungs:  Chest/Lungs Findings: Clear to auscultation     Heart/Vascular:  Heart Findings: Rate: Normal  Rhythm: Regular Rhythm  Sounds: Normal        Mental Status:  Mental Status Findings:  Alert and Oriented, Cooperative         Anesthesia Plan  Type of Anesthesia, risks & benefits discussed:  Anesthesia Type:  general  Patient's Preference:   Intra-op Monitoring Plan: standard ASA monitors  Intra-op Monitoring Plan Comments:   Post Op Pain Control Plan: per primary service following discharge from PACU  Post Op Pain Control Plan Comments:   Induction:    IV  Beta Blocker:         Informed Consent: Patient understands risks and agrees with Anesthesia plan.  Questions answered. Anesthesia consent signed with patient.  ASA Score: 2     Day of Surgery Review of History & Physical:    H&P update referred to the surgeon.     Anesthesia Plan Notes: CBC and T&S.        Ready For Surgery From Anesthesia Perspective.

## 2018-06-15 ENCOUNTER — TELEPHONE (OUTPATIENT)
Dept: OBSTETRICS AND GYNECOLOGY | Facility: CLINIC | Age: 58
End: 2018-06-15

## 2018-06-15 NOTE — TELEPHONE ENCOUNTER
Informed patient of culture results and phoned in prescription per Dr. Bradley. Patient is having surgery 6/22/18.

## 2018-06-15 NOTE — TELEPHONE ENCOUNTER
----- Message from Hakan Christianson sent at 6/15/2018  4:38 PM CDT -----  Pt calling for results of her test [phone]

## 2018-06-19 NOTE — PROGRESS NOTES
"HISTORY OF PRESENT ILLNESS:    Leschia T Bastian is a 57 y.o. female, , Patient's last menstrual period was 2018.,  presents for a pre-op exam for TLH/BSO scheduled on 2018. Patient with focal simple endometrial hyperplasia without atypia on EMBX performed for irregular bleeding. Patient has a history of spotting intermittently throughout the months. Last "normal" cycle 10/2017. Patient also reports midline pelvic pain. Medical vs. surgical management discussed in detail for treatment of endometrial hyperplasia. Patient very concerned secondary to her family history of cancer and she desires to proceed with definitive surgery with hysterectomy. Risks and benefits of procedure discussed in detail. A discussion was also held in reference to ovarian hormone production, a woman's lifetime risk of developing ovarian cancer and the possible need for reoperation for ovarian pathology. She desires to have ovaries removed at the time of surgery.    Long discussion held patient today concerning her surgery, hospital course on the day of surgery and post operative course. Precautions after surgery discussed in detail.  Risks, alternatives and benefits of surgery discussed with patient in detail and consent explained in detail. Questions were answered and patient voices understanding. Plan pre-op with Anesthesia at [facility].    Narrative     Ultrasound pelvis complete with transvaginal.    Findings: Transabdominal and transvaginal imaging was acquired.    The uterus measures 10 x 5.8 x 6.2 cm and contains multiple fibroids the largest of which measuring 2.6 cm within the posterior wall. The endometrial stripe measures 1.2 cm. The right and left ovaries measure 2.1 x 2.1 x 1.3 cm and 1.8 x 1.1 x 2.1 cm respectively. The ovaries have a normal appearance. There are several large nabothian cysts.   Impression      Leiomyoma.       ORDERING PHYSICIAN(S)  DONNA WILLIAMSON  PreOperative Diagnosis  1. " Menorrhagia.  2. Cervix.  PostOperative Diagnosis  cp  SPECIMEN  1) Endometrial biopsy.  2) Cervix.  FINAL PATHOLOGIC DIAGNOSIS  1. ENDOMETRIUM, BIOPSY:  -Focal simple hyperplasia without atypia in a background of inactive endometrium with breakdown.  2. CERVIX, BIOPSY:  -Ulcerated inflamed granulation tissue.  -Negative for malignancy.  Part 1 was also reviewed by Dr. Frias who agrees with the diagnosis.    Past Medical History:   Diagnosis Date    Atypical chest pain     Cervical radiculopathy     GERD (gastroesophageal reflux disease)     Hypertension     Lumbar radiculopathy     JAYLON on CPAP     Seasonal allergies     Shortness of breath        Past Surgical History:   Procedure Laterality Date    AXILLARY HIDRADENITIS EXCISION Bilateral     BUNIONECTOMY      GALLBLADDER SURGERY      TUBAL LIGATION         MEDICATIONS AND ALLERGIES:      Current Outpatient Prescriptions:     albuterol 90 mcg/actuation inhaler, Inhale 2 puffs into the lungs every 6 (six) hours as needed for Wheezing. Rescue, Disp: 18 g, Rfl: 1    ALLER-EASE 180 mg tablet, TAKE 1 TABLET BY MOUTH ONCE A DAY, Disp: 30 tablet, Rfl: 0    amLODIPine (NORVASC) 5 MG tablet, TAKE ONE TABLET BY MOUTH EVERY DAY (STOP LOSARTAN), Disp: 30 tablet, Rfl: 2    aspirin-acetaminophen-caffeine 250-250-65 mg (EXCEDRIN MIGRAINE) 250-250-65 mg per tablet, Take 1 tablet by mouth every 6 (six) hours as needed for Pain., Disp: , Rfl:     clindamycin phosphate 1% (CLINDAGEL) 1 % gel, , Disp: , Rfl:     EPINEPHrine (EPIPEN) 0.3 mg/0.3 mL AtIn, one injection, Disp: , Rfl:     hydroCHLOROthiazide (MICROZIDE) 12.5 mg capsule, Take 1 capsule (12.5 mg total) by mouth once daily., Disp: 90 capsule, Rfl: 3    hydrocortisone 2.5 % cream, , Disp: , Rfl:     ketoconazole (NIZORAL) 2 % cream, , Disp: , Rfl:     ketoconazole (NIZORAL) 2 % shampoo, , Disp: , Rfl:     loratadine (CLARITIN) 10 mg tablet, once daily. , Disp: , Rfl:     pantoprazole (PROTONIX) 40 MG  tablet, Take 1 tablet (40 mg total) by mouth once daily. Reduced to one a day after a month of twice a day, Disp: 30 tablet, Rfl: 4    ranitidine (ZANTAC) 150 MG tablet, Take 150 mg by mouth once daily., Disp: , Rfl:     triamcinolone acetonide 0.1% (KENALOG) 0.1 % cream, , Disp: , Rfl:     Review of patient's allergies indicates:   Allergen Reactions    Clams      Allergy testing confirmed    Codeine Itching    Scallops     Shrimp      Peeling shrimp, got a rash, allergy test confirmed    Crab Rash     Throat closed       Family History   Problem Relation Age of Onset    Breast cancer Mother 53    Cancer Mother         breast cancer    Hypertension Mother     Ovarian cancer Cousin     Cancer Cousin         ovarian    Hypertension Sister     Breast cancer Sister 52    Glaucoma Father     Breast cancer Maternal Cousin     Breast cancer Maternal Cousin     Heart disease Maternal Grandfather     Heart disease Paternal Grandfather     Colon cancer Neg Hx        Social History     Social History    Marital status: Legally      Spouse name: N/A    Number of children: N/A    Years of education: N/A     Occupational History    Not on file.     Social History Main Topics    Smoking status: Never Smoker    Smokeless tobacco: Never Used    Alcohol use Yes      Comment: socially    Drug use: No    Sexual activity: Not Currently     Partners: Male     Other Topics Concern    Not on file     Social History Narrative    No narrative on file       COMPREHENSIVE GYN HISTORY:  PAP History: Denies abnormal Paps.  Infection History: Denies STDs. Denies PID.  Benign History: Denies uterine fibroids. Denies ovarian cysts. Denies endometriosis. Denies other conditions.  Cancer History: Denies cervical cancer. Denies uterine cancer or hyperplasia. Denies ovarian cancer. Denies vulvar cancer or pre-cancer. Denies vaginal cancer or pre-cancer. Denies breast cancer. Denies colon cancer.  Sexual Activity  "History: Reports currently being sexually active  Menstrual History: Monthly. Mod then light flow.   Dysmenorrhea History: Reports mild dysmenorrhea.     ROS:  GENERAL: No weight changes. No swelling. No fatigue. No fever.  CARDIOVASCULAR: No chest pain. No shortness of breath. No leg cramps.   NEUROLOGICAL: No headaches. No vision changes.  BREASTS: No pain. No lumps. No discharge.  ABDOMEN: No pain. No nausea. No vomiting. No diarrhea. No constipation.  REPRODUCTIVE: No abnormal bleeding.   VULVA: No pain. No lesions. No itching.  VAGINA: No relaxation. No itching. No odor. No discharge. No lesions.  URINARY: No incontinence. No nocturia. No frequency. No dysuria.    BP (!) 132/90   Ht 5' 8" (1.727 m)   Wt 83 kg (182 lb 15.7 oz)   LMP 05/01/2018   BMI 27.82 kg/m²     PE:  APPEARANCE: Well nourished, well developed, in no acute distress.  AFFECT: WNL, alert and oriented x 3.  SKIN: No acne or hirsutism.  NECK: Neck symmetric, without masses or thyromegaly.  NODES: No inguinal, cervical, axillary or femoral lymph node enlargement.  CHEST: Good respiratory effort.   ABDOMEN: Soft. No tenderness or masses. No hepatosplenomegaly. No hernias.  BREASTS: Symmetrical, no skin changes, visible lesions, palpable masses or nipple discharge bilaterally.  PELVIC: External female genitalia without lesions.  Female hair distribution. Adequate perineal body, Normal urethral meatus. Vagina moist and well rugated without lesions or discharge.  No significant cystocele or rectocele present. Cervix pink without lesions, discharge or tenderness. Uterus is 8-10 week size, regular, mobile and nontender. Adnexa without masses or tenderness.  EXTREMITIES: No edema    DIAGNOSIS:  1. Anxiety    2. Essential hypertension    3. Mild vitamin D deficiency    4. Gastroesophageal reflux disease without esophagitis    5. Obstructive sleep apnea treated with continuous positive airway pressure (CPAP)    6. Endometrial hyperplasia    7. Uterine " leiomyoma, unspecified location      PLAN:    Orders Placed This Encounter    Vaginosis Screen by DNA Probe     LABS AND TESTS ORDERED:  Pre-op orders and T&S ordered    COUNSELING:  Post op counseling performed, questions answered    Plan TLNNEKA/NADRA on 6/22/2018.     FOLLOW-UP with me for post op visit.

## 2018-06-20 ENCOUNTER — TELEPHONE (OUTPATIENT)
Dept: OBSTETRICS AND GYNECOLOGY | Facility: CLINIC | Age: 58
End: 2018-06-20

## 2018-06-20 NOTE — TELEPHONE ENCOUNTER
----- Message from Anita Avalos sent at 6/20/2018  4:01 PM CDT -----  Contact: Pt  Name of Who is Calling: BASTIAN,LESCHIA T [[id number]]      What is the request in detail: Patient states she would like to speak with the staff to find out what she can use to cleanse her body prior to surgery......Please contact to further discuss and advise       Can the clinic reply by [facility]: No      [facility]: [phone]

## 2018-06-21 ENCOUNTER — TELEPHONE (OUTPATIENT)
Dept: OBSTETRICS AND GYNECOLOGY | Facility: CLINIC | Age: 58
End: 2018-06-21

## 2018-06-21 NOTE — TELEPHONE ENCOUNTER
I left a message for the pt that she needs to come into surgery for 7 am and nothing to eat or drink 10 hours before her arriaval time. Pt was also informed to do her bowel prep at 2pm today and 6 pm today.

## 2018-06-21 NOTE — TELEPHONE ENCOUNTER
----- Message from Sebastian Cox sent at 6/21/2018 12:41 PM CDT -----  Contact: BASTIAN,LESCHIA T [[id number]]  Name of Who is Calling: BASTIAN,LESCHIA T [[id number]]      What is the request in detail: Patient returning a call in regards to questions she has about prep. Would like to know if it is a half of bottle at 2 and 6 or a whole bottle at 2/ whole bottle at 6? Also wants to know if she can start later due to being at work?      Can the clinic reply by [facility]: no      [facility]: [phone]

## 2018-06-21 NOTE — TELEPHONE ENCOUNTER
Pt was informed to send a family member over to get the form to fill out for her disability papers

## 2018-06-22 ENCOUNTER — HOSPITAL ENCOUNTER (INPATIENT)
Facility: OTHER | Age: 58
LOS: 2 days | Discharge: HOME | DRG: 743 | End: 2018-06-24
Attending: OBSTETRICS & GYNECOLOGY | Admitting: OBSTETRICS & GYNECOLOGY
Payer: MEDICAID

## 2018-06-22 ENCOUNTER — ANESTHESIA (OUTPATIENT)
Dept: SURGERY | Facility: OTHER | Age: 58
DRG: 743 | End: 2018-06-22
Payer: MEDICAID

## 2018-06-22 DIAGNOSIS — N85.00 ENDOMETRIAL HYPERPLASIA: ICD-10-CM

## 2018-06-22 DIAGNOSIS — K21.9 GASTROESOPHAGEAL REFLUX DISEASE WITHOUT ESOPHAGITIS: ICD-10-CM

## 2018-06-22 DIAGNOSIS — N92.0 MENORRHAGIA WITH REGULAR CYCLE: Primary | ICD-10-CM

## 2018-06-22 DIAGNOSIS — G47.33 OBSTRUCTIVE SLEEP APNEA TREATED WITH CONTINUOUS POSITIVE AIRWAY PRESSURE (CPAP): Chronic | ICD-10-CM

## 2018-06-22 DIAGNOSIS — Z90.710 S/P TAH (TOTAL ABDOMINAL HYSTERECTOMY): ICD-10-CM

## 2018-06-22 LAB
B-HCG UR QL: NEGATIVE
CTP QC/QA: YES
POCT GLUCOSE: 94 MG/DL (ref 70–110)

## 2018-06-22 PROCEDURE — 36000711: Performed by: OBSTETRICS & GYNECOLOGY

## 2018-06-22 PROCEDURE — 37000009 HC ANESTHESIA EA ADD 15 MINS: Performed by: OBSTETRICS & GYNECOLOGY

## 2018-06-22 PROCEDURE — 0TJB8ZZ INSPECTION OF BLADDER, VIA NATURAL OR ARTIFICIAL OPENING ENDOSCOPIC: ICD-10-PCS | Performed by: UROLOGY

## 2018-06-22 PROCEDURE — 63600175 PHARM REV CODE 636 W HCPCS: Performed by: NURSE ANESTHETIST, CERTIFIED REGISTERED

## 2018-06-22 PROCEDURE — 36000710: Performed by: OBSTETRICS & GYNECOLOGY

## 2018-06-22 PROCEDURE — 0TN60ZZ RELEASE RIGHT URETER, OPEN APPROACH: ICD-10-PCS | Performed by: UROLOGY

## 2018-06-22 PROCEDURE — 27201423 OPTIME MED/SURG SUP & DEVICES STERILE SUPPLY: Performed by: OBSTETRICS & GYNECOLOGY

## 2018-06-22 PROCEDURE — 58150 TOTAL HYSTERECTOMY: CPT | Mod: ,,, | Performed by: OBSTETRICS & GYNECOLOGY

## 2018-06-22 PROCEDURE — C1769 GUIDE WIRE: HCPCS | Performed by: OBSTETRICS & GYNECOLOGY

## 2018-06-22 PROCEDURE — 63600175 PHARM REV CODE 636 W HCPCS: Performed by: STUDENT IN AN ORGANIZED HEALTH CARE EDUCATION/TRAINING PROGRAM

## 2018-06-22 PROCEDURE — 81025 URINE PREGNANCY TEST: CPT | Performed by: SPECIALIST

## 2018-06-22 PROCEDURE — 88307 TISSUE EXAM BY PATHOLOGIST: CPT | Performed by: PATHOLOGY

## 2018-06-22 PROCEDURE — 27000190 HC CPAP FULL FACE MASK W/VALVE

## 2018-06-22 PROCEDURE — 25000003 PHARM REV CODE 250: Performed by: NURSE ANESTHETIST, CERTIFIED REGISTERED

## 2018-06-22 PROCEDURE — A4216 STERILE WATER/SALINE, 10 ML: HCPCS | Performed by: STUDENT IN AN ORGANIZED HEALTH CARE EDUCATION/TRAINING PROGRAM

## 2018-06-22 PROCEDURE — 71000039 HC RECOVERY, EACH ADD'L HOUR: Performed by: OBSTETRICS & GYNECOLOGY

## 2018-06-22 PROCEDURE — 11000001 HC ACUTE MED/SURG PRIVATE ROOM

## 2018-06-22 PROCEDURE — 27000221 HC OXYGEN, UP TO 24 HOURS

## 2018-06-22 PROCEDURE — 94660 CPAP INITIATION&MGMT: CPT

## 2018-06-22 PROCEDURE — 71000033 HC RECOVERY, INTIAL HOUR: Performed by: OBSTETRICS & GYNECOLOGY

## 2018-06-22 PROCEDURE — 25000003 PHARM REV CODE 250: Performed by: STUDENT IN AN ORGANIZED HEALTH CARE EDUCATION/TRAINING PROGRAM

## 2018-06-22 PROCEDURE — 94761 N-INVAS EAR/PLS OXIMETRY MLT: CPT

## 2018-06-22 PROCEDURE — 0UT90ZZ RESECTION OF UTERUS, OPEN APPROACH: ICD-10-PCS | Performed by: OBSTETRICS & GYNECOLOGY

## 2018-06-22 PROCEDURE — 63600175 PHARM REV CODE 636 W HCPCS: Performed by: OBSTETRICS & GYNECOLOGY

## 2018-06-22 PROCEDURE — 99900035 HC TECH TIME PER 15 MIN (STAT)

## 2018-06-22 PROCEDURE — 0UT20ZZ RESECTION OF BILATERAL OVARIES, OPEN APPROACH: ICD-10-PCS | Performed by: OBSTETRICS & GYNECOLOGY

## 2018-06-22 PROCEDURE — 63600175 PHARM REV CODE 636 W HCPCS: Performed by: ANESTHESIOLOGY

## 2018-06-22 PROCEDURE — 37000008 HC ANESTHESIA 1ST 15 MINUTES: Performed by: OBSTETRICS & GYNECOLOGY

## 2018-06-22 PROCEDURE — 88307 TISSUE EXAM BY PATHOLOGIST: CPT | Mod: 26,,, | Performed by: PATHOLOGY

## 2018-06-22 PROCEDURE — 25000003 PHARM REV CODE 250: Performed by: SPECIALIST

## 2018-06-22 PROCEDURE — 0UT70ZZ RESECTION OF BILATERAL FALLOPIAN TUBES, OPEN APPROACH: ICD-10-PCS | Performed by: OBSTETRICS & GYNECOLOGY

## 2018-06-22 RX ORDER — ROCURONIUM BROMIDE 10 MG/ML
INJECTION, SOLUTION INTRAVENOUS
Status: DISCONTINUED | OUTPATIENT
Start: 2018-06-22 | End: 2018-06-22

## 2018-06-22 RX ORDER — SODIUM CHLORIDE 0.9 % (FLUSH) 0.9 %
3 SYRINGE (ML) INJECTION EVERY 8 HOURS
Status: DISCONTINUED | OUTPATIENT
Start: 2018-06-22 | End: 2018-06-24 | Stop reason: HOSPADM

## 2018-06-22 RX ORDER — ONDANSETRON 2 MG/ML
INJECTION INTRAMUSCULAR; INTRAVENOUS
Status: DISCONTINUED | OUTPATIENT
Start: 2018-06-22 | End: 2018-06-22

## 2018-06-22 RX ORDER — AMOXICILLIN 250 MG
1 CAPSULE ORAL 2 TIMES DAILY
Status: DISCONTINUED | OUTPATIENT
Start: 2018-06-22 | End: 2018-06-24 | Stop reason: HOSPADM

## 2018-06-22 RX ORDER — HYDROMORPHONE HYDROCHLORIDE 2 MG/ML
0.4 INJECTION, SOLUTION INTRAMUSCULAR; INTRAVENOUS; SUBCUTANEOUS EVERY 5 MIN PRN
Status: DISCONTINUED | OUTPATIENT
Start: 2018-06-22 | End: 2018-06-22 | Stop reason: HOSPADM

## 2018-06-22 RX ORDER — OXYCODONE HYDROCHLORIDE 5 MG/1
5 TABLET ORAL ONCE AS NEEDED
Status: DISCONTINUED | OUTPATIENT
Start: 2018-06-22 | End: 2018-06-22 | Stop reason: HOSPADM

## 2018-06-22 RX ORDER — HYDROCODONE BITARTRATE AND ACETAMINOPHEN 10; 325 MG/1; MG/1
1 TABLET ORAL EVERY 4 HOURS PRN
Status: DISCONTINUED | OUTPATIENT
Start: 2018-06-23 | End: 2018-06-24 | Stop reason: HOSPADM

## 2018-06-22 RX ORDER — PROMETHAZINE HYDROCHLORIDE 25 MG/1
25 SUPPOSITORY RECTAL EVERY 6 HOURS PRN
Status: DISCONTINUED | OUTPATIENT
Start: 2018-06-22 | End: 2018-06-24 | Stop reason: HOSPADM

## 2018-06-22 RX ORDER — CEFAZOLIN SODIUM 2 G/50ML
2 SOLUTION INTRAVENOUS
Status: DISCONTINUED | OUTPATIENT
Start: 2018-06-22 | End: 2018-06-22 | Stop reason: HOSPADM

## 2018-06-22 RX ORDER — LIDOCAINE HYDROCHLORIDE 10 MG/ML
0.5 INJECTION, SOLUTION EPIDURAL; INFILTRATION; INTRACAUDAL; PERINEURAL ONCE
Status: DISCONTINUED | OUTPATIENT
Start: 2018-06-22 | End: 2018-06-22 | Stop reason: HOSPADM

## 2018-06-22 RX ORDER — SODIUM CHLORIDE, SODIUM LACTATE, POTASSIUM CHLORIDE, CALCIUM CHLORIDE 600; 310; 30; 20 MG/100ML; MG/100ML; MG/100ML; MG/100ML
INJECTION, SOLUTION INTRAVENOUS CONTINUOUS
Status: DISCONTINUED | OUTPATIENT
Start: 2018-06-22 | End: 2018-06-22

## 2018-06-22 RX ORDER — PANTOPRAZOLE SODIUM 40 MG/1
40 TABLET, DELAYED RELEASE ORAL DAILY
Status: DISCONTINUED | OUTPATIENT
Start: 2018-06-23 | End: 2018-06-24 | Stop reason: HOSPADM

## 2018-06-22 RX ORDER — SODIUM CHLORIDE 0.9 % (FLUSH) 0.9 %
3 SYRINGE (ML) INJECTION
Status: DISCONTINUED | OUTPATIENT
Start: 2018-06-22 | End: 2018-06-24 | Stop reason: HOSPADM

## 2018-06-22 RX ORDER — FENTANYL CITRATE 50 UG/ML
25 INJECTION, SOLUTION INTRAMUSCULAR; INTRAVENOUS EVERY 5 MIN PRN
Status: DISCONTINUED | OUTPATIENT
Start: 2018-06-22 | End: 2018-06-22 | Stop reason: HOSPADM

## 2018-06-22 RX ORDER — FENTANYL CITRATE 50 UG/ML
INJECTION, SOLUTION INTRAMUSCULAR; INTRAVENOUS
Status: DISCONTINUED | OUTPATIENT
Start: 2018-06-22 | End: 2018-06-22

## 2018-06-22 RX ORDER — MUPIROCIN 20 MG/G
1 OINTMENT TOPICAL 2 TIMES DAILY
Status: DISCONTINUED | OUTPATIENT
Start: 2018-06-22 | End: 2018-06-24 | Stop reason: HOSPADM

## 2018-06-22 RX ORDER — ONDANSETRON 8 MG/1
8 TABLET, ORALLY DISINTEGRATING ORAL EVERY 8 HOURS PRN
Status: DISCONTINUED | OUTPATIENT
Start: 2018-06-22 | End: 2018-06-24 | Stop reason: HOSPADM

## 2018-06-22 RX ORDER — MIDAZOLAM HYDROCHLORIDE 5 MG/ML
5 INJECTION INTRAMUSCULAR; INTRAVENOUS ONCE AS NEEDED
Status: DISCONTINUED | OUTPATIENT
Start: 2018-06-22 | End: 2018-06-22 | Stop reason: HOSPADM

## 2018-06-22 RX ORDER — ALBUTEROL SULFATE 90 UG/1
2 AEROSOL, METERED RESPIRATORY (INHALATION) EVERY 6 HOURS PRN
Status: DISCONTINUED | OUTPATIENT
Start: 2018-06-22 | End: 2018-06-24 | Stop reason: HOSPADM

## 2018-06-22 RX ORDER — HYDROMORPHONE HYDROCHLORIDE 2 MG/ML
1 INJECTION, SOLUTION INTRAMUSCULAR; INTRAVENOUS; SUBCUTANEOUS EVERY 4 HOURS PRN
Status: DISCONTINUED | OUTPATIENT
Start: 2018-06-22 | End: 2018-06-24 | Stop reason: HOSPADM

## 2018-06-22 RX ORDER — ACETAMINOPHEN 10 MG/ML
INJECTION, SOLUTION INTRAVENOUS
Status: DISCONTINUED | OUTPATIENT
Start: 2018-06-22 | End: 2018-06-22

## 2018-06-22 RX ORDER — DEXAMETHASONE SODIUM PHOSPHATE 4 MG/ML
INJECTION, SOLUTION INTRA-ARTICULAR; INTRALESIONAL; INTRAMUSCULAR; INTRAVENOUS; SOFT TISSUE
Status: DISCONTINUED | OUTPATIENT
Start: 2018-06-22 | End: 2018-06-22

## 2018-06-22 RX ORDER — IBUPROFEN 600 MG/1
600 TABLET ORAL EVERY 6 HOURS
Status: DISCONTINUED | OUTPATIENT
Start: 2018-06-23 | End: 2018-06-24 | Stop reason: HOSPADM

## 2018-06-22 RX ORDER — HETASTARCH 6 G/100ML
INJECTION, SOLUTION INTRAVENOUS CONTINUOUS PRN
Status: DISCONTINUED | OUTPATIENT
Start: 2018-06-22 | End: 2018-06-22

## 2018-06-22 RX ORDER — PROPOFOL 10 MG/ML
VIAL (ML) INTRAVENOUS
Status: DISCONTINUED | OUTPATIENT
Start: 2018-06-22 | End: 2018-06-22

## 2018-06-22 RX ORDER — HYDRALAZINE HYDROCHLORIDE 20 MG/ML
5 INJECTION INTRAMUSCULAR; INTRAVENOUS EVERY 6 HOURS PRN
Status: DISCONTINUED | OUTPATIENT
Start: 2018-06-22 | End: 2018-06-24 | Stop reason: HOSPADM

## 2018-06-22 RX ORDER — LIDOCAINE HCL/PF 100 MG/5ML
SYRINGE (ML) INTRAVENOUS
Status: DISCONTINUED | OUTPATIENT
Start: 2018-06-22 | End: 2018-06-22

## 2018-06-22 RX ORDER — HYDROCODONE BITARTRATE AND ACETAMINOPHEN 5; 325 MG/1; MG/1
1 TABLET ORAL EVERY 4 HOURS PRN
Status: DISCONTINUED | OUTPATIENT
Start: 2018-06-23 | End: 2018-06-24 | Stop reason: HOSPADM

## 2018-06-22 RX ORDER — METHYLENE BLUE 5 MG/ML
INJECTION INTRAVENOUS
Status: DISCONTINUED | OUTPATIENT
Start: 2018-06-22 | End: 2018-06-22

## 2018-06-22 RX ORDER — NEOSTIGMINE METHYLSULFATE 1 MG/ML
INJECTION, SOLUTION INTRAVENOUS
Status: DISCONTINUED | OUTPATIENT
Start: 2018-06-22 | End: 2018-06-22

## 2018-06-22 RX ORDER — ACETAMINOPHEN 10 MG/ML
1000 INJECTION, SOLUTION INTRAVENOUS EVERY 8 HOURS
Status: COMPLETED | OUTPATIENT
Start: 2018-06-22 | End: 2018-06-23

## 2018-06-22 RX ORDER — KETOROLAC TROMETHAMINE 30 MG/ML
15 INJECTION, SOLUTION INTRAMUSCULAR; INTRAVENOUS EVERY 6 HOURS
Status: COMPLETED | OUTPATIENT
Start: 2018-06-22 | End: 2018-06-23

## 2018-06-22 RX ORDER — GLYCOPYRROLATE 0.2 MG/ML
INJECTION INTRAMUSCULAR; INTRAVENOUS
Status: DISCONTINUED | OUTPATIENT
Start: 2018-06-22 | End: 2018-06-22

## 2018-06-22 RX ORDER — MIDAZOLAM HYDROCHLORIDE 1 MG/ML
INJECTION INTRAMUSCULAR; INTRAVENOUS
Status: DISCONTINUED | OUTPATIENT
Start: 2018-06-22 | End: 2018-06-22

## 2018-06-22 RX ORDER — DIPHENHYDRAMINE HCL 25 MG
25 CAPSULE ORAL EVERY 4 HOURS PRN
Status: DISCONTINUED | OUTPATIENT
Start: 2018-06-22 | End: 2018-06-24 | Stop reason: HOSPADM

## 2018-06-22 RX ORDER — PHENYLEPHRINE HYDROCHLORIDE 10 MG/ML
INJECTION INTRAVENOUS
Status: DISCONTINUED | OUTPATIENT
Start: 2018-06-22 | End: 2018-06-22

## 2018-06-22 RX ORDER — OXYCODONE HYDROCHLORIDE 5 MG/1
5 TABLET ORAL
Status: DISCONTINUED | OUTPATIENT
Start: 2018-06-22 | End: 2018-06-22 | Stop reason: HOSPADM

## 2018-06-22 RX ORDER — ONDANSETRON 2 MG/ML
4 INJECTION INTRAMUSCULAR; INTRAVENOUS DAILY PRN
Status: DISCONTINUED | OUTPATIENT
Start: 2018-06-22 | End: 2018-06-22 | Stop reason: HOSPADM

## 2018-06-22 RX ORDER — MEPERIDINE HYDROCHLORIDE 50 MG/ML
12.5 INJECTION INTRAMUSCULAR; INTRAVENOUS; SUBCUTANEOUS ONCE AS NEEDED
Status: DISCONTINUED | OUTPATIENT
Start: 2018-06-22 | End: 2018-06-22 | Stop reason: HOSPADM

## 2018-06-22 RX ADMIN — KETOROLAC TROMETHAMINE 15 MG: 30 INJECTION, SOLUTION INTRAMUSCULAR at 11:06

## 2018-06-22 RX ADMIN — SODIUM CHLORIDE, PRESERVATIVE FREE 3 ML: 5 INJECTION INTRAVENOUS at 09:06

## 2018-06-22 RX ADMIN — PHENYLEPHRINE HYDROCHLORIDE 100 MCG: 10 INJECTION INTRAVENOUS at 09:06

## 2018-06-22 RX ADMIN — CEFAZOLIN SODIUM 2 G: 2 SOLUTION INTRAVENOUS at 01:06

## 2018-06-22 RX ADMIN — GLYCOPYRROLATE 0.6 MG: 0.2 INJECTION, SOLUTION INTRAMUSCULAR; INTRAVENOUS at 01:06

## 2018-06-22 RX ADMIN — SODIUM CHLORIDE, SODIUM LACTATE, POTASSIUM CHLORIDE, AND CALCIUM CHLORIDE: 600; 310; 30; 20 INJECTION, SOLUTION INTRAVENOUS at 12:06

## 2018-06-22 RX ADMIN — CEFAZOLIN SODIUM 2 G: 2 SOLUTION INTRAVENOUS at 09:06

## 2018-06-22 RX ADMIN — FENTANYL CITRATE 50 MCG: 50 INJECTION, SOLUTION INTRAMUSCULAR; INTRAVENOUS at 01:06

## 2018-06-22 RX ADMIN — ROCURONIUM BROMIDE 20 MG: 10 INJECTION INTRAVENOUS at 09:06

## 2018-06-22 RX ADMIN — DEXAMETHASONE SODIUM PHOSPHATE 8 MG: 4 INJECTION, SOLUTION INTRAMUSCULAR; INTRAVENOUS at 09:06

## 2018-06-22 RX ADMIN — FENTANYL CITRATE 100 MCG: 50 INJECTION, SOLUTION INTRAMUSCULAR; INTRAVENOUS at 10:06

## 2018-06-22 RX ADMIN — ROCURONIUM BROMIDE 10 MG: 10 INJECTION INTRAVENOUS at 11:06

## 2018-06-22 RX ADMIN — PROPOFOL 50 MG: 10 INJECTION, EMULSION INTRAVENOUS at 09:06

## 2018-06-22 RX ADMIN — HETASTARCH IN SODIUM CHLORIDE: 6; .9 INJECTION, SOLUTION INTRAVENOUS at 11:06

## 2018-06-22 RX ADMIN — STANDARDIZED SENNA CONCENTRATE AND DOCUSATE SODIUM 1 TABLET: 8.6; 5 TABLET, FILM COATED ORAL at 09:06

## 2018-06-22 RX ADMIN — SODIUM CHLORIDE, SODIUM LACTATE, POTASSIUM CHLORIDE, AND CALCIUM CHLORIDE: 600; 310; 30; 20 INJECTION, SOLUTION INTRAVENOUS at 10:06

## 2018-06-22 RX ADMIN — ONDANSETRON 8 MG: 8 TABLET, ORALLY DISINTEGRATING ORAL at 06:06

## 2018-06-22 RX ADMIN — GLYCOPYRROLATE 0.2 MG: 0.2 INJECTION, SOLUTION INTRAMUSCULAR; INTRAVENOUS at 09:06

## 2018-06-22 RX ADMIN — NEOSTIGMINE METHYLSULFATE 4 MG: 1 INJECTION INTRAVENOUS at 01:06

## 2018-06-22 RX ADMIN — HYDROMORPHONE HYDROCHLORIDE 0.4 MG: 2 INJECTION INTRAMUSCULAR; INTRAVENOUS; SUBCUTANEOUS at 03:06

## 2018-06-22 RX ADMIN — PROPOFOL 150 MG: 10 INJECTION, EMULSION INTRAVENOUS at 09:06

## 2018-06-22 RX ADMIN — ACETAMINOPHEN 1000 MG: 10 INJECTION, SOLUTION INTRAVENOUS at 09:06

## 2018-06-22 RX ADMIN — LIDOCAINE HYDROCHLORIDE 75 MG: 20 INJECTION, SOLUTION INTRAVENOUS at 09:06

## 2018-06-22 RX ADMIN — METHYLENE BLUE 50 MG: 5 INJECTION INTRAVENOUS at 01:06

## 2018-06-22 RX ADMIN — KETOROLAC TROMETHAMINE 15 MG: 30 INJECTION, SOLUTION INTRAMUSCULAR at 06:06

## 2018-06-22 RX ADMIN — ROCURONIUM BROMIDE 30 MG: 10 INJECTION INTRAVENOUS at 09:06

## 2018-06-22 RX ADMIN — ROCURONIUM BROMIDE 10 MG: 10 INJECTION INTRAVENOUS at 12:06

## 2018-06-22 RX ADMIN — FENTANYL CITRATE 50 MCG: 50 INJECTION, SOLUTION INTRAMUSCULAR; INTRAVENOUS at 12:06

## 2018-06-22 RX ADMIN — CARBOXYMETHYLCELLULOSE SODIUM 2 DROP: 2.5 SOLUTION/ DROPS OPHTHALMIC at 09:06

## 2018-06-22 RX ADMIN — PHENYLEPHRINE HYDROCHLORIDE 100 MCG: 10 INJECTION INTRAVENOUS at 10:06

## 2018-06-22 RX ADMIN — FENTANYL CITRATE 150 MCG: 50 INJECTION, SOLUTION INTRAMUSCULAR; INTRAVENOUS at 09:06

## 2018-06-22 RX ADMIN — ONDANSETRON 4 MG: 2 INJECTION INTRAMUSCULAR; INTRAVENOUS at 01:06

## 2018-06-22 RX ADMIN — MIDAZOLAM HYDROCHLORIDE 2 MG: 1 INJECTION, SOLUTION INTRAMUSCULAR; INTRAVENOUS at 09:06

## 2018-06-22 RX ADMIN — FENTANYL CITRATE 50 MCG: 50 INJECTION, SOLUTION INTRAMUSCULAR; INTRAVENOUS at 11:06

## 2018-06-22 RX ADMIN — SODIUM CHLORIDE, SODIUM LACTATE, POTASSIUM CHLORIDE, AND CALCIUM CHLORIDE: 600; 310; 30; 20 INJECTION, SOLUTION INTRAVENOUS at 08:06

## 2018-06-22 RX ADMIN — MUPIROCIN 1 G: 20 OINTMENT TOPICAL at 09:06

## 2018-06-22 NOTE — OP NOTE
DATE OF PROCEDURE:  06/22/2018.    SURGEON:  Best Jefferson M.D.    PROCEDURES:  1.  Right ureterolysis.  2.  Cystoscopy.    PREOPERATIVE DIAGNOSIS:  Rule out right ureteral injury.    POSTOPERATIVE DIAGNOSIS:  No evidence of ureteral injury.    HISTORY:  Ms. Carrero is a 57-year-old woman who was undergoing a total   laparoscopic hysterectomy, which has been converted to open.  This being done   for benign GYN disease.  I was called as an intraoperative consult to rule out   right ureteral injury.  After removing the majority of the uterus, a   tubular-like structure with a clearly defined lumen was visible on the right   side of the vaginal cuff.  Dr. Bradley was concerned for right ureteral injury   and asked me to come to evaluate this.  I scrubbed and draped.  When I entered   the room, the patient was in lithotomy position.  The hysterectomy had been   completed.  A self-retaining retractor in place and Dr. Bradley's team was   closing the vaginal cuff.  After the vaginal cuff closure was completed, we   irrigated the pelvis.  As described by Dr. Bradley on the right side of the   vaginal cuff, there was a tubular structure with the lumen.  I opened the   peritoneum in the right side of the pelvis and we were able to identify the   ureter.  A gentle dissection of the ureter was performed with blunt finger   dissection and with right angles.  A vessel loop was passed around the ureter   just below the bifurcation of the iliac vessels on the right side.  The ureter   was traced to its insertion in the bladder.  A vessel loop was also placed on   the ureter at its insertion into the bladder.  Care was taken not to   devascularize the ureter.  The circumference of the ureter was not dissected in   its complete length, but rather in the two areas described above.  After   performing this, it was clear that the structure entering the cervix in question   was not the ureter.  Cystoscopy was then performed with 30  and 70 degree   lenses.  Both ureteral orifices are in the anticipated locations and there is   brisk clear efflux from both ureteral orifices.  There were no foreign bodies in   the bladder and no evidence of bladder injury.  There is some mild ecchymosis   of the bladder mucosa at the bladder base consistent with retraction.  There is   no evidence of urinary tract injury identified.  The bladder is drained after   the cystoscope is removed.  A Herrera catheter was replaced.  Dr. Bradley's team   performed the remainder of the procedure.  I will sign off and I have asked him   to reconsult me as needed.      KYLAH/ANTONIO  dd: 06/22/2018 13:38:33 (CDT)  td: 06/22/2018 18:35:12 (CDT)  Doc ID   #[id number]  Job ID #[id number]    CC:

## 2018-06-22 NOTE — OP NOTE
Dictation #2  MRN:[id number]  CSN:[id number]  Report # [id number]    Carla Echeverria M.D.  PGY-3 Obn  [phone]

## 2018-06-22 NOTE — PLAN OF CARE
"5:38 PM   BP (!) 154/85 (BP Location: Right arm, Patient Position: Lying)   Pulse 96   Temp 98.6 °F (37 °C) (Oral)   Resp 16   Ht 5' 8" (1.727 m)   Wt 82.6 kg (182 lb)   LMP 05/01/2018 (Approximate)   SpO2 97%   Breastfeeding? No   BMI 27.67 kg/m²     Received to room from [facility] PACU, RN w/ family at side. HTN on 2L/O2/NC & afebrile.  Ox4, still slightly sedated--arousable to voice.  Steri strips noted not in place, dried drainage marked on gown-paged to notify, c urometer secured. HOB locked >30. Bowel sounds active, clear liquid diet to be initiated once alert.  Will administer anti-emetic asap.  Oriented to room, POC, & all equipment. Fall precautions implemented & call light in reach. Resting comfortably in low bed, in NAD.       6:39 PM   Placed steri strips to the umbilicus lap site.  Moist drainage marked on gown. Respiratory notified for cpap HS.    7:15 PM  To locate home CPAP mask, ACU not reachable per telephone.    "

## 2018-06-22 NOTE — ANESTHESIA POSTPROCEDURE EVALUATION
"Anesthesia Post Evaluation    Patient: Leschia T Bastian    Procedure(s) Performed: Procedure(s) (LRB):  HYSTERECTOMY, TOTAL, LAPAROSCOPIC, ATTEMPTED (N/A)  HYSTERECTOMY, TOTAL, ABDOMINAL, WITH BILATERAL SALPINGO-OOPHORECTOMY (Bilateral)  CYSTOSCOPY  URETEROLYSIS (Right)    Final Anesthesia Type: general  Patient location during evaluation: PACU  Patient participation: Yes- Able to Participate  Level of consciousness: awake and alert  Post-procedure vital signs: reviewed and stable  Pain management: adequate  Airway patency: patent  PONV status at discharge: No PONV  Anesthetic complications: no      Cardiovascular status: hemodynamically stable  Respiratory status: unassisted  Hydration status: euvolemic  Follow-up not needed.        Visit Vitals  /70   Pulse 94   Temp 36.8 °C (98.3 °F) (Oral)   Resp 18   Ht 5' 8" (1.727 m)   Wt 82.6 kg (182 lb)   LMP 05/01/2018 (Approximate)   SpO2 96%   Breastfeeding? No   BMI 27.67 kg/m²       Pain/Devyn Score: Pain Assessment Performed: Yes (6/22/2018  4:30 PM)  Presence of Pain: non-verbal indicators absent (6/22/2018  4:30 PM)  Pain Rating Prior to Med Admin: 6 ( 2. pt snoring when left undisturbed) (6/22/2018  3:40 PM)  Pain Rating Post Med Admin: 0 (6/22/2018  3:55 PM)  Devyn Score: 9 (6/22/2018  4:30 PM)      "

## 2018-06-22 NOTE — TRANSFER OF CARE
"Anesthesia Transfer of Care Note    Patient: Leschia T Bastian    Procedure(s) Performed: Procedure(s) (LRB):  HYSTERECTOMY, TOTAL, LAPAROSCOPIC, ATTEMPTED (N/A)  HYSTERECTOMY, TOTAL, ABDOMINAL, WITH BILATERAL SALPINGO-OOPHORECTOMY (Bilateral)  CYSTOSCOPY  URETEROLYSIS (Right)    Patient location: PACU    Anesthesia Type: general    Transport from OR: Transported from OR on 2-3 L/min O2 by NC with adequate spontaneous ventilation    Post pain: adequate analgesia    Post assessment: no apparent anesthetic complications    Post vital signs: stable    Level of consciousness: awake, alert and oriented    Nausea/Vomiting: no nausea/vomiting    Complications: none    Transfer of care protocol was followed      Last vitals:   Visit Vitals  /74 (BP Location: Right arm, Patient Position: Lying)   Pulse 74   Temp 36.6 °C (97.9 °F) (Oral)   Resp 16   Ht 5' 8" (1.727 m)   Wt 82.6 kg (182 lb)   LMP 05/01/2018 (Approximate)   SpO2 98%   Breastfeeding? No   BMI 27.67 kg/m²     "

## 2018-06-22 NOTE — INTERVAL H&P NOTE
The patient has been examined and the H&P has been reviewed:    I concur with the findings and no changes have occurred since H&P was written.    Anesthesia/Surgery risks, benefits and alternative options discussed and understood by patient/family.          Active Hospital Problems    Diagnosis  POA    Endometrial hyperplasia [N85.00]  Yes      Resolved Hospital Problems    Diagnosis Date Resolved POA   No resolved problems to display.

## 2018-06-22 NOTE — BRIEF OP NOTE
[facility]  Brief Operative Note    SUMMARY     Surgery Date: 6/22/2018     Surgeon(s) and Role:  Panel 1:     * Nidia Bradley MD - Primary     * Carla Echeverria MD - Resident - Assisting     * Best Jefferson MD - Assisting    Panel 2:     * Best Jefferson MD - Primary    Pre-op Diagnosis:  Pelvic pain [R10.2]  Endometrial hyperplasia without atypia, simple [N85.01]    Post-op Diagnosis:  Post-Op Diagnosis Codes:     * Pelvic pain [R10.2]     * Endometrial hyperplasia without atypia, simple [N85.01]    Procedure(s) (LRB):  HYSTERECTOMY, TOTAL, LAPAROSCOPIC, ATTEMPTED (N/A)  HYSTERECTOMY, TOTAL, ABDOMINAL, WITH BILATERAL SALPINGO-OOPHORECTOMY (Bilateral)  CYSTOSCOPY  URETEROLYSIS (Right)    Anesthesia: General    Description of the findings of the procedure:   1. Normal appearing external female genitalia, no adnexal masses palpated on bimanual exam  2. Uterus sounds to 9cm  3. Uterine outline normal aside from posterior fundal pedunculated fibroid  4. Evidence of prior tubal ligation  5. Normal appearing bilateral ovaries, however slightly displaced caudally with mild adhesion to posterior mesosalpinx  6. Elongated uterine cervix  7. Normal liver edge  8. Bilateral ureters noted to be vermiculating during laparoscopy  9. Bilateral efflux of UO's noted on cysto -no evidence of ureteral injury noted by urology consult (please see Dr. Jefferson' operative report for details)    Estimated Blood Loss: 300 mL    IVF: 500 colloid, 2800 crystalloids    UOP: 400cc     Complications: conversion to open for elongated cervix not amenable to uterine manipulator         Specimens:   Specimen (12h ago through future)    Start     Ordered    06/22/18 1349  Specimen to Pathology - Surgery  Once     Comments:  1. Uterus, cervix, bilateral fallopian tubes and ovaries      06/22/18 1349          Carla Echeverria M.D.  PGY-3 ObGyn  [phone]

## 2018-06-22 NOTE — H&P (VIEW-ONLY)
"HISTORY OF PRESENT ILLNESS:    Leschia T Bastian is a 57 y.o. female, , Patient's last menstrual period was 2018.,  presents for a pre-op exam for TLH/BSO scheduled on 2018. Patient with focal simple endometrial hyperplasia without atypia on EMBX performed for irregular bleeding. Patient has a history of spotting intermittently throughout the months. Last "normal" cycle 10/2017. Patient also reports midline pelvic pain. Medical vs. surgical management discussed in detail for treatment of endometrial hyperplasia. Patient very concerned secondary to her family history of cancer and she desires to proceed with definitive surgery with hysterectomy. Risks and benefits of procedure discussed in detail. A discussion was also held in reference to ovarian hormone production, a woman's lifetime risk of developing ovarian cancer and the possible need for reoperation for ovarian pathology. She desires to have ovaries removed at the time of surgery.    Long discussion held patient today concerning her surgery, hospital course on the day of surgery and post operative course. Precautions after surgery discussed in detail.  Risks, alternatives and benefits of surgery discussed with patient in detail and consent explained in detail. Questions were answered and patient voices understanding. Plan pre-op with Anesthesia at [facility].    Narrative     Ultrasound pelvis complete with transvaginal.    Findings: Transabdominal and transvaginal imaging was acquired.    The uterus measures 10 x 5.8 x 6.2 cm and contains multiple fibroids the largest of which measuring 2.6 cm within the posterior wall. The endometrial stripe measures 1.2 cm. The right and left ovaries measure 2.1 x 2.1 x 1.3 cm and 1.8 x 1.1 x 2.1 cm respectively. The ovaries have a normal appearance. There are several large nabothian cysts.   Impression      Leiomyoma.       ORDERING PHYSICIAN(S)  DONNA WILLIAMSON  PreOperative Diagnosis  1. " Menorrhagia.  2. Cervix.  PostOperative Diagnosis  cp  SPECIMEN  1) Endometrial biopsy.  2) Cervix.  FINAL PATHOLOGIC DIAGNOSIS  1. ENDOMETRIUM, BIOPSY:  -Focal simple hyperplasia without atypia in a background of inactive endometrium with breakdown.  2. CERVIX, BIOPSY:  -Ulcerated inflamed granulation tissue.  -Negative for malignancy.  Part 1 was also reviewed by Dr. Frias who agrees with the diagnosis.    Past Medical History:   Diagnosis Date    Atypical chest pain     Cervical radiculopathy     GERD (gastroesophageal reflux disease)     Hypertension     Lumbar radiculopathy     JAYLON on CPAP     Seasonal allergies     Shortness of breath        Past Surgical History:   Procedure Laterality Date    AXILLARY HIDRADENITIS EXCISION Bilateral     BUNIONECTOMY      GALLBLADDER SURGERY      TUBAL LIGATION         MEDICATIONS AND ALLERGIES:      Current Outpatient Prescriptions:     albuterol 90 mcg/actuation inhaler, Inhale 2 puffs into the lungs every 6 (six) hours as needed for Wheezing. Rescue, Disp: 18 g, Rfl: 1    ALLER-EASE 180 mg tablet, TAKE 1 TABLET BY MOUTH ONCE A DAY, Disp: 30 tablet, Rfl: 0    amLODIPine (NORVASC) 5 MG tablet, TAKE ONE TABLET BY MOUTH EVERY DAY (STOP LOSARTAN), Disp: 30 tablet, Rfl: 2    aspirin-acetaminophen-caffeine 250-250-65 mg (EXCEDRIN MIGRAINE) 250-250-65 mg per tablet, Take 1 tablet by mouth every 6 (six) hours as needed for Pain., Disp: , Rfl:     clindamycin phosphate 1% (CLINDAGEL) 1 % gel, , Disp: , Rfl:     EPINEPHrine (EPIPEN) 0.3 mg/0.3 mL AtIn, one injection, Disp: , Rfl:     hydroCHLOROthiazide (MICROZIDE) 12.5 mg capsule, Take 1 capsule (12.5 mg total) by mouth once daily., Disp: 90 capsule, Rfl: 3    hydrocortisone 2.5 % cream, , Disp: , Rfl:     ketoconazole (NIZORAL) 2 % cream, , Disp: , Rfl:     ketoconazole (NIZORAL) 2 % shampoo, , Disp: , Rfl:     loratadine (CLARITIN) 10 mg tablet, once daily. , Disp: , Rfl:     pantoprazole (PROTONIX) 40 MG  tablet, Take 1 tablet (40 mg total) by mouth once daily. Reduced to one a day after a month of twice a day, Disp: 30 tablet, Rfl: 4    ranitidine (ZANTAC) 150 MG tablet, Take 150 mg by mouth once daily., Disp: , Rfl:     triamcinolone acetonide 0.1% (KENALOG) 0.1 % cream, , Disp: , Rfl:     Review of patient's allergies indicates:   Allergen Reactions    Clams      Allergy testing confirmed    Codeine Itching    Scallops     Shrimp      Peeling shrimp, got a rash, allergy test confirmed    Crab Rash     Throat closed       Family History   Problem Relation Age of Onset    Breast cancer Mother 53    Cancer Mother         breast cancer    Hypertension Mother     Ovarian cancer Cousin     Cancer Cousin         ovarian    Hypertension Sister     Breast cancer Sister 52    Glaucoma Father     Breast cancer Maternal Cousin     Breast cancer Maternal Cousin     Heart disease Maternal Grandfather     Heart disease Paternal Grandfather     Colon cancer Neg Hx        Social History     Social History    Marital status: Legally      Spouse name: N/A    Number of children: N/A    Years of education: N/A     Occupational History    Not on file.     Social History Main Topics    Smoking status: Never Smoker    Smokeless tobacco: Never Used    Alcohol use Yes      Comment: socially    Drug use: No    Sexual activity: Not Currently     Partners: Male     Other Topics Concern    Not on file     Social History Narrative    No narrative on file       COMPREHENSIVE GYN HISTORY:  PAP History: Denies abnormal Paps.  Infection History: Denies STDs. Denies PID.  Benign History: Denies uterine fibroids. Denies ovarian cysts. Denies endometriosis. Denies other conditions.  Cancer History: Denies cervical cancer. Denies uterine cancer or hyperplasia. Denies ovarian cancer. Denies vulvar cancer or pre-cancer. Denies vaginal cancer or pre-cancer. Denies breast cancer. Denies colon cancer.  Sexual Activity  "History: Reports currently being sexually active  Menstrual History: Monthly. Mod then light flow.   Dysmenorrhea History: Reports mild dysmenorrhea.     ROS:  GENERAL: No weight changes. No swelling. No fatigue. No fever.  CARDIOVASCULAR: No chest pain. No shortness of breath. No leg cramps.   NEUROLOGICAL: No headaches. No vision changes.  BREASTS: No pain. No lumps. No discharge.  ABDOMEN: No pain. No nausea. No vomiting. No diarrhea. No constipation.  REPRODUCTIVE: No abnormal bleeding.   VULVA: No pain. No lesions. No itching.  VAGINA: No relaxation. No itching. No odor. No discharge. No lesions.  URINARY: No incontinence. No nocturia. No frequency. No dysuria.    BP (!) 132/90   Ht 5' 8" (1.727 m)   Wt 83 kg (182 lb 15.7 oz)   LMP 05/01/2018   BMI 27.82 kg/m²     PE:  APPEARANCE: Well nourished, well developed, in no acute distress.  AFFECT: WNL, alert and oriented x 3.  SKIN: No acne or hirsutism.  NECK: Neck symmetric, without masses or thyromegaly.  NODES: No inguinal, cervical, axillary or femoral lymph node enlargement.  CHEST: Good respiratory effort.   ABDOMEN: Soft. No tenderness or masses. No hepatosplenomegaly. No hernias.  BREASTS: Symmetrical, no skin changes, visible lesions, palpable masses or nipple discharge bilaterally.  PELVIC: External female genitalia without lesions.  Female hair distribution. Adequate perineal body, Normal urethral meatus. Vagina moist and well rugated without lesions or discharge.  No significant cystocele or rectocele present. Cervix pink without lesions, discharge or tenderness. Uterus is 8-10 week size, regular, mobile and nontender. Adnexa without masses or tenderness.  EXTREMITIES: No edema    DIAGNOSIS:  1. Anxiety    2. Essential hypertension    3. Mild vitamin D deficiency    4. Gastroesophageal reflux disease without esophagitis    5. Obstructive sleep apnea treated with continuous positive airway pressure (CPAP)    6. Endometrial hyperplasia    7. Uterine " leiomyoma, unspecified location      PLAN:    Orders Placed This Encounter    Vaginosis Screen by DNA Probe     LABS AND TESTS ORDERED:  Pre-op orders and T&S ordered    COUNSELING:  Post op counseling performed, questions answered    Plan TLNNEKA/ANDRA on 6/22/2018.     FOLLOW-UP with me for post op visit.

## 2018-06-23 LAB
BASOPHILS # BLD AUTO: 0 K/UL
BASOPHILS NFR BLD: 0 %
DIFFERENTIAL METHOD: ABNORMAL
EOSINOPHIL # BLD AUTO: 0 K/UL
EOSINOPHIL NFR BLD: 0 %
ERYTHROCYTE [DISTWIDTH] IN BLOOD BY AUTOMATED COUNT: 14.2 %
HCT VFR BLD AUTO: 34.6 %
HGB BLD-MCNC: 10.6 G/DL
LYMPHOCYTES # BLD AUTO: 1 K/UL
LYMPHOCYTES NFR BLD: 13.7 %
MCH RBC QN AUTO: 23.3 PG
MCHC RBC AUTO-ENTMCNC: 30.6 G/DL
MCV RBC AUTO: 76 FL
MONOCYTES # BLD AUTO: 0.7 K/UL
MONOCYTES NFR BLD: 9.3 %
NEUTROPHILS # BLD AUTO: 5.7 K/UL
NEUTROPHILS NFR BLD: 76.9 %
PLATELET # BLD AUTO: 189 K/UL
PMV BLD AUTO: 9.8 FL
RBC # BLD AUTO: 4.55 M/UL
WBC # BLD AUTO: 7.42 K/UL

## 2018-06-23 PROCEDURE — 25000003 PHARM REV CODE 250: Performed by: STUDENT IN AN ORGANIZED HEALTH CARE EDUCATION/TRAINING PROGRAM

## 2018-06-23 PROCEDURE — 11000001 HC ACUTE MED/SURG PRIVATE ROOM

## 2018-06-23 PROCEDURE — 36415 COLL VENOUS BLD VENIPUNCTURE: CPT

## 2018-06-23 PROCEDURE — 94799 UNLISTED PULMONARY SVC/PX: CPT

## 2018-06-23 PROCEDURE — 99024 POSTOP FOLLOW-UP VISIT: CPT | Mod: ,,, | Performed by: OBSTETRICS & GYNECOLOGY

## 2018-06-23 PROCEDURE — A4216 STERILE WATER/SALINE, 10 ML: HCPCS | Performed by: STUDENT IN AN ORGANIZED HEALTH CARE EDUCATION/TRAINING PROGRAM

## 2018-06-23 PROCEDURE — 94761 N-INVAS EAR/PLS OXIMETRY MLT: CPT

## 2018-06-23 PROCEDURE — 85025 COMPLETE CBC W/AUTO DIFF WBC: CPT

## 2018-06-23 PROCEDURE — 99900035 HC TECH TIME PER 15 MIN (STAT)

## 2018-06-23 PROCEDURE — 63600175 PHARM REV CODE 636 W HCPCS: Performed by: STUDENT IN AN ORGANIZED HEALTH CARE EDUCATION/TRAINING PROGRAM

## 2018-06-23 RX ORDER — BUTALBITAL, ACETAMINOPHEN AND CAFFEINE 50; 325; 40 MG/1; MG/1; MG/1
1 TABLET ORAL ONCE
Status: COMPLETED | OUTPATIENT
Start: 2018-06-23 | End: 2018-06-23

## 2018-06-23 RX ADMIN — ACETAMINOPHEN 1000 MG: 10 INJECTION, SOLUTION INTRAVENOUS at 05:06

## 2018-06-23 RX ADMIN — STANDARDIZED SENNA CONCENTRATE AND DOCUSATE SODIUM 1 TABLET: 8.6; 5 TABLET, FILM COATED ORAL at 08:06

## 2018-06-23 RX ADMIN — BUTALBITAL, ACETAMINOPHEN AND CAFFEINE 1 TABLET: 50; 325; 40 TABLET ORAL at 05:06

## 2018-06-23 RX ADMIN — KETOROLAC TROMETHAMINE 15 MG: 30 INJECTION, SOLUTION INTRAMUSCULAR at 12:06

## 2018-06-23 RX ADMIN — PANTOPRAZOLE SODIUM 40 MG: 40 TABLET, DELAYED RELEASE ORAL at 09:06

## 2018-06-23 RX ADMIN — KETOROLAC TROMETHAMINE 15 MG: 30 INJECTION, SOLUTION INTRAMUSCULAR at 05:06

## 2018-06-23 RX ADMIN — MUPIROCIN 1 G: 20 OINTMENT TOPICAL at 09:06

## 2018-06-23 RX ADMIN — STANDARDIZED SENNA CONCENTRATE AND DOCUSATE SODIUM 1 TABLET: 8.6; 5 TABLET, FILM COATED ORAL at 09:06

## 2018-06-23 RX ADMIN — IBUPROFEN 600 MG: 600 TABLET, FILM COATED ORAL at 05:06

## 2018-06-23 RX ADMIN — SODIUM CHLORIDE, PRESERVATIVE FREE 3 ML: 5 INJECTION INTRAVENOUS at 08:06

## 2018-06-23 RX ADMIN — SODIUM CHLORIDE, PRESERVATIVE FREE 3 ML: 5 INJECTION INTRAVENOUS at 01:06

## 2018-06-23 RX ADMIN — SODIUM CHLORIDE, PRESERVATIVE FREE 3 ML: 5 INJECTION INTRAVENOUS at 05:06

## 2018-06-23 RX ADMIN — ACETAMINOPHEN 1000 MG: 10 INJECTION, SOLUTION INTRAVENOUS at 04:06

## 2018-06-23 RX ADMIN — HYDROCODONE BITARTRATE AND ACETAMINOPHEN 1 TABLET: 5; 325 TABLET ORAL at 08:06

## 2018-06-23 RX ADMIN — MUPIROCIN 1 G: 20 OINTMENT TOPICAL at 08:06

## 2018-06-23 NOTE — PLAN OF CARE
10:09 AM  Seeking verification/clarification of Fernandez, removal per Urology/Gyn.     11:15 AM  From MD Ronny-Urology, states ok to Discontinue Fernandez cath    12:15  Passive voiding trial began fernandez dc c 375 dark brown UO      3:20 PM  OOB Up in chair.  VSS on RA and afebrile this shift. Transverse abdominal incision CDI.  Tolerating pain per MAR.  Good appetite and passive voiding trial continues, BRP.  Ambulating around halls independently, >400 feet.  IS demonstrated.   Free from injury or skin breakdown; Fall precautions maintained and call light in reach.  POC updated questions answered and comments acknowledged.  Purposeful hourly rounding completed this shift.    4:16 PM   75cc UO dark green    6:21 PM    cc, paged to update MD Gricelda

## 2018-06-23 NOTE — OP NOTE
DATE OF PROCEDURE:  06/22/2018.    SURGEON:  Nidia Bradley MD    ASSISTANT:  Carla Echeverria M.D. Resident    INTRAOPERATIVE CONSULT ATTENDING:  Best Jefferson M.D.    PREOPERATIVE DIAGNOSES:  1.  Endometrial hyperplasia.  2.  Pelvic pain.    POSTOPERATIVE DIAGNOSES:  1.  Endometrial hyperplasia.  2.  Pelvic pain.    OPERATIVE PROCEDURE PERFORMED:  Attempted total laparoscopic hysterectomy   converted to total abdominal hysterectomy with bilateral salpingo-oophorectomy,   right-sided uterolysis, and cystoscopy.    ANESTHESIA:  General.    DESCRIPTION OF FINDINGS:  1.  Normal-appearing external female genitalia.  2.  No adnexal masses palpated on bimanual exam.  3.  Uterus sounded to 9 cm.  4.  Uterine outline normal aside from a posterior fundal pedunculated fibroid.  5.  Evidence of prior tubal ligation. Evidence of pelvic congestion syndrome.  6.  Normal appearing bilateral ovaries; however, slightly displaced caudally   with mild adhesions in the posterior mesosalpinx.  7.  Elongated uterine cervix.  8.  Normal liver edge.  9.  Bilateral ureters noted to be vermiculating during laparoscopy.  10.  Bilateral efflux of UOs noted on cystoscopy, no evidence of ureteral injury   noted by Urology consult (please see Dr. Jefferson' operative report for   details).    ESTIMATED BLOOD LOSS:  30 mL    IV FLUIDS:  500 mL of colloid.  2800 of crystalloid.    URINE OUTPUT:  400 mL    COMPLICATIONS:  Conversion to open hysterectomy for elongated cervix that is not   amenable to uterine manipulation.    SPECIMENS TO PATHOLOGY:  uterus, cervix, bilateral fallopian tubes and   Ovaries sent as one specimen.    PROCEDURE IN DETAIL:  The patient was seen preoperatively.  Consents were   reviewed, signed and placed in her chart.  She was brought to the OR where   anesthesia was found to be adequate.  She was placed in the dorsal lithotomy   position with bilateral lower extremities in Yellofin stirrups.  She was prepped   and  draped in a sterile fashion.  A timeout was performed. Herrera was placed. Right angle   retractors were placed into the vaginal vault until the cervix was in view.  A   single-tooth tenaculum was used to grasp the anterior lip of the cervix and the   uterus sounded to 9 cm.  Stay sutures were placed in the anterior and posterior   lips of the cervix.  A KRZYSZTOF device was assembled using a 3.5 cm   size KOH and a 8 cm KRZYSZTOF tip.  The KRZYSZTOF device was introduced into the uterine   cavity.   Gloves were changed and attention was brought to the   abdomen.  A 10 mm incision was made infraumbilically.  Veress needle was placed and   intraperitoneal placement was confirmed with a water drop test.  CO2 insufflation   was progressed to 15 mmHg and bilateral lower abdominal 5-mm trocars were placed   under direct visualization.  Abdominal cavity assessment was performed noting a   normal liver and the findings seen above.  [place] device was used to cauterize   and ligate through the round ligament on the right side, this was progressed   down the broad ligament to the vesicouterine peritoneum to create a bladder flap   anteriorly.  The Everest device was then used to cauterize and ligate through   the IP ligament on the right side and the uterine vessels were skeletonized and   cauterized using the Everest.  The same was performed on the left hand side.  An   anterior colpotomy was attempted after the uterine arteries were cauterized   bilaterally; however, the KOH was not reached after appropriate dissection.   At this time, it was found that the uterine manipulator stopped working -it would not antevert the uterus any longer.  Attention was brought back down to the perineum where the KRZYSZTOF device was   removed from the uterine cavity and noted to be defective.  Another KRZYSZTOF device   was assembled and placed into the uterine cavity and found to be working   properly.  Gloves were changed.  Attention was brought back to the  laparoscopy   where a posterior colpotomy was attempted.  Again, somewhat distorted anatomy proved   unable to find the KOH manipulator of the KRZYSZTOF.  Therefore, attention was   brought back down to the perineum. Even with proper placement, the KOH manipulator would not remain in the proper position and the vaginal tissue was not able to be flush with the KRZYSZTOF device due to elongated cervix.  In order to safely proceed and complete the procedure, a decision was made at that time to perform an open hysterectomy.  The KRZYSZTOF device was removed from the uterus.  Gloves were again changed and attention was   brought to the abdomen where a Pfannenstiel incision was made and carried down   to the fascia.  Fascia was extended bilaterally using curved Cristobal scissors and    from the rectus muscle.  The peritoneum was entered bluntly and wet   laps were used to pack the bowel out of the way.  The O'Steven-O'Guevara   retractor was placed into the abdomen.  Melly clamps were placed bilaterally at   the cornua of the uterus to elevate the uterus out of the abdomen. Attention was turned the the uterus and curved   clamps were used to further ligate the uterine vessels. Near the area of ligation a lumen was noted and suspected to be the uterine vein from previous laparoscopic ligation. However, the location was noted to be near the ureter and a decision was made to consult Dr. Best Jefferson with Urology for ureterolysis.  The uterine artery was doubly clamped and suture ligated bilaterally.  Straight   clamps were then used to proceed down the cardinal ligament to the level of the   cervix where curved Alayna clamps were used to dissect the cervix off of the   vaginal tissue.  Alayna stitch was placed bilaterally and the vaginal cuff was   closed with figure-of-eight sutures of 0 Vicryl.  The specimen was passed off   for pathological review. The ureterolysis and cystoscopy was performed by Dr. Jefferson, please see separate  dictation, and both appeared normal. The fernandez cathter replaced.  Irrigation was performed and hemostasis was appreciated.  All instruments and laps were removed from the abdomen and after assuring that counts were correct the abdominal closure was begun. The peritoneum was closed with running sutures of 2-0  Vicryl.  The fascia was closed with running sutures of #1 PDS starting at the angles and tying at the midline.  Subcutaneous tissue was closed with running suture of plain gut and the skin was closed with 4-0   Monocryl at the Pfannenstiel incisions and at the trocar sites.  Needle, lap and  sponge counts were correct x 2.  Please note that antibiotics were redosed at 3   hours into the procedure.  The patient tolerated the procedure well and was   brought to Recovery in stable condition.    DICTATED BY:  MD ROBERT Lao/HN  dd: 06/22/2018 18:50:33 (CDT)  td: 06/22/2018 22:42:06 (CDT)  Doc ID   #[id number]  Job ID #[id number]    CC:     I was present for and participated in the entire procedure.   I concur with the above resident's findings and assessments.   Nidia Bradley M.D.  Obstetrics and Gynecology

## 2018-06-23 NOTE — PLAN OF CARE
Problem: Patient Care Overview  Goal: Plan of Care Review  Outcome: Ongoing (interventions implemented as appropriate)  Pt resting comfortably on CPAP, unable to perform IS because pt sleeping. PRN tx not required.

## 2018-06-23 NOTE — PLAN OF CARE
Problem: Patient Care Overview  Goal: Plan of Care Review  Outcome: Ongoing (interventions implemented as appropriate)  Patient remains free from injury or falls. Vital signs stable throughout night on room air- home CPAP. Positions self independently. Herrera to gravity. Pain managed with IV medications. Lap sites x3 CDI, transverse CDI. Daughter at bedside. Bed in low locked position and call light within reach. Will continue to monitor.

## 2018-06-23 NOTE — PROGRESS NOTES
Progress Note  Gynecology    Admit Date: 2018  LOS: 1    Reason for Admission:  S/P ROLDAN (total abdominal hysterectomy)    SUBJECTIVE:     Leschia T Bastian is a 57 y.o.  who is POD#1 s/p TLH converted to ROLDAN/BSO with right-sided ureterolysis and cysto.  NAEON. Pt doing well this morning. Pain is well controlled. She is tolerating a liquid diet without N/V. not yet ambulating. Voiding via fernandez. Passing flatus. Only complaint this morning is of headache.    OBJECTIVE:     Vital Signs   Temp:  [97.5 °F (36.4 °C)-99.4 °F (37.4 °C)] 98.5 °F (36.9 °C)  Pulse:  [74-96] 77  Resp:  [14-19] 19  SpO2:  [93 %-100 %] 98 %  BP: (115-154)/(66-86) 115/66      Intake/Output Summary (Last 24 hours) at 18 0527  Last data filed at 18 0400   Gross per 24 hour   Intake             3332 ml   Output             1375 ml   Net             1957 ml       Physical Exam:  Gen: A&Ox3, NAD  CV: RRR  Pulm: LCTAB, normal respiratory effort  Abd: hypoactive bowel sounds, soft, non-distended, non-tender to palpation without rebound or guarding  Inc: c/d/i with steri strips over trochar sites and island dressing over pfannenstiel. Minimal drainage.   Ext: PPP, no peripheral edema, TEDs/SCDs in place    Laboratory:  Recent Results (from the past 24 hour(s))   POCT glucose    Collection Time: 18  7:28 AM   Result Value Ref Range    POCT Glucose 94 70 - 110 mg/dL   POCT urine pregnancy    Collection Time: 18  7:58 AM   Result Value Ref Range    POC Preg Test, Ur Negative Negative     Acceptable Yes    CBC auto differential    Collection Time: 18  4:29 AM   Result Value Ref Range    WBC 7.42 3.90 - 12.70 K/uL    RBC 4.55 4.00 - 5.40 M/uL    Hemoglobin 10.6 (L) 12.0 - 16.0 g/dL    Hematocrit 34.6 (L) 37.0 - 48.5 %    MCV 76 (L) 82 - 98 fL    MCH 23.3 (L) 27.0 - 31.0 pg    MCHC 30.6 (L) 32.0 - 36.0 g/dL    RDW 14.2 11.5 - 14.5 %    Platelets 189 150 - 350 K/uL    MPV 9.8 9.2 - 12.9 fL    Gran # (ANC)  5.7 1.8 - 7.7 K/uL    Lymph # 1.0 1.0 - 4.8 K/uL    Mono # 0.7 0.3 - 1.0 K/uL    Eos # 0.0 0.0 - 0.5 K/uL    Baso # 0.00 0.00 - 0.20 K/uL    Gran% 76.9 (H) 38.0 - 73.0 %    Lymph% 13.7 (L) 18.0 - 48.0 %    Mono% 9.3 4.0 - 15.0 %    Eosinophil% 0.0 0.0 - 8.0 %    Basophil% 0.0 0.0 - 1.9 %    Differential Method Automated          ASSESSMENT/PLAN:     Active Hospital Problems    Diagnosis  POA    *S/P ROLDAN-BSO [Z90.710]  Unknown    Endometrial hyperplasia [N85.00]  Yes    Obstructive sleep apnea treated with continuous positive airway pressure (CPAP) [G47.33, Z99.89]  Not Applicable     Chronic    GERD (gastroesophageal reflux disease) [K21.9]  Yes    Anxiety [F41.9]  Yes    Essential hypertension [I10]  Yes     Chronic      Resolved Hospital Problems    Diagnosis Date Resolved POA   No resolved problems to display.       Assessment: 57 y.o.  POD#1 s/p ROLDAN/BSO/ureterolysis/cysto    Plan:   1. Post-op   - Routine post-op advances  - Continue PRN pain medications   - D/C fernandez this morning and perform passive voiding trial  - Encourage ambulation in am  - Encourage IS  - h/h stable at 10/34 from 12/36   - Continue IVF until tolerating regular diet.  - Clear liquid diet, will advance as tolerated  - Antiemetics prn nausea/vomiting.    2. HTN  - Home norvasc and HCTZ held. Resume as necessary.  - PRN hydralazine ordered PRN SBP > 170  - BP: (115-154)/(66-86) 115/66    3. GERD  - Home protonix ordered    4. Asthma  - Home albuterol ordered    5. JAYLON  - Home CPAP in use qHS    6. Headache  - Will give Fioricet now    Dispo: As patient meets appropriate post-op milestones, plan for discharge to home POD#2.    Carla Echeverria M.D.  PGY-3 ObGyn  [phone]

## 2018-06-23 NOTE — PROGRESS NOTES
Progress Note  Gynecology    Admit Date: 2018  LOS: 1    Reason for Admission:  S/P ROLDAN (total abdominal hysterectomy)    SUBJECTIVE:     Leschia T Bastian is a 57 y.o.  who is POD#2 s/p TLH converted to ROLDAN/BSO with right-sided ureterolysis and cysto.  NAEON. Pt doing well this morning. Pain is well controlled with scheduled ibuprofen, Norco 5 x1. She is tolerating a regular diet without N/V. +F/-BM. Urinating and ambulating without difficulty. No further HAs.    OBJECTIVE:     Vital Signs   Temp:  [97.5 °F (36.4 °C)-99.4 °F (37.4 °C)] 98.4 °F (36.9 °C)  Pulse:  [68-96] 68  Resp:  [14-19] 18  SpO2:  [93 %-100 %] 98 %  BP: (115-154)/(66-86) 122/68      Intake/Output Summary (Last 24 hours) at 18 1328  Last data filed at 18 0800   Gross per 24 hour   Intake              532 ml   Output             2400 ml   Net            -1868 ml       Physical Exam:  Gen: A&Ox3, NAD  CV: RRR  Pulm: LCTAB, normal respiratory effort  Abd: normoactive bowel sounds, soft, non-distended, non-tender to palpation without rebound or guarding  Inc: c/d/i with steri strips in place  Ext: PPP, no peripheral edema, TEDs/SCDs in place    Laboratory:  Recent Results (from the past 24 hour(s))   CBC auto differential    Collection Time: 18  4:29 AM   Result Value Ref Range    WBC 7.42 3.90 - 12.70 K/uL    RBC 4.55 4.00 - 5.40 M/uL    Hemoglobin 10.6 (L) 12.0 - 16.0 g/dL    Hematocrit 34.6 (L) 37.0 - 48.5 %    MCV 76 (L) 82 - 98 fL    MCH 23.3 (L) 27.0 - 31.0 pg    MCHC 30.6 (L) 32.0 - 36.0 g/dL    RDW 14.2 11.5 - 14.5 %    Platelets 189 150 - 350 K/uL    MPV 9.8 9.2 - 12.9 fL    Gran # (ANC) 5.7 1.8 - 7.7 K/uL    Lymph # 1.0 1.0 - 4.8 K/uL    Mono # 0.7 0.3 - 1.0 K/uL    Eos # 0.0 0.0 - 0.5 K/uL    Baso # 0.00 0.00 - 0.20 K/uL    Gran% 76.9 (H) 38.0 - 73.0 %    Lymph% 13.7 (L) 18.0 - 48.0 %    Mono% 9.3 4.0 - 15.0 %    Eosinophil% 0.0 0.0 - 8.0 %    Basophil% 0.0 0.0 - 1.9 %    Differential Method Automated           ASSESSMENT/PLAN:     Active Hospital Problems    Diagnosis  POA    *S/P ROLDAN-BSO [Z90.710]  Unknown    Endometrial hyperplasia [N85.00]  Yes    Obstructive sleep apnea treated with continuous positive airway pressure (CPAP) [G47.33, Z99.89]  Not Applicable     Chronic    GERD (gastroesophageal reflux disease) [K21.9]  Yes    Anxiety [F41.9]  Yes    Essential hypertension [I10]  Yes     Chronic      Resolved Hospital Problems    Diagnosis Date Resolved POA   No resolved problems to display.       Assessment: 57 y.o.  POD#2 s/p TLH-->ROLDAN/BSO/R ureterolysis/cysto    Plan:   1. Post-op   - Routine post-op advances  - Continue PRN pain medications   - Passed passive voiding trial  - Encourage ambulation  - Encourage IS  - H/h stable at 10/34 from    - Saline lock IV  - Regular diet  - Antiemetics prn nausea/vomiting.    2. HTN  - Home norvasc 5mg and HCTZ 12.5mg held. Resume as necessary.  - PRN hydralazine ordered PRN SBP > 170  - BP: (114-128)/(57-73) 114/57    3. GERD  - Home protonix 40mg ordered    4. Asthma  - Home albuterol q6h PRN ordered    5. JAYLON  - Home CPAP in use qHS    6. Headache - resolved  - s/p Fioricet x 1     Dispo: As patient meets appropriate post-op milestones, plan for discharge to home today, POD#2.    Paige Potter MD  PGY-3 OB/GYN  [phone]

## 2018-06-24 VITALS
OXYGEN SATURATION: 97 % | HEIGHT: 68 IN | TEMPERATURE: 98 F | RESPIRATION RATE: 18 BRPM | HEART RATE: 73 BPM | SYSTOLIC BLOOD PRESSURE: 115 MMHG | BODY MASS INDEX: 27.58 KG/M2 | DIASTOLIC BLOOD PRESSURE: 70 MMHG | WEIGHT: 182 LBS

## 2018-06-24 PROCEDURE — 25000003 PHARM REV CODE 250: Performed by: STUDENT IN AN ORGANIZED HEALTH CARE EDUCATION/TRAINING PROGRAM

## 2018-06-24 PROCEDURE — 99900035 HC TECH TIME PER 15 MIN (STAT)

## 2018-06-24 PROCEDURE — 94761 N-INVAS EAR/PLS OXIMETRY MLT: CPT

## 2018-06-24 PROCEDURE — A4216 STERILE WATER/SALINE, 10 ML: HCPCS | Performed by: STUDENT IN AN ORGANIZED HEALTH CARE EDUCATION/TRAINING PROGRAM

## 2018-06-24 PROCEDURE — 94660 CPAP INITIATION&MGMT: CPT

## 2018-06-24 RX ORDER — HYDROCODONE BITARTRATE AND ACETAMINOPHEN 5; 325 MG/1; MG/1
1 TABLET ORAL EVERY 6 HOURS PRN
Qty: 30 TABLET | Refills: 0 | Status: SHIPPED | OUTPATIENT
Start: 2018-06-24 | End: 2018-07-11

## 2018-06-24 RX ORDER — HYDROCODONE BITARTRATE AND ACETAMINOPHEN 5; 325 MG/1; MG/1
1 TABLET ORAL EVERY 4 HOURS PRN
Qty: 25 TABLET | Refills: 0 | Status: SHIPPED | OUTPATIENT
Start: 2018-06-24 | End: 2018-07-11

## 2018-06-24 RX ADMIN — PANTOPRAZOLE SODIUM 40 MG: 40 TABLET, DELAYED RELEASE ORAL at 11:06

## 2018-06-24 RX ADMIN — STANDARDIZED SENNA CONCENTRATE AND DOCUSATE SODIUM 1 TABLET: 8.6; 5 TABLET, FILM COATED ORAL at 11:06

## 2018-06-24 RX ADMIN — IBUPROFEN 600 MG: 600 TABLET, FILM COATED ORAL at 11:06

## 2018-06-24 RX ADMIN — HYDROCODONE BITARTRATE AND ACETAMINOPHEN 1 TABLET: 5; 325 TABLET ORAL at 11:06

## 2018-06-24 RX ADMIN — IBUPROFEN 600 MG: 600 TABLET, FILM COATED ORAL at 12:06

## 2018-06-24 RX ADMIN — SODIUM CHLORIDE, PRESERVATIVE FREE 3 ML: 5 INJECTION INTRAVENOUS at 05:06

## 2018-06-24 RX ADMIN — IBUPROFEN 600 MG: 600 TABLET, FILM COATED ORAL at 05:06

## 2018-06-24 NOTE — PLAN OF CARE
Problem: Patient Care Overview  Goal: Plan of Care Review  Outcome: Ongoing (interventions implemented as appropriate)  Patient remains free from injury or falls. Vital signs stable throughout night on room air/CPAP. Positions self independently. Pain managed with PO medications. Good urine output- still green/red. Passing flatus. Daughter at bedside. Bed in low locked position and call light within reach. Will continue to monitor.

## 2018-06-24 NOTE — DISCHARGE INSTRUCTIONS
After Abdominal Hysterectomy for Uterine Prolapse  Uterine prolapse occurs when the uterus drops into the vagina. In severe cases, the uterus can protrude from the vagina. The goal of surgery is to correct this problem and relieve your symptoms. You had a procedure called abdominal hysterectomy. A surgeon removed your uterus through an incision in your abdomen. It usually takes about 3 to 8 weeks to recover from abdominal hysterectomy, but recovery time varies from woman to woman. Here's what you can do to speed your recovery.  Activity  · Ask your friends and family to help with chores and errands while you recover.  · Dont lift anything heavier than 10 pounds for 6 weeks to avoid straining your incisions.  · Dont push a vacuum or do other strenuous housework until the doctor says its OK.  · Climb stairs slowly and pause after every few steps.  · Continue with the coughing and deep breathing exercises that you learned in the hospital.  · Dont drive until your doctor says its OK.  · Ask your doctor when you can expect to return to work.  · Walk as often as you feel able.  · Don't put anything in your vagina until your doctor says it's OK to do so. This includes tampons, douches, and sexual activity.  Other home care  · Avoid constipation.  ¨ Eat fruits, vegetables, and whole grains.  ¨ Drink 6 to 8 glasses of water every day, unless directed otherwise.  ¨ Use a laxative or a mild stool softener if your doctor says its OK.  · Wash your incision with mild soap and water. Pat it dry. Dont use oils, powders, or lotions on your incision.  · Shower as usual.  · Dont have sexor use  tampons or douches until your doctor says its safe to do so.  · Report hot flashes, mood swings, and irritability to your doctor. There may be medications that can help you.  Follow-up care  Make a follow-up appointment as directed by our staff.     When to call your doctor  Call your doctor right away if you have any of the  following:  · Fever above 100.4°F (38.0 °C) or chills  · Bright red vaginal bleeding or foul smelling discharge  · Vaginal bleeding that soaks more than one sanitary pad per hour  · Trouble urinating or a burning sensation when you urinate  · Severe abdominal pain or bloating  · Redness, swelling, or drainage at your incision site  · Shortness of breath  · Shortness of breath or chest pain  · Nausea and vomiting   Date Last Reviewed: 5/19/2015  © 9641-1276 [employer]. [address] [place] [address], [place] [address]. All rights reserved. This information is not intended as a substitute for professional medical care. Always follow your healthcare professional's instructions.            Incision Care  Remember: Follow-up visits allow your healthcare provider to make sure your incision is healing well. Be sure to keep your appointments.     Stitches (sutures), surgical staples, special strips of surgical tape, or surgical skin glue may be used to close incisions. They also help stop bleeding and speed healing. To help your incision heal, follow the tips on this handout.  Home care  Tips for home care include the following:  · Always wash your hands before touching your incision.  · Keep your incision clean and dry.  · Avoid doing things that could cause dirt or sweat to get on your incision.  · Dont pick at scabs. They help protect the wound.  · Keep your incision out of water.  · Take a sponge bath to avoid getting your incision wet, unless your healthcare provider tells you otherwise.  · Ask your provider when can you take a shower or bathe.  · Ask your provider about the best way to keep your incision dry when bathing or showering.  · Pat stitches dry if they get wet. Dont rub.  · Leave the bandage (dressing) in place until you are told to remove it or change it. Change it only as directed, using clean hands.  · After the first 12 hours, change your dressing every 24 hours, or as directed by your  healthcare provider.  · Change your dressing if it gets wet or soiled.  Care for specific closures  Follow these guidelines unless your healthcare provider tells you otherwise:  · Stitches or staples. Once you no longer need to keep these dry, clean the wound daily. First remove the bandage using clean hands. Then wash the area gently with soap and warm water. Use a wet cotton swab to loosen and remove any blood or crust that forms. After cleaning, put a thin layer of antibiotic ointment on. Then put on a new bandage.  · Skin glue. Dont put liquid, ointment, or cream on your wound while the glue is in place. Avoid activities that cause heavy sweating. Protect the wound from sunlight. Do not scratch, rub, or pick at the glue. Do not put tape directly over the glue. The glue should peel off within 5 to 10 days.  · Surgical tape. Keep the area dry. If it gets wet, blot the area dry with a clean towel. Surgical tape usually falls off within 7 to 10 days. If it has not fallen off after 10 days, contact your healthcare provider before taking it off yourself. If you are told to remove the tape, put mineral oil or petroleum jelly on a cotton ball. Gently rub the tape until it is removed.  Changing your dressing  Leave the dressing (bandage) in place until you are told to remove it or change it. Follow the instructions below unless told otherwise by your healthcare provider:  · Always wash your hands before changing your dressing.  · After the first 48 hours, the incision wound usually will have closed. At this point, leave the incision uncovered and open to the air. If the incision has not closed keep it covered.  · Cover your incision only if your clothing is rubbing it or causing irritation.  · Change your dressing if it gets wet or soiled.  Follow-up care  Follow up with your healthcare provider to ask how long sutures or staples should be left in place. Be sure to return for stitch or staple removal as directed. If  dissolving stitches were used in your mouth, these will not need to be removed. They should fall out or dissolve on their own.  If tape closures were used, remove them yourself when your provider recommends if they have not fallen off on their own. If skin glue was used, the glue will wear off by itself.  When to seek medical care  Call your healthcare provider if you have any of the following:  · More pain, redness, swelling, bleeding, or foul-smelling discharge around the incision area  · Fever of 100.4°F (38ºC) or higher, or as directed by your healthcare provider  · Shaking chills  · Vomiting or nausea that doesn't go away  · Numbness, coldness, or tingling around the incision area, or changes in skin color  · Opening of the sutures or wound  · Stitches or staples come apart or fall out or surgical tape falls off before 7 days or as directed by your healthcare provider   Date Last Reviewed: 12/1/2016  © 8284-7380 [employer]. [address] [place] [address]. All rights reserved. This information is not intended as a substitute for professional medical care. Always follow your healthcare professional's instructions.      Acetaminophen; Hydrocodone tablets or capsules  What is this medicine?  ACETAMINOPHEN; HYDROCODONE (a set a NAZANIN leon fen; alexandra droe KOE done) is a pain reliever. It is used to treat moderate to severe pain.  How should I use this medicine?  Take this medicine by mouth with a glass of water. Follow the directions on the prescription label. You can take it with or without food. If it upsets your stomach, take it with food. Do not take your medicine more often than directed.  A special MedGuide will be given to you by the pharmacist with each prescription and refill. Be sure to read this information carefully each time.  Talk to your pediatrician regarding the use of this medicine in children. Special care may be needed.  What side effects may I notice from receiving this  medicine?  Side effects that you should report to your doctor or health care professional as soon as possible:  · allergic reactions like skin rash, itching or hives, swelling of the face, lips, or tongue  · breathing problems  · confusion  · redness, blistering, peeling or loosening of the skin, including inside the mouth  · signs and symptoms of low blood pressure like dizziness; feeling faint or lightheaded, falls; unusually weak or tired  · trouble passing urine or change in the amount of urine  · yellowing of the eyes or skin  Side effects that usually do not require medical attention (report to your doctor or health care professional if they continue or are bothersome):  · constipation  · dry mouth  · nausea, vomiting  · tiredness  What may interact with this medicine?  This medicine may interact with the following medications:  · alcohol  · antiviral medicines for HIV or AIDS  · atropine  · antihistamines for allergy, cough and cold  · certain antibiotics like erythromycin, clarithromycin  · certain medicines for anxiety or sleep  · certain medicines for bladder problems like oxybutynin, tolterodine  · certain medicines for depression like amitriptyline, fluoxetine, sertraline  · certain medicines for fungal infections like ketoconazole and itraconazole  · certain medicines for Parkinson's disease like benztropine, trihexyphenidyl  · certain medicines for seizures like carbamazepine, phenobarbital, phenytoin, primidone  · certain medicines for stomach problems like dicyclomine, hyoscyamine  · certain medicines for travel sickness like scopolamine  · general anesthetics like halothane, isoflurane, methoxyflurane, propofol  · ipratropium  · local anesthetics like lidocaine, pramoxine, tetracaine  · MAOIs like Carbex, Eldepryl, Marplan, Nardil, and Parnate  · medicines that relax muscles for surgery  · other medicines with acetaminophen  · other narcotic medicines for pain or cough  · phenothiazines like  chlorpromazine, mesoridazine, prochlorperazine, thioridazine  · rifampin  What if I miss a dose?  If you miss a dose, take it as soon as you can. If it is almost time for your next dose, take only that dose. Do not take double or extra doses.  Where should I keep my medicine?  Keep out of the reach of children. This medicine can be abused. Keep your medicine in a safe place to protect it from theft. Do not share this medicine with anyone. Selling or giving away this medicine is dangerous and against the law.  This medicine may cause accidental overdose and death if it taken by other adults, children, or pets. Mix any unused medicine with a substance like cat litter or coffee grounds. Then throw the medicine away in a sealed container like a sealed bag or a coffee can with a lid. Do not use the medicine after the expiration date.  Store at room temperature between 15 and 30 degrees C (59 and 86 degrees F).  What should I tell my health care provider before I take this medicine?  They need to know if you have any of these conditions:  · brain tumor  · Crohn's disease, inflammatory bowel disease, or ulcerative colitis  · drug abuse or addiction  · head injury  · heart or circulation problems  · if you often drink alcohol  · kidney disease or problems going to the bathroom  · liver disease  · lung disease, asthma, or breathing problems  · an unusual or allergic reaction to acetaminophen, hydrocodone, other opioid analgesics, other medicines, foods, dyes, or preservatives  · pregnant or trying to get pregnant  · breast-feeding  What should I watch for while using this medicine?  Tell your doctor or health care professional if your pain does not go away, if it gets worse, or if you have new or a different type of pain. You may develop tolerance to the medicine. Tolerance means that you will need a higher dose of the medicine for pain relief. Tolerance is normal and is expected if you take the medicine for a long time.  Do  not suddenly stop taking your medicine because you may develop a severe reaction. Your body becomes used to the medicine. This does NOT mean you are addicted. Addiction is a behavior related to getting and using a drug for a non-medical reason. If you have pain, you have a medical reason to take pain medicine. Your doctor will tell you how much medicine to take. If your doctor wants you to stop the medicine, the dose will be slowly lowered over time to avoid any side effects.  There are different types of narcotic medicines (opiates). If you take more than one type at the same time or if you are taking another medicine that also causes drowsiness, you may have more side effects. Give your health care provider a list of all medicines you use. Your doctor will tell you how much medicine to take. Do not take more medicine than directed. Call emergency for help if you have problems breathing or unusual sleepiness.  Do not take other medicines that contain acetaminophen with this medicine. Always read labels carefully. If you have questions, ask your doctor or pharmacist.  If you take too much acetaminophen get medical help right away. Too much acetaminophen can be very dangerous and cause liver damage. Even if you do not have symptoms, it is important to get help right away.  You may get drowsy or dizzy. Do not drive, use machinery, or do anything that needs mental alertness until you know how this medicine affects you. Do not stand or sit up quickly, especially if you are an older patient. This reduces the risk of dizzy or fainting spells. Alcohol may interfere with the effect of this medicine. Avoid alcoholic drinks.  The medicine will cause constipation. Try to have a bowel movement at least every 2 to 3 days. If you do not have a bowel movement for 3 days, call your doctor or health care professional.  Your mouth may get dry. Chewing sugarless gum or sucking hard candy, and drinking plenty of water may help. Contact  your doctor if the problem does not go away or is severe.  NOTE:This sheet is a summary. It may not cover all possible information. If you have questions about this medicine, talk to your doctor, pharmacist, or health care provider. Copyright© 2017 [employer]

## 2018-06-24 NOTE — PLAN OF CARE
10:42 AM  In agreement and eager for DC.  Tolerating good PO and ambulating well.  Lap sites CDI and transverse abdominal incision CDI c steri-strips intact, no vaginal bleeding to peripad.  Voiding with good UOP.  VU of DC instructions; paperwork passed.     IV removed with cath tip intact, WNL.  To be DCd home with family.    Will be escorted downstairs via  transport team once dressed and ready.  Free from falls or skin breakdown this hospital admission.       10:56 AM    Paged to request script for DC home, not in chart.

## 2018-06-24 NOTE — DISCHARGE SUMMARY
Discharge Summary  Gynecology      Admit Date: 2018    Discharge Date and Time: 2018     Attending Physician: Nidia Bradley MD    Principal Diagnoses: S/P ROLDAN (total abdominal hysterectomy)    Active Hospital Problems    Diagnosis  POA    *S/P ROLDAN-BSO [Z90.710]  Unknown    Endometrial hyperplasia [N85.00]  Yes    Obstructive sleep apnea treated with continuous positive airway pressure (CPAP) [G47.33, Z99.89]  Not Applicable     Chronic    GERD (gastroesophageal reflux disease) [K21.9]  Yes    Anxiety [F41.9]  Yes    Essential hypertension [I10]  Yes     Chronic      Resolved Hospital Problems    Diagnosis Date Resolved POA   No resolved problems to display.       Procedures: Procedure(s) (LRB):  HYSTERECTOMY, TOTAL, LAPAROSCOPIC, ATTEMPTED (N/A)  HYSTERECTOMY, TOTAL, ABDOMINAL, WITH BILATERAL SALPINGO-OOPHORECTOMY (Bilateral)  CYSTOSCOPY  URETEROLYSIS (Right)    Discharged Condition: good    Hospital Course:   Leschia T Bastian is a 57 y.o. y.o.  female who presented on 2018   for above procedures for the treatment. Patient tolerated procedure. Post-operative course was uncomplicated.   On day of discharge, patient was urinating, ambulating, and tolerating a regular diet without difficulty. Pain was well controlled on PO medication. She was discharged home on POD#2 in stable condition with instructions to follow up with Dr. Bradley in 2-4 weeks.     Consults: None    Significant Diagnostic Studies:    Recent Labs  Lab 18  0429   WBC 7.42   HGB 10.6*   HCT 34.6*   MCV 76*           Treatments:   1. Surgery as above    Disposition: Home or Self Care    Patient Instructions:   Current Discharge Medication List      START taking these medications    Details   HYDROcodone-acetaminophen (NORCO) 5-325 mg per tablet Take 1 tablet by mouth every 4 (four) hours as needed.  Qty: 25 tablet, Refills: 0    Associated Diagnoses: S/P ROLDAN (total abdominal hysterectomy)          CONTINUE these medications which have NOT CHANGED    Details   ALLER-EASE 180 mg tablet TAKE 1 TABLET BY MOUTH ONCE A DAY  Qty: 30 tablet, Refills: 0      amLODIPine (NORVASC) 5 MG tablet TAKE ONE TABLET BY MOUTH EVERY DAY (STOP LOSARTAN)  Qty: 30 tablet, Refills: 2      aspirin-acetaminophen-caffeine 250-250-65 mg (EXCEDRIN MIGRAINE) 250-250-65 mg per tablet Take 1 tablet by mouth every 6 (six) hours as needed for Pain.      hydroCHLOROthiazide (MICROZIDE) 12.5 mg capsule Take 1 capsule (12.5 mg total) by mouth once daily.  Qty: 90 capsule, Refills: 3      hydrocortisone 2.5 % cream       ketoconazole (NIZORAL) 2 % cream       ketoconazole (NIZORAL) 2 % shampoo       loratadine (CLARITIN) 10 mg tablet once daily.       pantoprazole (PROTONIX) 40 MG tablet Take 1 tablet (40 mg total) by mouth once daily. Reduced to one a day after a month of twice a day  Qty: 30 tablet, Refills: 4      ranitidine (ZANTAC) 150 MG tablet Take 150 mg by mouth once daily.      triamcinolone acetonide 0.1% (KENALOG) 0.1 % cream       albuterol 90 mcg/actuation inhaler Inhale 2 puffs into the lungs every 6 (six) hours as needed for Wheezing. Rescue  Qty: 18 g, Refills: 1      clindamycin phosphate 1% (CLINDAGEL) 1 % gel       EPINEPHrine (EPIPEN) 0.3 mg/0.3 mL AtIn one injection               Discharge Procedure Orders  Call MD for:  temperature >100.4     Call MD for:  persistent nausea and vomiting or diarrhea     Call MD for:  severe uncontrolled pain     Call MD for:  redness, tenderness, or signs of infection (pain, swelling, redness, odor or green/yellow discharge around incision site)     Call MD for:  difficulty breathing or increased cough     Call MD for:  severe persistent headache     Call MD for:  worsening rash     Call MD for:  persistent dizziness, light-headedness, or visual disturbances     Call MD for:  increased confusion or weakness     No dressing needed         Follow-up Information     Nidia Bradley MD In 6  weeks.    Specialties:  Obstetrics, Obstetrics and Gynecology  Why:  Postoperative check  Contact information:  [address] [id number]  [phone]                   Carla Echeverria M.D.  PGY-3 ObGyn  [phone]

## 2018-06-25 PROBLEM — Z90.710 S/P TAH (TOTAL ABDOMINAL HYSTERECTOMY): Status: ACTIVE | Noted: 2018-06-25

## 2018-06-28 ENCOUNTER — TELEPHONE (OUTPATIENT)
Dept: OBSTETRICS AND GYNECOLOGY | Facility: CLINIC | Age: 58
End: 2018-06-28

## 2018-06-28 NOTE — TELEPHONE ENCOUNTER
I spoke to the pt and she states that she spoke to Dr ybarra on Tuesday and was told to double up on her pain pills. She states that the pain did get butter but now she has nausea and she was given a pill by her brother to help with the nausea and she was going to try and get something to eat to see if that helps her feel better. Pt states that she has taken colace and miralax  and nothing is helping. Pt has not had a bowel movent since last Tuesday. Pt was informed about the post illus when the bladder goes back to sleep and she needs to come in and be seen to do x rays and make sure that nothing is going on with her bowels. Pt is out of town and was told that she can come into the ED for a xray just to check and see what's going on. Pt was given the pre cautions and told to drink 8 to 10 glasses of water a day and dulcolax and miralax three times a day and the colace . Pt was advised to come in but she  Refused to come in and be seen and she wants to wait awhile and see if taken  the regimen of the medicine will help her have a bowel movent on her own.. Pt was told that if she has fever ,redness , swelling,  Bloating,  pain and fever then she needs to come into the ED to be seen either at [facility] are where is she at out of town. Pt verbally understood.

## 2018-07-05 ENCOUNTER — TELEPHONE (OUTPATIENT)
Dept: OBSTETRICS AND GYNECOLOGY | Facility: CLINIC | Age: 58
End: 2018-07-05

## 2018-07-05 NOTE — TELEPHONE ENCOUNTER
----- Message from Cassi Brambila sent at 7/5/2018  1:38 PM CDT -----  Contact: BASTIAN,LESCHIA T [[id number]]            Name of Who is Calling: BASTIAN,LESCHIA T [[id number]]      What is the request in detail: pt calling in regards to paper work for her job,please advise pt       Can the clinic reply by [facility]: no      [facility] [phone]

## 2018-07-05 NOTE — TELEPHONE ENCOUNTER
Spoke to patient. She states her sister brought her FMLA papers to the office on 6/22/18. Provided patient with number for disability to check status.

## 2018-07-09 ENCOUNTER — TELEPHONE (OUTPATIENT)
Dept: OBSTETRICS AND GYNECOLOGY | Facility: CLINIC | Age: 58
End: 2018-07-09

## 2018-07-09 NOTE — TELEPHONE ENCOUNTER
----- Message from Cassi Brambila sent at 7/5/2018  1:38 PM CDT -----  Contact: BASTIAN,LESCHIA T [[id number]]            Name of Who is Calling: BASTIAN,LESCHIA T [[id number]]      What is the request in detail: pt calling in regards to paper work for her job,please advise pt       Can the clinic reply by [facility]: no      [facility] [phone]

## 2018-07-09 NOTE — TELEPHONE ENCOUNTER
----- Message from Hakan Christianson sent at 7/6/2018  4:26 PM CDT -----  PERNELL, PLEASE CALL PT REGARDING HER [place] PAPERS [phone]

## 2018-07-09 NOTE — TELEPHONE ENCOUNTER
Pt was given the number to call about her [facility] papers

## 2018-07-11 ENCOUNTER — OFFICE VISIT (OUTPATIENT)
Dept: OBSTETRICS AND GYNECOLOGY | Facility: CLINIC | Age: 58
End: 2018-07-11
Payer: MEDICAID

## 2018-07-11 ENCOUNTER — OFFICE VISIT (OUTPATIENT)
Dept: INTERNAL MEDICINE | Facility: CLINIC | Age: 58
End: 2018-07-11
Payer: MEDICAID

## 2018-07-11 VITALS
WEIGHT: 178.56 LBS | DIASTOLIC BLOOD PRESSURE: 70 MMHG | HEIGHT: 68 IN | BODY MASS INDEX: 27.06 KG/M2 | SYSTOLIC BLOOD PRESSURE: 110 MMHG

## 2018-07-11 VITALS
SYSTOLIC BLOOD PRESSURE: 132 MMHG | DIASTOLIC BLOOD PRESSURE: 78 MMHG | BODY MASS INDEX: 27.43 KG/M2 | HEART RATE: 83 BPM | WEIGHT: 181 LBS | OXYGEN SATURATION: 97 % | HEIGHT: 68 IN

## 2018-07-11 DIAGNOSIS — K21.9 GASTROESOPHAGEAL REFLUX DISEASE WITHOUT ESOPHAGITIS: Primary | ICD-10-CM

## 2018-07-11 DIAGNOSIS — I10 ESSENTIAL HYPERTENSION: Chronic | ICD-10-CM

## 2018-07-11 DIAGNOSIS — N32.89 BLADDER SPASM: Primary | ICD-10-CM

## 2018-07-11 DIAGNOSIS — Z09 POSTOPERATIVE EXAMINATION: ICD-10-CM

## 2018-07-11 PROCEDURE — 99999 PR PBB SHADOW E&M-EST. PATIENT-LVL III: CPT | Mod: PBBFAC,,, | Performed by: INTERNAL MEDICINE

## 2018-07-11 PROCEDURE — 99213 OFFICE O/P EST LOW 20 MIN: CPT | Mod: PBBFAC | Performed by: INTERNAL MEDICINE

## 2018-07-11 PROCEDURE — 87086 URINE CULTURE/COLONY COUNT: CPT

## 2018-07-11 PROCEDURE — 99212 OFFICE O/P EST SF 10 MIN: CPT | Mod: PBBFAC,27 | Performed by: OBSTETRICS & GYNECOLOGY

## 2018-07-11 PROCEDURE — 99999 PR PBB SHADOW E&M-EST. PATIENT-LVL II: CPT | Mod: PBBFAC,,, | Performed by: OBSTETRICS & GYNECOLOGY

## 2018-07-11 PROCEDURE — 99024 POSTOP FOLLOW-UP VISIT: CPT | Mod: ,,, | Performed by: OBSTETRICS & GYNECOLOGY

## 2018-07-11 PROCEDURE — 99213 OFFICE O/P EST LOW 20 MIN: CPT | Mod: S$PBB,,, | Performed by: INTERNAL MEDICINE

## 2018-07-11 RX ORDER — FLAVOXATE HYDROCHLORIDE 100 MG/1
100 TABLET ORAL 3 TIMES DAILY PRN
Qty: 21 TABLET | Refills: 0 | Status: SHIPPED | OUTPATIENT
Start: 2018-07-11 | End: 2019-03-28 | Stop reason: ALTCHOICE

## 2018-07-11 RX ORDER — METRONIDAZOLE 7.5 MG/G
GEL VAGINAL
Refills: 0 | COMMUNITY
Start: 2018-06-15 | End: 2020-06-01

## 2018-07-11 NOTE — PROGRESS NOTES
Subjective:      Patient ID: Leschia T Bastian is a 57 y.o. female.    Chief Complaint: Follow-up    HPI:  HPI   Follow up: the last visit on 5/30/2018 she was put on pantoprazole twice a day and then decrease to once a day. At this time she takes pantoprazole 40 mg in the am and ranitidine at night.    Cardiology appt:  Stress echo: normal    Gastro: [facility]: Dr. Latisha Ovalle    Allergy : not with [facility]: Ranitidine  Patient Active Problem List   Diagnosis    Essential hypertension    GERD (gastroesophageal reflux disease)    Anxiety    Obstructive sleep apnea treated with continuous positive airway pressure (CPAP)    Mild vitamin D deficiency    Shortness of breath    WELLS (dyspnea on exertion)    Endometrial hyperplasia    S/P ROLDAN-BSO     Past Medical History:   Diagnosis Date    Atypical chest pain     Cervical radiculopathy     GERD (gastroesophageal reflux disease)     Hypertension     Lumbar radiculopathy     JAYLON on CPAP     Seasonal allergies     Shortness of breath      Past Surgical History:   Procedure Laterality Date    AXILLARY HIDRADENITIS EXCISION Bilateral     BUNIONECTOMY      CYSTOSCOPY  6/22/2018    Procedure: CYSTOSCOPY;  Surgeon: Best Jefferson MD;  Location: [facility];  Service: Urology;;    GALLBLADDER SURGERY      HYSTERECTOMY      LAPAROSCOPIC TOTAL HYSTERECTOMY N/A 6/22/2018    Procedure: HYSTERECTOMY, TOTAL, LAPAROSCOPIC, ATTEMPTED;  Surgeon: Nidia Bradley MD;  Location: [facility];  Service: OB/GYN;  Laterality: N/A;  attempted    LYSIS OF ADHESIONS OF URETER Right 6/22/2018    Procedure: URETEROLYSIS;  Surgeon: Best Jefferson MD;  Location: [facility];  Service: Urology;  Laterality: Right;    TOTAL ABDOMINAL HYSTERECTOMY W/ BILATERAL SALPINGOOPHORECTOMY Bilateral 6/22/2018    Procedure: HYSTERECTOMY, TOTAL, ABDOMINAL, WITH BILATERAL SALPINGO-OOPHORECTOMY;  Surgeon: Nidia Bradley MD;  Location: [facility];  Service: OB/GYN;   "Laterality: Bilateral;    TUBAL LIGATION       Family History   Problem Relation Age of Onset    Breast cancer Mother 53    Cancer Mother         breast cancer    Hypertension Mother     Ovarian cancer Cousin     Cancer Cousin         ovarian    Hypertension Sister     Breast cancer Sister 52    Glaucoma Father     Breast cancer Maternal Cousin     Breast cancer Maternal Cousin     Heart disease Maternal Grandfather     Heart disease Paternal Grandfather     Colon cancer Neg Hx      Review of Systems   Still some chest discomfort after reducing this to pantoprazole once a day from twice a day.  Still with shortness of breath: discuss with allergist  Objective:     Vitals:    07/11/18 1320   BP: 132/78   Pulse: 83   SpO2: 97%   Weight: 82.1 kg (181 lb)   Height: 5' 8" (1.727 m)   PainSc: 0-No pain     Body mass index is 27.52 kg/m².  Physical Exam   Constitutional: She appears well-developed and well-nourished.   Neck: No JVD present. No thyromegaly present.   Cardiovascular: Normal rate, normal heart sounds and intact distal pulses.    Pulmonary/Chest: Effort normal and breath sounds normal. No respiratory distress.     Assessment:     1. Gastroesophageal reflux disease without esophagitis    2. Essential hypertension      Plan:   Leschia was seen today for follow-up.    Diagnoses and all orders for this visit:    Gastroesophageal reflux disease without esophagitis: follow up with GI    Essential hypertension: HCTZ 12.5 daily and amlodipine 2.5 mg      Follow-up if symptoms worsen or fail to improve.     Medication List          Accurate as of 7/11/18  2:25 PM. If you have any questions, ask your nurse or doctor.               START taking these medications    flavoxATE 100 mg Tab  Commonly known as:  URISPAS  Take 1 tablet (100 mg total) by mouth 3 (three) times daily as needed.  Started by:  Nidia Bradley MD        CHANGE how you take these medications    amLODIPine 5 MG tablet  Commonly known " as:  NORVASC  TAKE ONE TABLET BY MOUTH EVERY DAY (STOP LOSARTAN)  What changed:  See the new instructions.     ranitidine 300 MG tablet  Commonly known as:  ZANTAC  What changed:  Another medication with the same name was removed. Continue taking this medication, and follow the directions you see here.  Changed by:  Karina Gomez MD        CONTINUE taking these medications    albuterol 90 mcg/actuation inhaler  Inhale 2 puffs into the lungs every 6 (six) hours as needed for Wheezing. Rescue     ALLER-EASE 180 MG tablet  Generic drug:  fexofenadine  TAKE 1 TABLET BY MOUTH ONCE A DAY     clindamycin phosphate 1% 1 % gel  Commonly known as:  CLINDAGEL     EPINEPHrine 0.3 mg/0.3 mL Atin  Commonly known as:  EPIPEN     hydroCHLOROthiazide 12.5 mg capsule  Commonly known as:  MICROZIDE  Take 1 capsule (12.5 mg total) by mouth once daily.     hydrocortisone 2.5 % cream     * ketoconazole 2 % cream  Commonly known as:  NIZORAL     * ketoconazole 2 % shampoo  Commonly known as:  NIZORAL     loratadine 10 mg tablet  Commonly known as:  CLARITIN     metroNIDAZOLE 0.75 % vaginal gel  Commonly known as:  METROGEL     pantoprazole 40 MG tablet  Commonly known as:  PROTONIX  Take 1 tablet (40 mg total) by mouth once daily. Reduced to one a day after a month of twice a day     triamcinolone acetonide 0.1% 0.1 % cream  Commonly known as:  KENALOG        * This list has 2 medication(s) that are the same as other medications prescribed for you. Read the directions carefully, and ask your doctor or other care provider to review them with you.            STOP taking these medications    aspirin-acetaminophen-caffeine 250-250-65 mg 250-250-65 mg per tablet  Commonly known as:  EXCEDRIN MIGRAINE  Stopped by:  Karina Gomez MD     HYDROcodone-acetaminophen 5-325 mg per tablet  Commonly known as:  NORCO  Stopped by:  Karina Gomez MD           Where to Get Your Medications      These medications were sent to [facility] -  [place] [address]  [address] [id number]    Phone:  [phone]   · flavoxATE 100 mg Tab

## 2018-07-12 LAB
BACTERIA UR CULT: NORMAL
BACTERIA UR CULT: NORMAL

## 2018-07-16 NOTE — PROGRESS NOTES
HISTORY OF PRESENT ILLNESS:    Leschia T Bastian is a 57 y.o. female  No LMP recorded. Patient is perimenopausal. presents today for post op visit.   Patient has been tolerating regular diet with normal bowel function.  She reports normal BM with no urinary complaints. Taking less pain medications and is overall feeling better. Procedure and pathology reviewed in detail with patient who voices understanding.  Patient does report some bladder spasms.     FINAL PATHOLOGIC DIAGNOSIS  UTERUS, 164g: CERVIX-ACUTE AND CHRONIC CERVICITIS WITH CERVICAL ULCER AND FISTULA,  ANTERIOR CERVIX; SQUAMOUS METAPLASIA; NO OTHER SIGNIFICANT HISTOLOGICAL ABNORMALITY,  NO EVIDENCE OF DYSPLASIA;  ENDOMETRIUM-DISORDERED PROLIFERATIVE;  MYOMETRIUM-LEIOMYOMATA; ADENOMYOSIS;  SEROSA-SUBSEROSAL LEIOMYOMATA;  LEFT FALLOPIAN TUBE: TUBAL LIGATION SCAR;  LEFT OVARY: CORPORA ALBICANTIA;  RIGHT FALLOPIAN TUBE: TUBAL LIGATION SCAR;  RIGHT OVARY: CORPORA ALBICANTIA.    Past Medical History:   Diagnosis Date    Atypical chest pain     Cervical radiculopathy     GERD (gastroesophageal reflux disease)     Hypertension     Lumbar radiculopathy     JAYLON on CPAP     Seasonal allergies     Shortness of breath        Past Surgical History:   Procedure Laterality Date    AXILLARY HIDRADENITIS EXCISION Bilateral     BUNIONECTOMY      CYSTOSCOPY  2018    Procedure: CYSTOSCOPY;  Surgeon: Best Jefferson MD;  Location: [facility];  Service: Urology;;    GALLBLADDER SURGERY      HYSTERECTOMY      LAPAROSCOPIC TOTAL HYSTERECTOMY N/A 2018    Procedure: HYSTERECTOMY, TOTAL, LAPAROSCOPIC, ATTEMPTED;  Surgeon: Nidia Bradley MD;  Location: [facility] OR;  Service: OB/GYN;  Laterality: N/A;  attempted    LYSIS OF ADHESIONS OF URETER Right 2018    Procedure: URETEROLYSIS;  Surgeon: Best Jefferson MD;  Location: [facility] OR;  Service: Urology;  Laterality: Right;    TOTAL ABDOMINAL HYSTERECTOMY W/ BILATERAL SALPINGOOPHORECTOMY Bilateral  6/22/2018    Procedure: HYSTERECTOMY, TOTAL, ABDOMINAL, WITH BILATERAL SALPINGO-OOPHORECTOMY;  Surgeon: Nidia Bradley MD;  Location: [facility];  Service: OB/GYN;  Laterality: Bilateral;    TUBAL LIGATION         MEDICATIONS AND ALLERGIES:      Current Outpatient Prescriptions:     albuterol 90 mcg/actuation inhaler, Inhale 2 puffs into the lungs every 6 (six) hours as needed for Wheezing. Rescue, Disp: 18 g, Rfl: 1    ALLER-EASE 180 mg tablet, TAKE 1 TABLET BY MOUTH ONCE A DAY, Disp: 30 tablet, Rfl: 0    amLODIPine (NORVASC) 5 MG tablet, TAKE ONE TABLET BY MOUTH EVERY DAY (STOP LOSARTAN) (Patient taking differently: She should take 1/2 a tablet daily 7/11/2018 goal 130/80), Disp: 30 tablet, Rfl: 2    clindamycin phosphate 1% (CLINDAGEL) 1 % gel, , Disp: , Rfl:     EPINEPHrine (EPIPEN) 0.3 mg/0.3 mL AtIn, one injection, Disp: , Rfl:     hydroCHLOROthiazide (MICROZIDE) 12.5 mg capsule, Take 1 capsule (12.5 mg total) by mouth once daily., Disp: 90 capsule, Rfl: 3    hydrocortisone 2.5 % cream, , Disp: , Rfl:     ketoconazole (NIZORAL) 2 % cream, , Disp: , Rfl:     ketoconazole (NIZORAL) 2 % shampoo, , Disp: , Rfl:     loratadine (CLARITIN) 10 mg tablet, once daily. , Disp: , Rfl:     metroNIDAZOLE (METROGEL) 0.75 % vaginal gel, INSERT ONE applicator VAGINALLY AT BEDTIME FOR FIVE nights, Disp: , Rfl: 0    pantoprazole (PROTONIX) 40 MG tablet, Take 1 tablet (40 mg total) by mouth once daily. Reduced to one a day after a month of twice a day, Disp: 30 tablet, Rfl: 4    ranitidine (ZANTAC) 300 MG tablet, Take 300 mg by mouth nightly., Disp: , Rfl: 12    triamcinolone acetonide 0.1% (KENALOG) 0.1 % cream, , Disp: , Rfl:     flavoxATE (URISPAS) 100 mg Tab, Take 1 tablet (100 mg total) by mouth 3 (three) times daily as needed., Disp: 21 tablet, Rfl: 0    Review of patient's allergies indicates:   Allergen Reactions    Clams      Allergy testing confirmed    Codeine Itching    Scallops     Shrimp       Peeling shrimp, got a rash, allergy test confirmed    Crab Rash     Throat closed       Family History   Problem Relation Age of Onset    Breast cancer Mother 53    Cancer Mother         breast cancer    Hypertension Mother     Ovarian cancer Cousin     Cancer Cousin         ovarian    Hypertension Sister     Breast cancer Sister 52    Glaucoma Father     Breast cancer Maternal Cousin     Breast cancer Maternal Cousin     Heart disease Maternal Grandfather     Heart disease Paternal Grandfather     Colon cancer Neg Hx        Social History     Social History    Marital status: Legally      Spouse name: N/A    Number of children: N/A    Years of education: N/A     Occupational History    Not on file.     Social History Main Topics    Smoking status: Never Smoker    Smokeless tobacco: Never Used    Alcohol use Yes      Comment: socially    Drug use: No    Sexual activity: Not Currently     Partners: Male     Other Topics Concern    Not on file     Social History Narrative    No narrative on file       COMPREHENSIVE GYN HISTORY:  PAP History: Denies abnormal Paps.  Infection History: Denies STDs. Denies PID.  Benign History: Denies uterine fibroids. Denies ovarian cysts. Denies endometriosis. Denies other conditions.  Cancer History: Denies cervical cancer. Denies uterine cancer or hyperplasia. Denies ovarian cancer. Denies vulvar cancer or pre-cancer. Denies vaginal cancer or pre-cancer. Denies breast cancer. Denies colon cancer.  Sexual Activity History: Reports currently being sexually active  Menstrual History: Every 28 days, flows for 4 days. Light flow.  Dysmenorrhea History: Denies dysmenorrhea.  Contraception History:      ROS:  GENERAL: No weight changes. No swelling. No fatigue. No fever.  CARDIOVASCULAR: No chest pain. No shortness of breath. No leg cramps.   NEUROLOGICAL: No headaches. No vision changes.  BREASTS: No pain. No lumps. No discharge.  ABDOMEN: No pain. No  nausea. No vomiting. No diarrhea. No constipation.  REPRODUCTIVE: No abnormal bleeding.   VULVA: No pain. No lesions. No itching.  VAGINA: No relaxation. No itching. No odor. No discharge. No lesions.  URINARY: No incontinence. No nocturia. No frequency. No dysuria.    PE:  APPEARANCE: Well nourished, well developed, in no acute distress.  AFFECT: WNL, alert and oriented x 3.  ABDOMEN: Soft. No tenderness or masses. No hepatosplenomegaly. No hernias. Incision healing well. No evidence of infection.     DIAGNOSIS:  Post op visit     FOLLOW-UP with me in 4 weeks

## 2018-07-20 ENCOUNTER — OFFICE VISIT (OUTPATIENT)
Dept: SLEEP MEDICINE | Facility: CLINIC | Age: 58
End: 2018-07-20
Payer: MEDICAID

## 2018-07-20 VITALS
WEIGHT: 181 LBS | DIASTOLIC BLOOD PRESSURE: 81 MMHG | HEART RATE: 77 BPM | HEIGHT: 68 IN | BODY MASS INDEX: 27.43 KG/M2 | SYSTOLIC BLOOD PRESSURE: 121 MMHG

## 2018-07-20 DIAGNOSIS — G47.33 OBSTRUCTIVE SLEEP APNEA: Primary | ICD-10-CM

## 2018-07-20 PROCEDURE — 99999 PR PBB SHADOW E&M-EST. PATIENT-LVL III: CPT | Mod: PBBFAC,,, | Performed by: NURSE PRACTITIONER

## 2018-07-20 PROCEDURE — 99213 OFFICE O/P EST LOW 20 MIN: CPT | Mod: S$PBB,,, | Performed by: NURSE PRACTITIONER

## 2018-07-20 PROCEDURE — 99213 OFFICE O/P EST LOW 20 MIN: CPT | Mod: PBBFAC,PO | Performed by: NURSE PRACTITIONER

## 2018-07-20 NOTE — PROGRESS NOTES
Leschia Bastian  was seen as f/u today for the mgt of JAYLON. Last seen 7/10/17    Continues to use her apap.  ESS=17. Tries to use it nightly, avoids sleep w/o it or else she'll forget to put it on. Dirty filters despite changing often. Most days not tired, some times she is.   avg use 6-7h/n. AHI 2-4, 90% tile 9.4-9.7cm. 23/30d>4h. % mask fit.   Interim- hysterectomy            HISTORY:  12/09/2016 INITIAL HISTORY OF PRESENT ILLNESS:  Leschia T Bastian is a 57 y.o. female is here to be evaluated for a sleep disorder.       CHIEF COMPLAINT:      The patient's complaints include excessive daytime sleepiness, excessive daytime fatigue, snoring,  gasping for air in sleep and interrupted sleep since  6 years.    SOB  1 ER visit for chest pain.    Reports  dry mouth and sore throat  Reports occasional nasal congestion   Reports  morning headaches  Reports  interrupted sleep  Denies frequent leg movements  Denies symptoms concerning for parasomnia    The ESS ([place] Sleepiness Score) taken on initial visit is 14 /24      SLEEP ROUTINE AND LIFESTYLE 07/20/2018 :    Occupation:unemployed.    Bed partner:  since 5 year ago - was taking Xanax     Time to bed - wake up time on a workday : 9-10 PM to 6 AM  Time to bed - wake up time on a day off: 9-10 PM to 6 AM  Sleep onset latency: 30 min - 1 hour  Disruptions or awakenings: 3-4  Time to fall back into sleep: 1-2 hour   Perceived sleep quality: 2/5  Perceived total sleep time:  5  hours.  Daytime naps: 2    Exercise routine: sometimes  Caffeine:  Stopped    3/29/17:  She has since undergone a home sleep study which was reviewed with her today. She continues to report difficulty staying asleep, wakes up after 2-3 hr then up for the day. She walks in am. Has lost 6# in interim. She is always tired. Wears mouth guard but still opening mouth. Watches tv in bed ~ 9-10p. ESS=18. Sleeps mainly on her back. Nocturia. Sleeps alone. Recurrent episode CP recently.  "    5/17/17:   Since last seen she has been setup with APAP 6-16cm. Falling asleep earlier now around 9-10p, ready for bed. Having mild air leaks. Using it nightly except just 3-4 nights since setup. No more snoring. Less disrupted sleep now, less nocturia. Likes dreamwear mask. No chin strap. Denies pressure intolerance. Denies nasal drying. Denies oral drying. ESS=10    Interrogation- new machine condition, 30davg 5.1h/n. 90 %tile 8.4-9, 0% periodic, heat at 3-4. 23/30d>4h. AHI 8.6-11    7/10/17:  Continues to use her apap. Took a bit to get adjusted to start pressure 7cm but got used to it. Has mild leaks from mask, time for new one next week. Denies pressure intolerance. Less nocturia. ESS=11. Occasional mouth opening which can awaken her. Ran out of water once and woke her up/smelled. Works late and up early at times affecting sleep. Still has tv on but turns back to it. When timer goes off she may awaken. Continues to feel less tired.     Interrogation- dreamwear, ahi 4-5.2, 90 %tile 9.1-11, avg 30d 6.1h/n. 98% mask fit      HST 3/14/17 AHI 13(RDI 28)/low sat 81.4%        REVIEW OF SYSTEMS:   Sleep related symptoms as per HPI, dyspnea, 8# gain.Otherwise a balance review of 10-systems is negative.       PHYSICAL EXAM:  /81   Pulse 77   Ht 5' 8" (1.727 m)   Wt 82.1 kg (181 lb)   BMI 27.52 kg/m²   GENERAL: Overweight body habitus, well groomed.      ASSESSMENT:    1. JAYLON, mild, moderate by RDI criteria. 6/17/17: Excellent adherence, symptoms are improved. 76% compliance. Mild leaks most likely mild residual ahi. Symptoms are improved, she is feeling better. 7/10/17: Remains adherent, ahi improved and overall 1h more 30d sleep time. Does not feel she is getting enough total sleep time 7/20/18: Continued use, ahi<5, sleeping longer   She has medical co-morbidities of hypertension, GERD, which can be worsened by JAYLON.       PLAN:   Discussed etiology of JAYLON and potential ramifications of untreated JAYLON, " including heart disease, hypertension, cognitive difficulties, stroke, and diabetes.      APAP continue 8-14cm. [facility] DME prn supplies.  RTC 12-mos adherence monitoring.     Do not drive sleepy  Encouraged continued weight loss efforts for potential improvement of JAYLON and overall health benefits, formal exercise routine/aerobic/walking  Continue good sleep hygiene

## 2018-08-03 ENCOUNTER — OFFICE VISIT (OUTPATIENT)
Dept: OBSTETRICS AND GYNECOLOGY | Facility: CLINIC | Age: 58
End: 2018-08-03
Payer: COMMERCIAL

## 2018-08-03 ENCOUNTER — HOSPITAL ENCOUNTER (OUTPATIENT)
Dept: RADIOLOGY | Facility: HOSPITAL | Age: 58
Discharge: HOME | End: 2018-08-03
Attending: SURGERY
Payer: COMMERCIAL

## 2018-08-03 VITALS
BODY MASS INDEX: 27.43 KG/M2 | WEIGHT: 181 LBS | SYSTOLIC BLOOD PRESSURE: 94 MMHG | DIASTOLIC BLOOD PRESSURE: 72 MMHG | HEIGHT: 68 IN

## 2018-08-03 DIAGNOSIS — Z91.89 AT HIGH RISK FOR BREAST CANCER: ICD-10-CM

## 2018-08-03 DIAGNOSIS — R35.0 URINE FREQUENCY: Primary | ICD-10-CM

## 2018-08-03 DIAGNOSIS — Z12.31 ENCOUNTER FOR SCREENING MAMMOGRAM FOR BREAST CANCER: ICD-10-CM

## 2018-08-03 DIAGNOSIS — Z90.710 S/P TAH (TOTAL ABDOMINAL HYSTERECTOMY): ICD-10-CM

## 2018-08-03 PROCEDURE — 99213 OFFICE O/P EST LOW 20 MIN: CPT | Mod: PBBFAC | Performed by: OBSTETRICS & GYNECOLOGY

## 2018-08-03 PROCEDURE — 99024 POSTOP FOLLOW-UP VISIT: CPT | Mod: S$GLB,,, | Performed by: OBSTETRICS & GYNECOLOGY

## 2018-08-03 PROCEDURE — 99999 PR PBB SHADOW E&M-EST. PATIENT-LVL III: CPT | Mod: PBBFAC,,, | Performed by: OBSTETRICS & GYNECOLOGY

## 2018-08-03 PROCEDURE — 77067 SCR MAMMO BI INCL CAD: CPT | Mod: TC,PO

## 2018-08-03 PROCEDURE — 77063 BREAST TOMOSYNTHESIS BI: CPT | Mod: 26,,, | Performed by: RADIOLOGY

## 2018-08-03 PROCEDURE — 77067 SCR MAMMO BI INCL CAD: CPT | Mod: 26,,, | Performed by: RADIOLOGY

## 2018-08-03 PROCEDURE — 87086 URINE CULTURE/COLONY COUNT: CPT

## 2018-08-03 NOTE — PROGRESS NOTES
HISTORY OF PRESENT ILLNESS:    Leschia T Bastian is a 57 y.o. female  Patient's last menstrual period was 2018. presents today for post op visit.   Patient has been tolerating regular diet with normal bowel function.  She reports normal BM with no urinary complaints.   Taking no pain medications and is overall feeling better. Procedure and pathology reviewed in detail with patient who voices understanding.  Patient does report some bladder spasms, relieved with failed.  Patient also reports incomplete emptying in the in after each urination.      FINAL PATHOLOGIC DIAGNOSIS  UTERUS, 164g: CERVIX-ACUTE AND CHRONIC CERVICITIS WITH CERVICAL ULCER AND FISTULA,  ANTERIOR CERVIX; SQUAMOUS METAPLASIA; NO OTHER SIGNIFICANT HISTOLOGICAL ABNORMALITY,  NO EVIDENCE OF DYSPLASIA;  ENDOMETRIUM-DISORDERED PROLIFERATIVE;  MYOMETRIUM-LEIOMYOMATA; ADENOMYOSIS;  SEROSA-SUBSEROSAL LEIOMYOMATA;  LEFT FALLOPIAN TUBE: TUBAL LIGATION SCAR;  LEFT OVARY: CORPORA ALBICANTIA;  RIGHT FALLOPIAN TUBE: TUBAL LIGATION SCAR;  RIGHT OVARY: CORPORA ALBICANTIA.    Past Medical History:   Diagnosis Date    Atypical chest pain     Cervical radiculopathy     GERD (gastroesophageal reflux disease)     Hypertension     Lumbar radiculopathy     JAYLON on CPAP     Seasonal allergies     Shortness of breath        Past Surgical History:   Procedure Laterality Date    AXILLARY HIDRADENITIS EXCISION Bilateral     BUNIONECTOMY      CYSTOSCOPY  2018    Procedure: CYSTOSCOPY;  Surgeon: Best Jefferson MD;  Location: [facility];  Service: Urology;;    GALLBLADDER SURGERY      HYSTERECTOMY      LAPAROSCOPIC TOTAL HYSTERECTOMY N/A 2018    Procedure: HYSTERECTOMY, TOTAL, LAPAROSCOPIC, ATTEMPTED;  Surgeon: Nidia Bradley MD;  Location: [facility] OR;  Service: OB/GYN;  Laterality: N/A;  attempted    LYSIS OF ADHESIONS OF URETER Right 2018    Procedure: URETEROLYSIS;  Surgeon: Best Jefferson MD;  Location: [facility];  Service:  Urology;  Laterality: Right;    TOTAL ABDOMINAL HYSTERECTOMY W/ BILATERAL SALPINGOOPHORECTOMY Bilateral 6/22/2018    TUBAL LIGATION         MEDICATIONS AND ALLERGIES:      Current Outpatient Prescriptions:     albuterol 90 mcg/actuation inhaler, Inhale 2 puffs into the lungs every 6 (six) hours as needed for Wheezing. Rescue, Disp: 18 g, Rfl: 1    ALLER-EASE 180 mg tablet, TAKE 1 TABLET BY MOUTH ONCE A DAY, Disp: 30 tablet, Rfl: 0    amLODIPine (NORVASC) 5 MG tablet, TAKE ONE TABLET BY MOUTH EVERY DAY (STOP LOSARTAN) (Patient taking differently: She should take 1/2 a tablet daily 7/11/2018 goal 130/80), Disp: 30 tablet, Rfl: 2    clindamycin phosphate 1% (CLINDAGEL) 1 % gel, , Disp: , Rfl:     EPINEPHrine (EPIPEN) 0.3 mg/0.3 mL AtIn, one injection, Disp: , Rfl:     flavoxATE (URISPAS) 100 mg Tab, Take 1 tablet (100 mg total) by mouth 3 (three) times daily as needed., Disp: 21 tablet, Rfl: 0    hydroCHLOROthiazide (MICROZIDE) 12.5 mg capsule, Take 1 capsule (12.5 mg total) by mouth once daily., Disp: 90 capsule, Rfl: 3    hydrocortisone 2.5 % cream, , Disp: , Rfl:     ketoconazole (NIZORAL) 2 % cream, , Disp: , Rfl:     ketoconazole (NIZORAL) 2 % shampoo, , Disp: , Rfl:     loratadine (CLARITIN) 10 mg tablet, once daily. , Disp: , Rfl:     metroNIDAZOLE (METROGEL) 0.75 % vaginal gel, INSERT ONE applicator VAGINALLY AT BEDTIME FOR FIVE nights, Disp: , Rfl: 0    pantoprazole (PROTONIX) 40 MG tablet, Take 1 tablet (40 mg total) by mouth once daily. Reduced to one a day after a month of twice a day, Disp: 30 tablet, Rfl: 4    ranitidine (ZANTAC) 300 MG tablet, Take 300 mg by mouth nightly., Disp: , Rfl: 12    triamcinolone acetonide 0.1% (KENALOG) 0.1 % cream, , Disp: , Rfl:     Review of patient's allergies indicates:   Allergen Reactions    Clams      Allergy testing confirmed    Codeine Itching    Scallops     Shrimp      Peeling shrimp, got a rash, allergy test confirmed    Crab Rash     Throat  closed       Family History   Problem Relation Age of Onset    Breast cancer Mother 53    Cancer Mother         breast cancer    Hypertension Mother     Ovarian cancer Cousin     Cancer Cousin         ovarian    Hypertension Sister     Breast cancer Sister 52    Glaucoma Father     Breast cancer Maternal Cousin     Breast cancer Maternal Cousin     Heart disease Maternal Grandfather     Heart disease Paternal Grandfather     Colon cancer Brother 46       Social History     Social History    Marital status: Legally      Spouse name: N/A    Number of children: N/A    Years of education: N/A     Occupational History    Not on file.     Social History Main Topics    Smoking status: Never Smoker    Smokeless tobacco: Never Used    Alcohol use Yes      Comment: socially    Drug use: No    Sexual activity: Not Currently     Partners: Male     Other Topics Concern    Not on file     Social History Narrative    No narrative on file       COMPREHENSIVE GYN HISTORY:  PAP History: Denies abnormal Paps.  Infection History: Denies STDs. Denies PID.  Benign History: Denies uterine fibroids. Denies ovarian cysts. Denies endometriosis. Denies other conditions.  Cancer History: Denies cervical cancer. Denies uterine cancer or hyperplasia. Denies ovarian cancer. Denies vulvar cancer or pre-cancer. Denies vaginal cancer or pre-cancer. Denies breast cancer. Denies colon cancer.  Sexual Activity History: Reports currently being sexually active  Menstrual History: Every 28 days, flows for 4 days. Light flow.  Dysmenorrhea History: Denies dysmenorrhea.  Contraception History:      ROS:  GENERAL: No weight changes. No swelling. No fatigue. No fever.  CARDIOVASCULAR: No chest pain. No shortness of breath. No leg cramps.   NEUROLOGICAL: No headaches. No vision changes.  BREASTS: No pain. No lumps. No discharge.  ABDOMEN: No pain. No nausea. No vomiting. No diarrhea. No constipation.  REPRODUCTIVE: No abnormal  bleeding.   VULVA: No pain. No lesions. No itching.  VAGINA: No relaxation. No itching. No odor. No discharge. No lesions.  URINARY: No incontinence. No nocturia. No frequency. No dysuria.    APPEARANCE: Well nourished, well developed, in no acute distress.  AFFECT: WNL, alert and oriented x 3.  SKIN: No acne or hirsutism.  NECK: Neck symmetric, without masses or thyromegaly.  NODES: No inguinal, cervical, axillary or femoral lymph node enlargement.  CHEST: Good respiratory effort.   ABDOMEN: Soft. No tenderness or masses. No hepatosplenomegaly. No hernias.  Incision well healed  BREASTS: Symmetrical, no skin changes, visible lesions, palpable masses or nipple discharge bilaterally.  PELVIC: External female genitalia without lesions. Female hair distribution. Adequate perineal body, Normal urethral meatus. Vagina moist and well rugated without lesions or discharge. Vaginal cuff well healed.     DIAGNOSIS:  Post op visit     FOLLOW-UP with me in 4 weeks  Pelvic rest  No heavy lifting.    Letter written for patient to return to work.

## 2018-08-05 LAB — BACTERIA UR CULT: NO GROWTH

## 2018-09-06 ENCOUNTER — OFFICE VISIT (OUTPATIENT)
Dept: OBSTETRICS AND GYNECOLOGY | Facility: CLINIC | Age: 58
End: 2018-09-06
Payer: COMMERCIAL

## 2018-09-06 VITALS
HEIGHT: 68 IN | SYSTOLIC BLOOD PRESSURE: 134 MMHG | DIASTOLIC BLOOD PRESSURE: 82 MMHG | BODY MASS INDEX: 27.83 KG/M2 | WEIGHT: 183.63 LBS

## 2018-09-06 DIAGNOSIS — Z90.710 S/P TAH (TOTAL ABDOMINAL HYSTERECTOMY): Primary | ICD-10-CM

## 2018-09-06 DIAGNOSIS — N85.00 ENDOMETRIAL HYPERPLASIA: ICD-10-CM

## 2018-09-06 PROCEDURE — 99024 POSTOP FOLLOW-UP VISIT: CPT | Mod: S$GLB,,, | Performed by: OBSTETRICS & GYNECOLOGY

## 2018-09-06 PROCEDURE — 99999 PR PBB SHADOW E&M-EST. PATIENT-LVL III: CPT | Mod: PBBFAC,,, | Performed by: OBSTETRICS & GYNECOLOGY

## 2018-09-10 NOTE — PROGRESS NOTES
HISTORY OF PRESENT ILLNESS:    Leschia T Bastian is a 58 y.o. female  Patient's last menstrual period was 2018. presents today for post op visit.   Patient has been tolerating regular diet with normal bowel function.  She reports normal BM with no urinary complaints.   Taking no pain medications and is overall feeling better.   Bladder spasms have resolved.     FINAL PATHOLOGIC DIAGNOSIS  UTERUS, 164g: CERVIX-ACUTE AND CHRONIC CERVICITIS WITH CERVICAL ULCER AND FISTULA,  ANTERIOR CERVIX; SQUAMOUS METAPLASIA; NO OTHER SIGNIFICANT HISTOLOGICAL ABNORMALITY,  NO EVIDENCE OF DYSPLASIA;  ENDOMETRIUM-DISORDERED PROLIFERATIVE;  MYOMETRIUM-LEIOMYOMATA; ADENOMYOSIS;  SEROSA-SUBSEROSAL LEIOMYOMATA;  LEFT FALLOPIAN TUBE: TUBAL LIGATION SCAR;  LEFT OVARY: CORPORA ALBICANTIA;  RIGHT FALLOPIAN TUBE: TUBAL LIGATION SCAR;  RIGHT OVARY: CORPORA ALBICANTIA.    Past Medical History:   Diagnosis Date    Atypical chest pain     Cervical radiculopathy     GERD (gastroesophageal reflux disease)     Hypertension     Lumbar radiculopathy     JAYOLN on CPAP     Seasonal allergies     Shortness of breath        Past Surgical History:   Procedure Laterality Date    AXILLARY HIDRADENITIS EXCISION Bilateral     BUNIONECTOMY      COLONOSCOPY N/A 2015    Performed by [facility]ales [facility] at [facility] (4TH FLR)    CYSTOSCOPY  2018    Procedure: CYSTOSCOPY;  Surgeon: Best Jefferson MD;  Location: [facility] OR;  Service: Urology;;    CYSTOSCOPY  2018    Performed by Best Jefferson MD at [facility] OR    GALLBLADDER SURGERY      HYSTERECTOMY      HYSTERECTOMY, TOTAL, ABDOMINAL, WITH BILATERAL SALPINGO-OOPHORECTOMY Bilateral 2018    Performed by Nidia Bradley MD at [facility] OR    HYSTERECTOMY, TOTAL, LAPAROSCOPIC, ATTEMPTED N/A 2018    Performed by Nidia Bradley MD at [facility] OR    LAPAROSCOPIC TOTAL HYSTERECTOMY N/A 2018    Procedure: HYSTERECTOMY, TOTAL, LAPAROSCOPIC, ATTEMPTED;  Surgeon: Nidia  DARIANA Bradley MD;  Location: [facility] OR;  Service: OB/GYN;  Laterality: N/A;  attempted    LYSIS OF ADHESIONS OF URETER Right 6/22/2018    Procedure: URETEROLYSIS;  Surgeon: Best Jefferson MD;  Location: [facility] OR;  Service: Urology;  Laterality: Right;    TOTAL ABDOMINAL HYSTERECTOMY W/ BILATERAL SALPINGOOPHORECTOMY Bilateral 6/22/2018    TUBAL LIGATION      URETEROLYSIS Right 6/22/2018    Performed by Best Jefferson MD at [facility] OR       MEDICATIONS AND ALLERGIES:      Current Outpatient Medications:     albuterol 90 mcg/actuation inhaler, Inhale 2 puffs into the lungs every 6 (six) hours as needed for Wheezing. Rescue, Disp: 18 g, Rfl: 1    ALLER-EASE 180 mg tablet, TAKE 1 TABLET BY MOUTH ONCE A DAY, Disp: 30 tablet, Rfl: 0    amLODIPine (NORVASC) 5 MG tablet, TAKE ONE TABLET BY MOUTH EVERY DAY (STOP LOSARTAN) (Patient taking differently: She should take 1/2 a tablet daily 7/11/2018 goal 130/80), Disp: 30 tablet, Rfl: 2    clindamycin phosphate 1% (CLINDAGEL) 1 % gel, , Disp: , Rfl:     EPINEPHrine (EPIPEN) 0.3 mg/0.3 mL AtIn, one injection, Disp: , Rfl:     flavoxATE (URISPAS) 100 mg Tab, Take 1 tablet (100 mg total) by mouth 3 (three) times daily as needed., Disp: 21 tablet, Rfl: 0    hydroCHLOROthiazide (MICROZIDE) 12.5 mg capsule, Take 1 capsule (12.5 mg total) by mouth once daily., Disp: 90 capsule, Rfl: 3    hydrocortisone 2.5 % cream, , Disp: , Rfl:     ketoconazole (NIZORAL) 2 % cream, , Disp: , Rfl:     ketoconazole (NIZORAL) 2 % shampoo, , Disp: , Rfl:     loratadine (CLARITIN) 10 mg tablet, once daily. , Disp: , Rfl:     metroNIDAZOLE (METROGEL) 0.75 % vaginal gel, INSERT ONE applicator VAGINALLY AT BEDTIME FOR FIVE nights, Disp: , Rfl: 0    pantoprazole (PROTONIX) 40 MG tablet, Take 1 tablet (40 mg total) by mouth once daily. Reduced to one a day after a month of twice a day, Disp: 30 tablet, Rfl: 4    ranitidine (ZANTAC) 300 MG tablet, Take 300 mg by mouth nightly., Disp: , Rfl: 12     triamcinolone acetonide 0.1% (KENALOG) 0.1 % cream, , Disp: , Rfl:     Review of patient's allergies indicates:   Allergen Reactions    Clams      Allergy testing confirmed    Codeine Itching    Scallops     Shrimp      Peeling shrimp, got a rash, allergy test confirmed    Crab Rash     Throat closed       Family History   Problem Relation Age of Onset    Breast cancer Mother 53    Cancer Mother         breast cancer    Hypertension Mother     Ovarian cancer Cousin     Cancer Cousin         ovarian    Hypertension Sister     Breast cancer Sister 52    Glaucoma Father     Breast cancer Maternal Cousin     Breast cancer Maternal Cousin     Heart disease Maternal Grandfather     Heart disease Paternal Grandfather     Colon cancer Brother 46       Social History     Socioeconomic History    Marital status: Legally      Spouse name: Not on file    Number of children: Not on file    Years of education: Not on file    Highest education level: Not on file   Social Needs    Financial resource strain: Not on file    Food insecurity - worry: Not on file    Food insecurity - inability: Not on file    Transportation needs - medical: Not on file    Transportation needs - non-medical: Not on file   Occupational History    Not on file   Tobacco Use    Smoking status: Never Smoker    Smokeless tobacco: Never Used   Substance and Sexual Activity    Alcohol use: Yes     Comment: socially    Drug use: No    Sexual activity: Not Currently     Partners: Male   Other Topics Concern    Not on file   Social History Narrative    Not on file       COMPREHENSIVE GYN HISTORY:  PAP History: Denies abnormal Paps.  Infection History: Denies STDs. Denies PID.  Benign History: Denies uterine fibroids. Denies ovarian cysts. Denies endometriosis. Denies other conditions.  Cancer History: Denies cervical cancer. Denies uterine cancer or hyperplasia. Denies ovarian cancer. Denies vulvar cancer or pre-cancer.  Denies vaginal cancer or pre-cancer. Denies breast cancer. Denies colon cancer.  Sexual Activity History: Reports currently being sexually active  Menstrual History: Every 28 days, flows for 4 days. Light flow.  Dysmenorrhea History: Denies dysmenorrhea.  Contraception History:      ROS:  GENERAL: No weight changes. No swelling. No fatigue. No fever.  CARDIOVASCULAR: No chest pain. No shortness of breath. No leg cramps.   NEUROLOGICAL: No headaches. No vision changes.  BREASTS: No pain. No lumps. No discharge.  ABDOMEN: No pain. No nausea. No vomiting. No diarrhea. No constipation.  REPRODUCTIVE: No abnormal bleeding.   VULVA: No pain. No lesions. No itching.  VAGINA: No relaxation. No itching. No odor. No discharge. No lesions.  URINARY: No incontinence. No nocturia. No frequency. No dysuria.    APPEARANCE: Well nourished, well developed, in no acute distress.  AFFECT: WNL, alert and oriented x 3.  SKIN: No acne or hirsutism.  NECK: Neck symmetric, without masses or thyromegaly.  NODES: No inguinal, cervical, axillary or femoral lymph node enlargement.  CHEST: Good respiratory effort.   ABDOMEN: Soft. No tenderness or masses. No hepatosplenomegaly. No hernias.  Incision well healed  BREASTS: Symmetrical, no skin changes, visible lesions, palpable masses or nipple discharge bilaterally.  PELVIC: External female genitalia without lesions. Female hair distribution. Adequate perineal body, Normal urethral meatus. Vagina moist and well rugated without lesions or discharge. Vaginal cuff well healed.     DIAGNOSIS:  Post op visit     FOLLOW-UP with me in3 months

## 2018-11-28 RX ORDER — AMLODIPINE BESYLATE 5 MG/1
TABLET ORAL
Qty: 30 TABLET | Refills: 2 | Status: SHIPPED | OUTPATIENT
Start: 2018-11-28 | End: 2019-06-12 | Stop reason: SDUPTHER

## 2018-12-06 RX ORDER — MINERAL OIL
180 ENEMA (ML) RECTAL DAILY
Qty: 30 TABLET | Refills: 2 | Status: SHIPPED | OUTPATIENT
Start: 2018-12-06 | End: 2020-06-01

## 2019-03-19 ENCOUNTER — HOSPITAL ENCOUNTER (OUTPATIENT)
Dept: RADIOLOGY | Facility: HOSPITAL | Age: 59
Discharge: HOME | End: 2019-03-19
Attending: INTERNAL MEDICINE
Payer: COMMERCIAL

## 2019-03-19 ENCOUNTER — OFFICE VISIT (OUTPATIENT)
Dept: INTERNAL MEDICINE | Facility: CLINIC | Age: 59
End: 2019-03-19
Payer: COMMERCIAL

## 2019-03-19 ENCOUNTER — TELEPHONE (OUTPATIENT)
Dept: INTERNAL MEDICINE | Facility: CLINIC | Age: 59
End: 2019-03-19

## 2019-03-19 VITALS
SYSTOLIC BLOOD PRESSURE: 124 MMHG | OXYGEN SATURATION: 96 % | HEART RATE: 80 BPM | BODY MASS INDEX: 28.6 KG/M2 | DIASTOLIC BLOOD PRESSURE: 80 MMHG | HEIGHT: 68 IN | WEIGHT: 188.69 LBS

## 2019-03-19 DIAGNOSIS — S43.421A SPRAIN OF RIGHT ROTATOR CUFF CAPSULE, INITIAL ENCOUNTER: ICD-10-CM

## 2019-03-19 DIAGNOSIS — S43.421A SPRAIN OF RIGHT ROTATOR CUFF CAPSULE, INITIAL ENCOUNTER: Primary | ICD-10-CM

## 2019-03-19 PROCEDURE — 3074F PR MOST RECENT SYSTOLIC BLOOD PRESSURE < 130 MM HG: ICD-10-PCS | Mod: CPTII,S$GLB,, | Performed by: INTERNAL MEDICINE

## 2019-03-19 PROCEDURE — 3074F SYST BP LT 130 MM HG: CPT | Mod: CPTII,S$GLB,, | Performed by: INTERNAL MEDICINE

## 2019-03-19 PROCEDURE — 73030 X-RAY EXAM OF SHOULDER: CPT | Mod: TC,RT

## 2019-03-19 PROCEDURE — 3079F PR MOST RECENT DIASTOLIC BLOOD PRESSURE 80-89 MM HG: ICD-10-PCS | Mod: CPTII,S$GLB,, | Performed by: INTERNAL MEDICINE

## 2019-03-19 PROCEDURE — 73030 X-RAY EXAM OF SHOULDER: CPT | Mod: 26,RT,, | Performed by: RADIOLOGY

## 2019-03-19 PROCEDURE — 73030 XR SHOULDER TRAUMA 3 VIEW RIGHT: ICD-10-PCS | Mod: 26,RT,, | Performed by: RADIOLOGY

## 2019-03-19 PROCEDURE — 99999 PR PBB SHADOW E&M-EST. PATIENT-LVL IV: CPT | Mod: PBBFAC,,, | Performed by: INTERNAL MEDICINE

## 2019-03-19 PROCEDURE — 99213 PR OFFICE/OUTPT VISIT, EST, LEVL III, 20-29 MIN: ICD-10-PCS | Mod: 25,S$GLB,, | Performed by: INTERNAL MEDICINE

## 2019-03-19 PROCEDURE — 96372 PR INJECTION,THERAP/PROPH/DIAG2ST, IM OR SUBCUT: ICD-10-PCS | Mod: S$GLB,,, | Performed by: INTERNAL MEDICINE

## 2019-03-19 PROCEDURE — 3008F BODY MASS INDEX DOCD: CPT | Mod: CPTII,S$GLB,, | Performed by: INTERNAL MEDICINE

## 2019-03-19 PROCEDURE — 96372 THER/PROPH/DIAG INJ SC/IM: CPT | Mod: S$GLB,,, | Performed by: INTERNAL MEDICINE

## 2019-03-19 PROCEDURE — 99999 PR PBB SHADOW E&M-EST. PATIENT-LVL IV: ICD-10-PCS | Mod: PBBFAC,,, | Performed by: INTERNAL MEDICINE

## 2019-03-19 PROCEDURE — 3079F DIAST BP 80-89 MM HG: CPT | Mod: CPTII,S$GLB,, | Performed by: INTERNAL MEDICINE

## 2019-03-19 PROCEDURE — 3008F PR BODY MASS INDEX (BMI) DOCUMENTED: ICD-10-PCS | Mod: CPTII,S$GLB,, | Performed by: INTERNAL MEDICINE

## 2019-03-19 PROCEDURE — 99213 OFFICE O/P EST LOW 20 MIN: CPT | Mod: 25,S$GLB,, | Performed by: INTERNAL MEDICINE

## 2019-03-19 RX ORDER — KETOROLAC TROMETHAMINE 30 MG/ML
30 INJECTION, SOLUTION INTRAMUSCULAR; INTRAVENOUS
Status: COMPLETED | OUTPATIENT
Start: 2019-03-19 | End: 2019-03-19

## 2019-03-19 RX ADMIN — KETOROLAC TROMETHAMINE 30 MG: 30 INJECTION, SOLUTION INTRAMUSCULAR; INTRAVENOUS at 10:03

## 2019-03-19 NOTE — TELEPHONE ENCOUNTER
----- Message from Christine Kessler sent at 3/19/2019  3:31 PM CDT -----  Contact: Pt self Mobile/Home [phone]   Patient would like to get test results  Name of test (lab, mammo, etc.):  XR MISC    Date of test:  03/19/2019  Ordering provider: Doctor Christiano Thornton   Where was the test performed:  At the [facility] location   Would the patient rather a call back or a response via [facility]?:  A call back please.   Comments:

## 2019-03-19 NOTE — PROGRESS NOTES
Subjective:       Patient ID: Leschia T Bastian is a 58 y.o. female.    Chief Complaint: Arm Pain (right )    HPI - 6 days of right upper arm and shoulder pain with limited ROM.  Trouble reaching above head; pain 10/10, achy in character.  No numb/tingle/electric shock feeling.  Like a toothache.  She hasn't had any trauma- no falls or MVA.  Similar problem with the left shoulder years ago.  Advil last night did not help.    Pmh/meds:  Reviewed and reconciled in EPIC with patient during visit today.    Review of Systems   Constitutional: Negative for fever.   HENT: Negative for congestion.    Respiratory: Negative for shortness of breath.    Cardiovascular: Negative for chest pain.   Gastrointestinal: Negative for abdominal pain.   Genitourinary: Negative for difficulty urinating.   Musculoskeletal: Positive for arthralgias. Negative for neck pain.   Skin: Negative for rash.   Neurological: Negative for headaches.   Psychiatric/Behavioral: Negative for sleep disturbance.       Objective:      Physical Exam   Constitutional: She appears well-developed and well-nourished. No distress.   HENT:   Head: Normocephalic and atraumatic.   Musculoskeletal:   Right shoulder:  Cannot lift hand over head.  Uses accessory muscles    On rotator cuff testing, infraspinatus weakness on right   Neurological: She is alert.   Skin: She is not diaphoretic.   Psychiatric: She has a normal mood and affect.   Nursing note and vitals reviewed.      Assessment:       1. Sprain of right rotator cuff capsule, initial encounter        Plan:       Leschia was seen today for arm pain.    Diagnoses and all orders for this visit:    Sprain of right rotator cuff capsule, initial encounter - new problem.  Will do films, though doubt we'll find anything.  Ketorolac now, then to physical therapy.  Prefer tylenol for pain control at home, though she can use advil sparingly.  -     X-Ray Shoulder Trauma 3 view Right; Future  -     Ambulatory consult to  Physical Therapy  -     ketorolac injection 30 mg    rtc prn    G Brock Thornton MD MPH  Staff Internist

## 2019-03-28 ENCOUNTER — OFFICE VISIT (OUTPATIENT)
Dept: INTERNAL MEDICINE | Facility: CLINIC | Age: 59
End: 2019-03-28
Payer: COMMERCIAL

## 2019-03-28 ENCOUNTER — TELEPHONE (OUTPATIENT)
Dept: INTERNAL MEDICINE | Facility: CLINIC | Age: 59
End: 2019-03-28

## 2019-03-28 ENCOUNTER — HOSPITAL ENCOUNTER (OUTPATIENT)
Dept: RADIOLOGY | Facility: HOSPITAL | Age: 59
Discharge: HOME | End: 2019-03-28
Attending: INTERNAL MEDICINE
Payer: COMMERCIAL

## 2019-03-28 VITALS
HEART RATE: 78 BPM | BODY MASS INDEX: 27.62 KG/M2 | DIASTOLIC BLOOD PRESSURE: 80 MMHG | WEIGHT: 186.5 LBS | OXYGEN SATURATION: 92 % | SYSTOLIC BLOOD PRESSURE: 116 MMHG | HEIGHT: 69 IN

## 2019-03-28 DIAGNOSIS — Z12.11 COLON CANCER SCREENING: ICD-10-CM

## 2019-03-28 DIAGNOSIS — R06.02 SHORTNESS OF BREATH: ICD-10-CM

## 2019-03-28 DIAGNOSIS — E55.9 MILD VITAMIN D DEFICIENCY: ICD-10-CM

## 2019-03-28 DIAGNOSIS — Z12.31 ENCOUNTER FOR SCREENING MAMMOGRAM FOR BREAST CANCER: ICD-10-CM

## 2019-03-28 DIAGNOSIS — Z91.89 AT HIGH RISK FOR BREAST CANCER: ICD-10-CM

## 2019-03-28 DIAGNOSIS — G47.33 OBSTRUCTIVE SLEEP APNEA TREATED WITH CONTINUOUS POSITIVE AIRWAY PRESSURE (CPAP): Chronic | ICD-10-CM

## 2019-03-28 DIAGNOSIS — K21.9 GASTROESOPHAGEAL REFLUX DISEASE WITHOUT ESOPHAGITIS: ICD-10-CM

## 2019-03-28 DIAGNOSIS — I10 ESSENTIAL HYPERTENSION: Chronic | ICD-10-CM

## 2019-03-28 DIAGNOSIS — Z00.00 PHYSICAL EXAM: Primary | ICD-10-CM

## 2019-03-28 DIAGNOSIS — Z00.00 WELLNESS EXAMINATION: ICD-10-CM

## 2019-03-28 PROBLEM — N85.00 ENDOMETRIAL HYPERPLASIA: Status: RESOLVED | Noted: 2018-06-22 | Resolved: 2019-03-28

## 2019-03-28 PROCEDURE — 71046 XR CHEST PA AND LATERAL: ICD-10-PCS | Mod: 26,,, | Performed by: RADIOLOGY

## 2019-03-28 PROCEDURE — 3074F PR MOST RECENT SYSTOLIC BLOOD PRESSURE < 130 MM HG: ICD-10-PCS | Mod: CPTII,S$GLB,, | Performed by: INTERNAL MEDICINE

## 2019-03-28 PROCEDURE — 99396 PREV VISIT EST AGE 40-64: CPT | Mod: S$GLB,,, | Performed by: INTERNAL MEDICINE

## 2019-03-28 PROCEDURE — 3079F DIAST BP 80-89 MM HG: CPT | Mod: CPTII,S$GLB,, | Performed by: INTERNAL MEDICINE

## 2019-03-28 PROCEDURE — 71046 X-RAY EXAM CHEST 2 VIEWS: CPT | Mod: 26,,, | Performed by: RADIOLOGY

## 2019-03-28 PROCEDURE — 99396 PR PREVENTIVE VISIT,EST,40-64: ICD-10-PCS | Mod: S$GLB,,, | Performed by: INTERNAL MEDICINE

## 2019-03-28 PROCEDURE — 3074F SYST BP LT 130 MM HG: CPT | Mod: CPTII,S$GLB,, | Performed by: INTERNAL MEDICINE

## 2019-03-28 PROCEDURE — 3079F PR MOST RECENT DIASTOLIC BLOOD PRESSURE 80-89 MM HG: ICD-10-PCS | Mod: CPTII,S$GLB,, | Performed by: INTERNAL MEDICINE

## 2019-03-28 PROCEDURE — 71046 X-RAY EXAM CHEST 2 VIEWS: CPT | Mod: TC

## 2019-03-28 PROCEDURE — 99999 PR PBB SHADOW E&M-EST. PATIENT-LVL III: CPT | Mod: PBBFAC,,, | Performed by: INTERNAL MEDICINE

## 2019-03-28 PROCEDURE — 99999 PR PBB SHADOW E&M-EST. PATIENT-LVL III: ICD-10-PCS | Mod: PBBFAC,,, | Performed by: INTERNAL MEDICINE

## 2019-03-28 NOTE — PATIENT INSTRUCTIONS
New shingles vaccine: SHINGRIX ( 2018) not live, 90%,  2 shots, one at day zero and the 2nd at 2-6 months: any pharmacy can give it.    THIS IS ON BACKORDER    Outpatient Procedures Ordered This Visit     Ambulatory Referral to Breast Surgery         CBC auto differential         Comprehensive metabolic panel         Fecal Immunochemical Test (iFOBT)         Lipid panel         TSH         Vitamin D         X-Ray Chest PA And Lateral

## 2019-03-28 NOTE — PROGRESS NOTES
Subjective:      Patient ID: Leschia T Bastian is a 58 y.o. female.    Chief Complaint: Annual Exam    HPI:  HPI   Patient is here for her annual exam  Patient went to  last week for arm pain which improved on ketolac injection.    Pain on the right side of chest, started yesterday at work. It was a quick pain, intermittently over several hours. She tried no medications. No associated symptoms but she always had shortness of breath. Last year a stress echo was negative.    Patient had a hysterectomy last year.    Since the last appt she has had a cholecystectomy     Annual exam: 3/28/2019  Colonoscopy 11/29/2010: history of colon polyps: 7/21/2015 follow up 5 years (brother has colon cancer)  Mammogram: due 8/2019 MRI due now  3 members of  immediate family with breast cancer  Gyn: yearly  Optho:yearly outside  Flu: due in the fall  Tetanus: declines  Shingrix discussed  Pneumovax: not due  Prevnar: not due     Labs  Vit D : check          Patient Active Problem List   Diagnosis    Essential hypertension    GERD (gastroesophageal reflux disease)    Obstructive sleep apnea treated with continuous positive airway pressure (CPAP)    Mild vitamin D deficiency    Shortness of breath    WELLS (dyspnea on exertion)    S/P ROLDAN-BSO     Past Medical History:   Diagnosis Date    Atypical chest pain     Cervical radiculopathy     GERD (gastroesophageal reflux disease)     Hypertension     Lumbar radiculopathy     JAYLON on CPAP     Seasonal allergies     Shortness of breath      Past Surgical History:   Procedure Laterality Date    AXILLARY HIDRADENITIS EXCISION Bilateral     BUNIONECTOMY      COLONOSCOPY N/A 7/21/2015    Performed by [facility] at [facility] (4TH FLR)    CYSTOSCOPY  6/22/2018    [facility] Jefferson [facility]    GALLBLADDER SURGERY      HYSTERECTOMY      HYSTERECTOMY, TOTAL, ABDOMINAL, WITH BILATERAL SALPINGO-OOPHORECTOMY Bilateral 6/22/2018    Performed by Nidia WESTBROOK  "MD [facility] at [facility] OR    HYSTERECTOMY, TOTAL, LAPAROSCOPIC, ATTEMPTED N/A 6/22/2018    Performed by [facility] Bradley [facility] at [facility] OR    TUBAL LIGATION      URETEROLYSIS Right 6/22/2018    Performed by Best Jefferson MD at [facility] OR     Family History   Problem Relation Age of Onset    Breast cancer Mother 53    Cancer Mother         breast cancer    Hypertension Mother     Ovarian cancer Cousin     Cancer Cousin         ovarian    Hypertension Sister     Breast cancer Sister 52    Glaucoma Father     Breast cancer Maternal Cousin     Breast cancer Maternal Cousin     Heart disease Maternal Grandfather     Heart disease Paternal Grandfather     Colon cancer Brother 46     Review of Systems   Constitutional: Negative for chills, fever and unexpected weight change.   HENT: Negative for ear pain, nosebleeds, sore throat, trouble swallowing and voice change.    Eyes: Negative for photophobia and visual disturbance.   Respiratory: Positive for shortness of breath. Negative for cough and wheezing.    Cardiovascular: Negative for chest pain, palpitations and leg swelling.   Gastrointestinal: Negative for abdominal distention, abdominal pain and blood in stool.   Genitourinary: Negative for difficulty urinating, dysuria, flank pain and hematuria.   Musculoskeletal: Negative for back pain, gait problem and joint swelling.   Skin: Negative for color change and rash.   Neurological: Negative for seizures and headaches.   Hematological: Negative for adenopathy. Does not bruise/bleed easily.   Psychiatric/Behavioral: Negative for dysphoric mood and suicidal ideas.     Objective:     Vitals:    03/28/19 0704   BP: 116/80   Pulse: 78   SpO2: (!) 92%   Weight: 84.6 kg (186 lb 8.2 oz)   Height: 5' 9" (1.753 m)   PainSc: 0-No pain     Body mass index is 27.54 kg/m².  Physical Exam   Constitutional: She is oriented to person, place, and time. She appears well-developed and well-nourished. No distress.   HENT: "   Right Ear: Tympanic membrane and ear canal normal.   Left Ear: Tympanic membrane and ear canal normal.   Nose: No sinus tenderness or nasal deformity.   Mouth/Throat: No oropharyngeal exudate or posterior oropharyngeal erythema.   Eyes: Pupils are equal, round, and reactive to light. Conjunctivae, EOM and lids are normal.   Neck: Carotid bruit is not present. No thyromegaly present.   Cardiovascular: Normal rate, regular rhythm and intact distal pulses.   Pulmonary/Chest: Effort normal and breath sounds normal. No respiratory distress.   Abdominal: Soft. Bowel sounds are normal. There is no tenderness.   Musculoskeletal: She exhibits no edema or tenderness.   Lymphadenopathy:        Head (right side): No submandibular adenopathy present.        Head (left side): No submandibular adenopathy present.     She has no cervical adenopathy.     She has no axillary adenopathy.        Right: No inguinal adenopathy present.        Left: No inguinal adenopathy present.   Neurological: She is alert and oriented to person, place, and time. She has normal strength.   Skin: Skin is intact. No lesion noted.   Psychiatric: She has a normal mood and affect. Her speech is normal and behavior is normal.     Assessment:     1. Physical exam    2. Shortness of breath    3. Wellness examination    4. Encounter for screening mammogram for breast cancer    5. Colon cancer screening    6. At high risk for breast cancer    7. Essential hypertension    8. Gastroesophageal reflux disease without esophagitis    9. Mild vitamin D deficiency    10. Obstructive sleep apnea treated with continuous positive airway pressure (CPAP)      Plan:   Leschia was seen today for annual exam.    Diagnoses and all orders for this visit:    Physical exam: problem list reviewed    Shortness of breath: check  -     X-Ray Chest PA And Lateral; Future    Wellness examination: screening and updated  -     CBC auto differential; Future  -     Comprehensive metabolic  panel; Future  -     Lipid panel; Future  -     Vitamin D; Future  -     TSH; Future    Encounter for screening mammogram for breast cancer: Dr. Arredondo    Colon cancer screening  -     Fecal Immunochemical Test (iFOBT); Future    At high risk for breast cancer  -     Ambulatory Referral to Breast Surgery    Essential hypertension: well controlled    Gastroesophageal reflux disease without esophagitis on med    Mild vitamin D deficiency: check lab    Obstructive sleep apnea treated with continuous positive airway pressure (CPAP): uses and benefits from equipment        Problem List Items Addressed This Visit     Essential hypertension (Chronic)    Obstructive sleep apnea treated with continuous positive airway pressure (CPAP) (Chronic)    GERD (gastroesophageal reflux disease)    Mild vitamin D deficiency    Shortness of breath    Relevant Orders    X-Ray Chest PA And Lateral      Other Visit Diagnoses     Physical exam    -  Primary    Wellness examination        Relevant Orders    CBC auto differential    Comprehensive metabolic panel    Lipid panel    Vitamin D    TSH    Encounter for screening mammogram for breast cancer        Colon cancer screening        Relevant Orders    Fecal Immunochemical Test (iFOBT)    At high risk for breast cancer        Relevant Orders    Ambulatory Referral to Breast Surgery        Orders Placed This Encounter   Procedures    X-Ray Chest PA And Lateral     Standing Status:   Future     Standing Expiration Date:   5/27/2019    CBC auto differential     Standing Status:   Future     Standing Expiration Date:   5/27/2019    Comprehensive metabolic panel     Standing Status:   Future     Standing Expiration Date:   5/27/2019    Lipid panel     Standing Status:   Future     Standing Expiration Date:   5/27/2019    Vitamin D     Standing Status:   Future     Standing Expiration Date:   5/27/2019    TSH     Standing Status:   Future     Standing Expiration Date:   5/27/2019    Fecal  Immunochemical Test (iFOBT)     Standing Status:   Future     Standing Expiration Date:   5/26/2020    Ambulatory Referral to Breast Surgery     Referral Priority:   Routine     Referral Type:   Consultation     Referral Reason:   Specialty Services Required     Referred to Provider:   Jocelyne Arredondo MD     Requested Specialty:   Breast Surgery     Number of Visits Requested:   1     Follow up in about 1 year (around 3/28/2020) for Annual exam.     Medication List           Accurate as of 3/28/19  7:43 AM. If you have any questions, ask your nurse or doctor.               CONTINUE taking these medications    albuterol 90 mcg/actuation inhaler  Commonly known as:  PROVENTIL/VENTOLIN HFA  Inhale 2 puffs into the lungs every 6 (six) hours as needed for Wheezing. Rescue     amLODIPine 5 MG tablet  Commonly known as:  NORVASC  TAKE ONE TABLET BY MOUTH EVERY DAY (STOP LOSARTAN)     EPINEPHrine 0.3 mg/0.3 mL Atin  Commonly known as:  EPIPEN     fexofenadine 180 MG tablet  Commonly known as:  ALLER-EASE  Take 1 tablet (180 mg total) by mouth once daily.     hydroCHLOROthiazide 12.5 mg capsule  Commonly known as:  MICROZIDE  Take 1 capsule (12.5 mg total) by mouth once daily.     hydrocortisone 2.5 % cream     * ketoconazole 2 % cream  Commonly known as:  NIZORAL     * ketoconazole 2 % shampoo  Commonly known as:  NIZORAL     loratadine 10 mg tablet  Commonly known as:  CLARITIN     metroNIDAZOLE 0.75 % vaginal gel  Commonly known as:  METROGEL     pantoprazole 40 MG tablet  Commonly known as:  PROTONIX  Take 1 tablet (40 mg total) by mouth once daily. Reduced to one a day after a month of twice a day     ranitidine 300 MG tablet  Commonly known as:  ZANTAC     triamcinolone acetonide 0.1% 0.1 % cream  Commonly known as:  KENALOG         * This list has 2 medication(s) that are the same as other medications prescribed for you. Read the directions carefully, and ask your doctor or other care provider to review them with you.             STOP taking these medications    clindamycin phosphate 1% 1 % gel  Commonly known as:  CLINDAGEL  Stopped by:  Karina Gomez MD     flavoxATE 100 mg Tab  Commonly known as:  URISPAS  Stopped by:  Karina Gomez MD

## 2019-03-29 ENCOUNTER — PATIENT MESSAGE (OUTPATIENT)
Dept: INTERNAL MEDICINE | Facility: CLINIC | Age: 59
End: 2019-03-29

## 2019-03-29 DIAGNOSIS — E83.52 SERUM CALCIUM ELEVATED: Primary | ICD-10-CM

## 2019-03-29 RX ORDER — ERGOCALCIFEROL 1.25 MG/1
50000 CAPSULE ORAL
Qty: 3 CAPSULE | Refills: 4 | Status: SHIPPED | OUTPATIENT
Start: 2019-03-29 | End: 2020-03-26 | Stop reason: SDUPTHER

## 2019-04-01 ENCOUNTER — TELEPHONE (OUTPATIENT)
Dept: SURGERY | Facility: CLINIC | Age: 59
End: 2019-04-01

## 2019-04-01 NOTE — TELEPHONE ENCOUNTER
Contacted the patient regarding COGEONhart message sent by the patient to see  for red spot on breast.  The patient is scheduled to be seen on Wednesday 4/3/19 at 1:15 pm.  The patient voiced understanding of appointment date, time, and location.

## 2019-04-02 NOTE — PROGRESS NOTES
"Date of Service:4/3/2018    DIAGNOSIS:   This is a 58 y.o. female with lifetime risk of breast cancer >20%.       HISTORY OF PRESENT ILLNESS:   Leschia Bastian is a 58 y.o. female comes in for follow up.  Patient presents to clinic today with concern of a new red spot on her breast that she would like to be checked. Patient states she noticed a mild red spot on chest about the size of a quarter about a week ago. States the spot was not itchy or painful, not associated with lump. Thinks the skin was initially peeling slightly; but that has improved over time. Says that she tends to itchy skin on her chest; applied a prescription hydrocoritisone cream that she has on hand to area which improved peeling skin (although she states the area was never itchy). Today she feels the area is less red overall, and has no associated peeling skin. Additionally, still has no associated lump. No recent fevers/chills. She does not think she has had any recent sunburn to the area-never wears low cut shirts.    Past medical and surgical history is updated with new changes. There have been no changes to family history. The patient denies constitutional symptoms of night sweats, weight loss, new headaches, visual changes, new back or bony pain, chest pain, or shortness of breath.      IMAGING:   MRI: 2018 negative  MM2018 negative  MM2018 negative     PHYSICAL EXAM:   BP (!) 151/89 (BP Location: Left arm, Patient Position: Sitting, BP Method: Medium (Automatic))   Pulse 77   Temp 98 °F (36.7 °C) (Oral)   Ht 5' 9" (1.753 m)   Wt 86.1 kg (189 lb 13.1 oz)   LMP 2018   BMI 28.03 kg/m²   General: The patient appears well and is in no acute distress.   Neuro: Alert & oriented x3.   Cardiovascular: RRR  Breasts: The exam was done with the patient seated and supine. Left breast - within normal limits. No palpable masses and no abnormal skin or nipple findings. No supraclavicular or axillary lymphadenopathy on the " left side.   Right breast - within normal limits. No palpable masses. Quarter sized area of erythema at 2 oclock 4 cm from nipple. No supraclavicular or axillary lymphadenopathy on the right side.  Pulmonary: nonlabored breathing  Abdomen: soft,nondistended.    Extremities: Bilateral full arm range of motion without  lymphedema.     ASSESSMENT:   This is a 58 y.o. female at high risk of breast cancer who presents today with concern of new onset erythema to right breast.     PLAN:   1. MRI due; will schedule.  Discussed punch biopsy of area if not improved in the next week or so.  2. Continue monthly self breast exams and call the clinic with any changes or problems.  3. Mammogram in 6 months.  4. Return to clinic in 1 year. The patient is in agreement with the plan. Questions were encouraged and answered to patient's satisfaction. Leschia will call our office with any questions or concerns.

## 2019-04-03 ENCOUNTER — OFFICE VISIT (OUTPATIENT)
Dept: SURGERY | Facility: CLINIC | Age: 59
End: 2019-04-03
Payer: COMMERCIAL

## 2019-04-03 VITALS
SYSTOLIC BLOOD PRESSURE: 151 MMHG | HEART RATE: 77 BPM | BODY MASS INDEX: 28.11 KG/M2 | TEMPERATURE: 98 F | DIASTOLIC BLOOD PRESSURE: 89 MMHG | WEIGHT: 189.81 LBS | HEIGHT: 69 IN

## 2019-04-03 DIAGNOSIS — Z91.89 AT HIGH RISK FOR BREAST CANCER: ICD-10-CM

## 2019-04-03 PROCEDURE — 99999 PR PBB SHADOW E&M-EST. PATIENT-LVL III: ICD-10-PCS | Mod: PBBFAC,,, | Performed by: SURGERY

## 2019-04-03 PROCEDURE — 3079F PR MOST RECENT DIASTOLIC BLOOD PRESSURE 80-89 MM HG: ICD-10-PCS | Mod: CPTII,S$GLB,, | Performed by: SURGERY

## 2019-04-03 PROCEDURE — 99213 OFFICE O/P EST LOW 20 MIN: CPT | Mod: S$GLB,,, | Performed by: SURGERY

## 2019-04-03 PROCEDURE — 3008F PR BODY MASS INDEX (BMI) DOCUMENTED: ICD-10-PCS | Mod: CPTII,S$GLB,, | Performed by: SURGERY

## 2019-04-03 PROCEDURE — 99999 PR PBB SHADOW E&M-EST. PATIENT-LVL III: CPT | Mod: PBBFAC,,, | Performed by: SURGERY

## 2019-04-03 PROCEDURE — 3077F SYST BP >= 140 MM HG: CPT | Mod: CPTII,S$GLB,, | Performed by: SURGERY

## 2019-04-03 PROCEDURE — 3079F DIAST BP 80-89 MM HG: CPT | Mod: CPTII,S$GLB,, | Performed by: SURGERY

## 2019-04-03 PROCEDURE — 99213 PR OFFICE/OUTPT VISIT, EST, LEVL III, 20-29 MIN: ICD-10-PCS | Mod: S$GLB,,, | Performed by: SURGERY

## 2019-04-03 PROCEDURE — 3008F BODY MASS INDEX DOCD: CPT | Mod: CPTII,S$GLB,, | Performed by: SURGERY

## 2019-04-03 PROCEDURE — 3077F PR MOST RECENT SYSTOLIC BLOOD PRESSURE >= 140 MM HG: ICD-10-PCS | Mod: CPTII,S$GLB,, | Performed by: SURGERY

## 2019-04-03 RX ORDER — DIPHENHYDRAMINE HCL 25 MG
CAPSULE ORAL
COMMUNITY
End: 2021-10-07 | Stop reason: ALTCHOICE

## 2019-04-03 NOTE — LETTER
April 9, 2019      Karina Gomez MD  [address]  [facility] [address]           [facility]  [address]  [facility] [address]  Phone: [phone]  Fax: [phone]          Patient: Leschia T Bastian   MR Number: [id number]   YOB: 1960   Date of Visit: 4/3/2019       Dear Dr. Karina Gomez:    Thank you for referring Leschia Bastian to me for evaluation. Attached you will find relevant portions of my assessment and plan of care.    If you have questions, please do not hesitate to call me. I look forward to following Leschia Bastian along with you.    Sincerely,    Jocelyne Arredondo MD    Enclosure  CC:  No Recipients    If you would like to receive this communication electronically, please contact [email or web address] or [phone] to request more information on [employer] Link access.    For providers and/or their staff who would like to refer a patient to [facility], please contact us through our one-stop-shop provider referral line, [facility], at [phone].    If you feel you have received this communication in error or would no longer like to receive these types of communications, please e-mail [email or web address]

## 2019-04-08 ENCOUNTER — TELEPHONE (OUTPATIENT)
Dept: SURGERY | Facility: CLINIC | Age: 59
End: 2019-04-08

## 2019-04-08 NOTE — TELEPHONE ENCOUNTER
Returned call to patient regarding breast MRI, patient states out of pocket expense will be $978.00 and she is unable to afford this cost, patient would like to know that out of pocket expense for a breast biopsy, will reach out to financial department at this time, pt states she attempted to get FA and did not qualify, will notify MD at this time

## 2019-04-08 NOTE — TELEPHONE ENCOUNTER
----- Message from Jose Dobson sent at 4/8/2019 10:23 AM CDT -----  Reason for call: Pt states she's unable to do MRI on tomorrow because she cannot afford it, Pt calling to speak with Dr. Arredondo or nurse regarding this.        Communication Preference:[phone]    Additional Information:

## 2019-04-10 ENCOUNTER — TELEPHONE (OUTPATIENT)
Dept: SURGERY | Facility: CLINIC | Age: 59
End: 2019-04-10

## 2019-04-10 NOTE — TELEPHONE ENCOUNTER
Left VM with my direct contact information, patient advised to give a return call with any questions or concerns rt punch biopsy in office on 4-18-19

## 2019-04-14 NOTE — TELEPHONE ENCOUNTER
Patient has Bacterial vaginosis.  Plan:  Metrogel Disp one tube, one applicatorful qhs for 5 nights sent to the pharmacy.    Please  patient on vaginitis prevention including :  a. avoiding feminine products such as deoderant soaps, body wash, bubble bath, douches, scented toilet paper, deoderant tampons or pads, feminine wipes, chronic pad use, etc.  b. avoiding other vulvovaginal irritants such as long hot baths, humidity, tight, synthetic clothing, chlorine and sitting around in wet bathing suits  c. wearing cotton underwear, avoiding thong underwear and no underwear to bed  d. taking showers instead of baths and use a hair dryer on cool setting afterwards to dry  e. wearing cotton to exercise and shower immediately after exercise and change clothes  f. using polyurethane condoms without spermicide if sexually active and symptoms are triggered by intercourse       same name as above

## 2019-04-25 ENCOUNTER — TELEPHONE (OUTPATIENT)
Dept: OBSTETRICS AND GYNECOLOGY | Facility: CLINIC | Age: 59
End: 2019-04-25

## 2019-04-25 ENCOUNTER — CLINICAL SUPPORT (OUTPATIENT)
Dept: REHABILITATION | Facility: HOSPITAL | Age: 59
End: 2019-04-25
Payer: COMMERCIAL

## 2019-04-25 DIAGNOSIS — M75.21 BICEPS TENDONITIS ON RIGHT: ICD-10-CM

## 2019-04-25 DIAGNOSIS — M62.81 GENERALIZED MUSCLE WEAKNESS: ICD-10-CM

## 2019-04-25 DIAGNOSIS — M62.89 ABNORMAL INCREASED MUSCLE TIGHTNESS: ICD-10-CM

## 2019-04-25 PROCEDURE — 97163 PT EVAL HIGH COMPLEX 45 MIN: CPT | Mod: PO

## 2019-04-25 NOTE — TELEPHONE ENCOUNTER
----- Message from Zaynab Benitez sent at 4/24/2019  4:35 PM CDT -----  Contact: sister-Sara  Patient sister is calling because the patient have questions regarding her hysterectomy. Patient can be reached at [phone].

## 2019-04-25 NOTE — PATIENT INSTRUCTIONS
Levator Scapula Stretch, Sitting        Sit, one hand tucked under hip on side to be stretched, other hand over top of head. Turn head toward other side and look down. Use hand on head to gently stretch neck in that position. Hold _30__ seconds.  Repeat _2__ times per session. Do __3_ sessions per day.    Copyright © [employer]. All rights reserved.   Side Bend, Sitting        Sit, hand over top of head. Gently pull head to one side. Hold _30__ seconds.  Repeat _2__ times per session. Do __3_ sessions per day.    Copyright © [employer]. All rights reserved.     Scapula Adduction With Pectorals, High         doorframe with palms against frame and arms at 120°. Lean forward and squeeze shoulder blades. Hold _30__ seconds.  Repeat _2__ times per session. Do __3_ sessions per day. one arm at a time! Gently go into the stretch, not to pain    Scapular Retraction (Standing)        With arms at sides, pinch shoulder blades together.  Repeat 10 times per set. Do 1 sets per session. Do 2 sessions per day. Try to do as many times as you can!     [email or web address]     Copyright © [employer]. All rights reserved.       Strengthening: Isometric Abduction        Using wall for resistance, press left arm into ball using light pressure. Hold 2 seconds.  Repeat 10 times per set. Do 1 sets per session. Do 3 sessions per day.     [email or web address]/806     Copyright © [employer]. All rights reserved.       Strengthening: Isometric Flexion        Using wall for resistance, press right fist into ball using light pressure. Hold 2 seconds.  Repeat 10 times per set. Do 1 sets per session. Do 3 sessions per day.     [email or web address]     Copyright © [employer]. All rights reserved.

## 2019-04-25 NOTE — PLAN OF CARE
[facility]  Physical Therapy Initial Evaluation    Name: Leschia T Bastian  Clinic Number: [id number]    Therapy Diagnosis:   Encounter Diagnoses   Name Primary?    Abnormal increased muscle tightness     Generalized muscle weakness     Biceps tendonitis on right      Physician: Christiano Thornton II, MD    Physician Orders: PT Eval and Treat   Medical Diagnosis from Referral: S43.421A (ICD-10-CM) - Sprain of right rotator cuff capsule, initial encounter  Evaluation Date: 4/25/2019  Authorization Period Expiration: 04/25/2019  Plan of Care Expiration: 6/25/19  Visit # / Visits authorized: 1/ 1    Time In: 5:10  Time Out: 6:00  Total Billable Time: 50 minutes    Precautions: Standard    Subjective   Date of onset: a little over a month  History of current condition - Leschia reports: went to the MD on 3/19/2019 for extreme shoulder pain, but the arm pain/shoulder pain has been around for 2 years or so. Pt was unable to determine a specific incident that caused onset of symptoms. Was given ketorolac injection 30 mg and felt pain relief.       Medical History:   Past Medical History:   Diagnosis Date    Atypical chest pain     Cervical radiculopathy     GERD (gastroesophageal reflux disease)     Hypertension     Lumbar radiculopathy     JAYLON on CPAP     Seasonal allergies     Shortness of breath        Surgical History:   Leschia T Bastian  has a past surgical history that includes Tubal ligation; Gallbladder surgery; Bunionectomy; Axillary hidradenitis excision (Bilateral); Laparoscopic total hysterectomy (N/A, 6/22/2018); Cystoscopy (6/22/2018); Lysis of adhesions of ureter (Right, 6/22/2018); Total abdominal hysterectomy w/ bilateral salpingoophorectomy (Bilateral, 6/22/2018); and Hysterectomy.    Medications:   Leschia has a current medication list which includes the following prescription(s): albuterol, amlodipine, diphenhydramine, epinephrine, ergocalciferol, fexofenadine,  hydrochlorothiazide, hydrocortisone, ketoconazole, ketoconazole, loratadine, metronidazole, pantoprazole, ranitidine, and triamcinolone acetonide 0.1%.    Allergies:   Review of patient's allergies indicates:   Allergen Reactions    Clams      Allergy testing confirmed    Codeine Itching    Iodine     Scallops     Shrimp      Peeling shrimp, got a rash, allergy test confirmed    Crab Rash     Throat closed        Imaging, xray 3/19/2019  FINDINGS:  Visualized osseous structures appear unremarkable, with no evidence of recent or healing fracture, lytic destructive process, appreciable glenohumeral arthritic change, or other significant abnormality identified.  No glenohumeral dislocation.  Note is made of some soft tissue calcification adjacent to the greater tuberosity, which, given its position, may lie within the supraspinatus tendon and is consistent with calcific tendinitis.          Prior Therapy: therapy in neck and lumbar spine, >5 years  Social History:  lives with their family, 1 story, 2 step no handrail, step in tub  Occupation:   Prior Level of Function: indep with ADLs and IADLS  Current Level of Function:     Pain:  Current 0/10, worst 5/10, best 0/10   Location: right shoulder   Description: Aching and stabbing pain,   Aggravating Factors: Lifting and reaching up, shampooing area, reaching back  Easing Factors: injection and putting pressure on it, massage    Pts goals: getting better without pain    Objective     Observation: no ovious distress noted    Posture: kypotic and forward head posture      Passive Range of Motion:   Shoulder Left Right   Flexion 175 175   Abduction 180 175   ER seated T4 T2   IR seated T8 T8   ER at 90 120 110   IR 75 75        Upper Extremity Strength   (L) UE (R) UE   Shoulder flexion: 5 4-   Shoulder Abduction: 5 4-   Shoulder ER 5 3+   Shoulder IR 5 4-   Lower Trap 5 2+   Middle Trap 5 2+   Rhomboids 5 2+     Elbow and Cervical all ROM WNL and no pain  noted    Special Tests:  AC Joint Right   AC Joint Compression Test +   Empty Can Test +   Drop Arm test -   Subscaputlaris Lift Off -   Clunk test -   O'carlos's test +   Hawkin's Kenndy +       Joint Mobility: hypomobile thoracic spine in T3-6, pain with R    Palpation: tender to palpation with radiation symptoms in RUE down to elbow: pec major, subscap, supraspinatus, deltoid, lat dorsi, upper trap, levator scap, infraspinatus, bicep tendon    Sensation: intact    Flexibility: tightness in chest and neck    Scapular Control/Dyskinesis:    Normal / Subtle / Obvious  Comments    Left  obvious -    Right  obvious -           CMS Impairment/Limitation/Restriction for FOTO Shoulder Survey    Therapist reviewed FOTO scores for Leschia T Bastian on 4/25/2019.   FOTO documents entered into [employer] - see Media section.    Limitation Score: 50%  Category: Mobility    Current : CK = at least 40% but < 60% impaired, limited or restricted  Goal: CJ = at least 20% but < 40% impaired, limited or restricted  Discharge: NA at this time         TREATMENT   Treatment Time In: 5:55  Treatment Time Out: 6:00  Total Treatment time separate from Evaluation: 5 minutes    Leschia received therapeutic exercises to develop ROM, flexibility and posture for 5 minutes including:  - review of HEP of stretches and postural education  - ice PRN per protocol to reduce pain and swelling    Home Exercises and Patient Education Provided    Education provided:   - stretch and do exercises in pain free range    Written Home Exercises Provided: Patient instructed to cont prior HEP.  Exercises were reviewed and Leschia was able to demonstrate them prior to the end of the session.  Leschia demonstrated good  understanding of the education provided.     See EMR under Patient Instructions for exercises provided 4/25/2019.    Assessment   Leschia is a 58 y.o. female referred to outpatient Physical Therapy with a medical diagnosis of strain of R rotator cuff  "capsule. Pt presents with decreased ROM, mobility, strength, endurance, balance, and increased pain in R shoulder external and internal muscle rotators with a large amount of active trigger points in all above mentioned muscles which radiate "my exact pain" when pressed upon, as well as, acute bicep tendonitis - which impacts on the pt's ability to complete functional task activities safely and return to highest prior level of function.    Pt prognosis is Good.   Pt will benefit from skilled outpatient Physical Therapy to address the deficits stated above and in the chart below, provide pt/family education, and to maximize pt's level of independence.     Plan of care discussed with patient: Yes  Pt's spiritual, cultural and educational needs considered and patient is agreeable to the plan of care and goals as stated below:     Anticipated Barriers for therapy: none at this time    Medical Necessity is demonstrated by the following  History  Co-morbidities and personal factors that may impact the plan of care Co-morbidities:   poor medication/medical compliance    Personal Factors:   no deficits     moderate   Examination  Body Structures and Functions, activity limitations and participation restrictions that may impact the plan of care Body Regions:   neck  upper extremities  trunk    Body Systems:    gross symmetry  ROM  strength  gross coordinated movement  balance  gait  transfers  transitions  motor control    Participation Restrictions:   - reaching and lifting objects    Activity limitations:   Learning and applying knowledge  no deficits    General Tasks and Commands  no deficits    Communication  no deficits    Mobility  no deficits    Self care  no deficits    Domestic Life  shopping  cooking  doing house work (cleaning house, washing dishes, laundry)    Interactions/Relationships  no deficits    Life Areas  no deficits    Community and Social Life  recreation and leisure         high   Clinical " Presentation stable and uncomplicated moderate   Decision Making/ Complexity Score: high     Goals:  Goals:  Short Term Goals: 4 weeks  1. Patient is able to be indep with HEP.  2. Patient reduces pain levels by 1 grade to complete ADLs with decreased symptoms.  3. Pt will be able to reach above 90 deg in abd and flexion without pain for ADLS  4. Pt will increase strength by 1/2 grade in order to complete all functional task activities with increased activity tolerance.   5. Patient able to score less than or equal to 50 on the FOTO placing patient in 50% impaired, limited, or restricted category demonstrating overall decreased neck pain with functional activities    Long Term Goals: 8 weeks  1. Patient is able to be indep with updated HEP.  2. Patient reduces pain levels to 2/10  to complete ADLs with decreased symptoms.  3. Pt will be able to reach overhead (above 90 deg) with 5# with out pain in order to complete ADLs  4. Pt will increase strength by 1 full grade in order to complete functional task activities with increased activity tolerance.  5. Patient able to score less than or equal to 60 on the FOTO placing patient in 40% impaired, limited, or restricted category demonstrating overall decreased neck pain with functional activities      Plan   Plan of care Certification: 4/25/2019 to 6/25/19.    Outpatient Physical Therapy 2 times weekly for 8 weeks to include the following interventions:  Therapeutic exercises to increase ROM, strength and stabilization; joint and soft tissue mobilization with manual therapy techniques to decrease muscle tightness, pain and improve joint mobility; neuromuscular re-education to improve posture, coordination, kinesthetic sense and proprioception, therapeutic activities to improve coordination, strength and function, therapeutic taping to decrease pain, provide support and improve function of BUE(s) and BLEs; modalities such as moist heat, ice, ultrasound and electrical  stimulation to increase circulation, decrease pain and inflammation; dry needling with manual therapy techniques to decrease pain, inflammation and swelling, increase circulation and promote healing process will be considered and utilized as needed.   Pt may be seen by PTA to carry out plan of care as part of Rehab team.      Laureen Ruth, PT

## 2019-05-24 ENCOUNTER — TELEPHONE (OUTPATIENT)
Dept: SURGERY | Facility: CLINIC | Age: 59
End: 2019-05-24

## 2019-05-24 NOTE — TELEPHONE ENCOUNTER
Called patient to reschedule her appt on 6/19 due to Dr. Arredondo being out of town. Patient did not answer, left her a voicemail with my direct number to call back and reschedule.

## 2019-06-07 ENCOUNTER — TELEPHONE (OUTPATIENT)
Dept: SURGERY | Facility: CLINIC | Age: 59
End: 2019-06-07

## 2019-06-07 NOTE — TELEPHONE ENCOUNTER
Called patient and rescheduled her appt from 6/19 to 7/11 due to Dr. Arredondo being out of town. She voiced understanding of new appt date and time. Reminder letter sent.   Detail Level: Simple Detail Level: Zone

## 2019-06-12 RX ORDER — AMLODIPINE BESYLATE 5 MG/1
TABLET ORAL
Qty: 30 TABLET | Refills: 0 | Status: SHIPPED | OUTPATIENT
Start: 2019-06-12 | End: 2019-09-04 | Stop reason: SDUPTHER

## 2019-06-12 RX ORDER — PANTOPRAZOLE SODIUM 40 MG/1
TABLET, DELAYED RELEASE ORAL
Qty: 30 TABLET | Refills: 4 | Status: SHIPPED | OUTPATIENT
Start: 2019-06-12 | End: 2020-07-12 | Stop reason: SDUPTHER

## 2019-09-04 RX ORDER — AMLODIPINE BESYLATE 5 MG/1
TABLET ORAL
Qty: 30 TABLET | Refills: 2 | Status: SHIPPED | OUTPATIENT
Start: 2019-09-04 | End: 2020-08-26

## 2020-02-18 ENCOUNTER — TELEPHONE (OUTPATIENT)
Dept: INTERNAL MEDICINE | Facility: CLINIC | Age: 60
End: 2020-02-18

## 2020-02-18 NOTE — TELEPHONE ENCOUNTER
----- Message from Clarissa Mcneal sent at 2/18/2020  2:28 PM CST -----  Contact: Pt  Pt is requesting a callback at [phone] regarding bumps on her head that will come and go and need to know if she can get something called in for it or maybe can she just speak with her to see what to do

## 2020-03-26 ENCOUNTER — TELEPHONE (OUTPATIENT)
Dept: INTERNAL MEDICINE | Facility: CLINIC | Age: 60
End: 2020-03-26

## 2020-03-26 DIAGNOSIS — E55.9 MILD VITAMIN D DEFICIENCY: Primary | ICD-10-CM

## 2020-03-26 DIAGNOSIS — I10 ESSENTIAL HYPERTENSION: Chronic | ICD-10-CM

## 2020-03-26 DIAGNOSIS — E83.52 SERUM CALCIUM ELEVATED: ICD-10-CM

## 2020-03-26 RX ORDER — ERGOCALCIFEROL 1.25 MG/1
50000 CAPSULE ORAL
Qty: 3 CAPSULE | Refills: 4 | Status: SHIPPED | OUTPATIENT
Start: 2020-03-26 | End: 2021-04-08 | Stop reason: SDUPTHER

## 2020-03-26 NOTE — TELEPHONE ENCOUNTER
Pt reschedule appt next week to 5/13/20. Pt want annual labs chris at [facility] ([phone]) on 5/6/20 at 8am. Pt also wants vitamin d refilled

## 2020-04-07 ENCOUNTER — TELEPHONE (OUTPATIENT)
Dept: INTERNAL MEDICINE | Facility: CLINIC | Age: 60
End: 2020-04-07

## 2020-04-07 RX ORDER — FLUTICASONE PROPIONATE 50 MCG
1 SPRAY, SUSPENSION (ML) NASAL 2 TIMES DAILY
Qty: 18.2 ML | Refills: 0 | Status: SHIPPED | OUTPATIENT
Start: 2020-04-07 | End: 2023-05-19

## 2020-04-14 ENCOUNTER — TELEPHONE (OUTPATIENT)
Dept: OBSTETRICS AND GYNECOLOGY | Facility: CLINIC | Age: 60
End: 2020-04-14

## 2020-04-14 NOTE — TELEPHONE ENCOUNTER
----- Message from Naa Singh sent at 4/14/2020  3:38 PM CDT -----  Contact: BASTIAN,LESCHIA T [[id number]]  Name of Who is Calling : BASTIAN,LESCHIA T [[id number]]    Patient is requesting a call from staff in regards to her having pain and swelling in the area of her ovaries patient states she had a hysterectomy and was concerned would like to speak with staff as soon as possible  .....Please contact to further discuss and advise.    Can the clinic reply by [facility] : No    What Number to Call Back :  [phone]

## 2020-04-15 ENCOUNTER — TELEPHONE (OUTPATIENT)
Dept: OBSTETRICS AND GYNECOLOGY | Facility: CLINIC | Age: 60
End: 2020-04-15

## 2020-04-15 NOTE — TELEPHONE ENCOUNTER
----- Message from Ivette Shepherd sent at 4/15/2020  9:31 AM CDT -----  Contact: self  Patient is wishing to speak with staff regarding her appointment. She did not disclose any other information. Patient can be reached at [phone].

## 2020-04-16 ENCOUNTER — PATIENT OUTREACH (OUTPATIENT)
Dept: ADMINISTRATIVE | Facility: OTHER | Age: 60
End: 2020-04-16

## 2020-04-16 ENCOUNTER — OFFICE VISIT (OUTPATIENT)
Dept: OBSTETRICS AND GYNECOLOGY | Facility: CLINIC | Age: 60
End: 2020-04-16
Payer: COMMERCIAL

## 2020-04-16 VITALS
BODY MASS INDEX: 27.11 KG/M2 | SYSTOLIC BLOOD PRESSURE: 120 MMHG | DIASTOLIC BLOOD PRESSURE: 80 MMHG | HEIGHT: 69 IN | WEIGHT: 183 LBS

## 2020-04-16 DIAGNOSIS — Z12.31 ENCOUNTER FOR SCREENING MAMMOGRAM FOR MALIGNANT NEOPLASM OF BREAST: Primary | ICD-10-CM

## 2020-04-16 DIAGNOSIS — M62.81 GENERALIZED MUSCLE WEAKNESS: ICD-10-CM

## 2020-04-16 DIAGNOSIS — Z90.710 S/P TAH (TOTAL ABDOMINAL HYSTERECTOMY): ICD-10-CM

## 2020-04-16 DIAGNOSIS — R10.9 ABDOMINAL PAIN, UNSPECIFIED ABDOMINAL LOCATION: ICD-10-CM

## 2020-04-16 DIAGNOSIS — I10 ESSENTIAL HYPERTENSION: Primary | Chronic | ICD-10-CM

## 2020-04-16 PROCEDURE — 99213 PR OFFICE/OUTPT VISIT, EST, LEVL III, 20-29 MIN: ICD-10-PCS | Mod: S$GLB,,, | Performed by: OBSTETRICS & GYNECOLOGY

## 2020-04-16 PROCEDURE — 3074F PR MOST RECENT SYSTOLIC BLOOD PRESSURE < 130 MM HG: ICD-10-PCS | Mod: CPTII,S$GLB,, | Performed by: OBSTETRICS & GYNECOLOGY

## 2020-04-16 PROCEDURE — 99213 OFFICE O/P EST LOW 20 MIN: CPT | Mod: S$GLB,,, | Performed by: OBSTETRICS & GYNECOLOGY

## 2020-04-16 PROCEDURE — 3008F BODY MASS INDEX DOCD: CPT | Mod: CPTII,S$GLB,, | Performed by: OBSTETRICS & GYNECOLOGY

## 2020-04-16 PROCEDURE — 3074F SYST BP LT 130 MM HG: CPT | Mod: CPTII,S$GLB,, | Performed by: OBSTETRICS & GYNECOLOGY

## 2020-04-16 PROCEDURE — 99999 PR PBB SHADOW E&M-EST. PATIENT-LVL II: ICD-10-PCS | Mod: PBBFAC,,, | Performed by: OBSTETRICS & GYNECOLOGY

## 2020-04-16 PROCEDURE — 3079F PR MOST RECENT DIASTOLIC BLOOD PRESSURE 80-89 MM HG: ICD-10-PCS | Mod: CPTII,S$GLB,, | Performed by: OBSTETRICS & GYNECOLOGY

## 2020-04-16 PROCEDURE — 3008F PR BODY MASS INDEX (BMI) DOCUMENTED: ICD-10-PCS | Mod: CPTII,S$GLB,, | Performed by: OBSTETRICS & GYNECOLOGY

## 2020-04-16 PROCEDURE — 99999 PR PBB SHADOW E&M-EST. PATIENT-LVL II: CPT | Mod: PBBFAC,,, | Performed by: OBSTETRICS & GYNECOLOGY

## 2020-04-16 PROCEDURE — 3079F DIAST BP 80-89 MM HG: CPT | Mod: CPTII,S$GLB,, | Performed by: OBSTETRICS & GYNECOLOGY

## 2020-04-16 NOTE — PROGRESS NOTES
Care Everywhere: updated  Immunization: updated  Health Maintenance: updated  Media Review: reviewed for possible outside mammogram   Legacy Review: n/a  Order placed: mammogram   Upcoming appts:n/a

## 2020-04-20 NOTE — PROGRESS NOTES
HISTORY OF PRESENT ILLNESS:    Leschia T Bastian is a 59 y.o. female  Patient's last menstrual period was 2018. presents today complaining of pelvic pain.   Patient reports lower abdominal pelvic pain in the left lower quadrant intermittent in nature described as dull aching.     Past Medical History:   Diagnosis Date    Atypical chest pain     Cervical radiculopathy     GERD (gastroesophageal reflux disease)     Hypertension     Lumbar radiculopathy     JAYLON on CPAP     Seasonal allergies     Shortness of breath        Past Surgical History:   Procedure Laterality Date    AXILLARY HIDRADENITIS EXCISION Bilateral     BUNIONECTOMY      CYSTOSCOPY  2018    Procedure: CYSTOSCOPY;  Surgeon: Best Jefferson MD;  Location: [facility];  Service: Urology;;    GALLBLADDER SURGERY      HYSTERECTOMY      LAPAROSCOPIC TOTAL HYSTERECTOMY N/A 2018    Procedure: HYSTERECTOMY, TOTAL, LAPAROSCOPIC, ATTEMPTED;  Surgeon: Nidia Bradley MD;  Location: [facility];  Service: OB/GYN;  Laterality: N/A;  attempted    LYSIS OF ADHESIONS OF URETER Right 2018    Procedure: URETEROLYSIS;  Surgeon: Best Jefferson MD;  Location: [facility];  Service: Urology;  Laterality: Right;    TOTAL ABDOMINAL HYSTERECTOMY W/ BILATERAL SALPINGOOPHORECTOMY Bilateral 2018    TUBAL LIGATION         MEDICATIONS AND ALLERGIES:      Current Outpatient Medications:     amLODIPine (NORVASC) 5 MG tablet, TAKE ONE TABLET BY MOUTH EVERY DAY (STOP LOSARTAN), Disp: 30 tablet, Rfl: 2    diphenhydrAMINE (BENADRYL) 25 mg capsule, 1 tablet, Disp: , Rfl:     EPINEPHrine (EPIPEN) 0.3 mg/0.3 mL AtIn, one injection, Disp: , Rfl:     ergocalciferol (ERGOCALCIFEROL) 50,000 unit Cap, Take 1 capsule (50,000 Units total) by mouth every 30 days., Disp: 3 capsule, Rfl: 4    fexofenadine (ALLER-EASE) 180 MG tablet, Take 1 tablet (180 mg total) by mouth once daily., Disp: 30 tablet, Rfl: 2    fluticasone propionate (FLONASE) 50  mcg/actuation nasal spray, 1 spray (50 mcg total) by Each Nostril route 2 (two) times daily., Disp: 18.2 mL, Rfl: 0    hydroCHLOROthiazide (MICROZIDE) 12.5 mg capsule, Take 1 capsule (12.5 mg total) by mouth once daily., Disp: 90 capsule, Rfl: 3    hydrocortisone 2.5 % cream, , Disp: , Rfl:     ketoconazole (NIZORAL) 2 % cream, , Disp: , Rfl:     ketoconazole (NIZORAL) 2 % shampoo, , Disp: , Rfl:     loratadine (CLARITIN) 10 mg tablet, once daily. , Disp: , Rfl:     pantoprazole (PROTONIX) 40 MG tablet, TAKE ONE TABLET BY MOUTH EVERY DAY, Disp: 30 tablet, Rfl: 4    ranitidine (ZANTAC) 300 MG tablet, Take 300 mg by mouth nightly., Disp: , Rfl: 12    triamcinolone acetonide 0.1% (KENALOG) 0.1 % cream, , Disp: , Rfl:     albuterol 90 mcg/actuation inhaler, Inhale 2 puffs into the lungs every 6 (six) hours as needed for Wheezing. Rescue, Disp: 18 g, Rfl: 1    metroNIDAZOLE (METROGEL) 0.75 % vaginal gel, INSERT ONE applicator VAGINALLY AT BEDTIME FOR FIVE nights, Disp: , Rfl: 0    Review of patient's allergies indicates:   Allergen Reactions    Clams      Allergy testing confirmed    Codeine Itching    Iodine     Scallops     Shrimp      Peeling shrimp, got a rash, allergy test confirmed    Crab Rash     Throat closed       COMPREHENSIVE GYN HISTORY:  PAP History: Denies abnormal Paps.  Infection History: Denies STDs. Denies PID.  Benign History: Denies uterine fibroids. Denies ovarian cysts. Denies endometriosis. Denies other conditions.  Cancer History: Denies cervical cancer. Denies uterine cancer or hyperplasia. Denies ovarian cancer. Denies vulvar cancer or pre-cancer. Denies vaginal cancer or pre-cancer. Denies breast cancer. Denies colon cancer.  Sexual Activity History: Reports currently being sexually active  Menstrual History: Every 28 days, flows for 4 days. Light flow.  Dysmenorrhea History: Denies dysmenorrhea.  Contraception History:      ROS:  GENERAL: No fever or chills.  BREASTS: No pain.  No lumps. No discharge.  ABDOMEN: No pain. No nausea. No vomiting. No diarrhea. No constipation.  REPRODUCTIVE: No abnormal bleeding.   VULVA: No pain. No lesions. No itching.  VAGINA: No relaxation. No itching. No odor. No discharge. No lesions.  URINARY: No incontinence. No nocturia. No frequency. No dysuria.    PE:  APPEARANCE: Well nourished, well developed, in no acute distress.  AFFECT: WNL, alert and oriented x 3.  ABDOMEN: Soft. No tenderness or masses. No hepatosplenomegaly. No hernias.  PELVIC: Normal external female genitalia without lesions. Normal hair distribution. Adequate perineal body, normal urethral meatus. Vagina pink and well rugated without lesions or discharge. Cervix pink without lesions, discharge or tenderness. No significant cystocele or rectocele. Bimanual exam shows uterus to be 4-6 weeks size, regular, mobile and nontender. Adnexa without masses or tenderness.      1. Essential hypertension    2. S/P ROLDAN-BSO    3. Generalized muscle weakness    4. Abdominal pain, unspecified abdominal location        FOLLOW-UP with me in 4 weeks   Needs evaluation by PCP

## 2020-05-07 ENCOUNTER — HOSPITAL ENCOUNTER (OUTPATIENT)
Dept: RADIOLOGY | Facility: HOSPITAL | Age: 60
Discharge: HOME | End: 2020-05-07
Attending: INTERNAL MEDICINE
Payer: COMMERCIAL

## 2020-05-07 DIAGNOSIS — Z12.31 ENCOUNTER FOR SCREENING MAMMOGRAM FOR MALIGNANT NEOPLASM OF BREAST: ICD-10-CM

## 2020-05-07 PROCEDURE — 77067 SCR MAMMO BI INCL CAD: CPT | Mod: TC,PO

## 2020-05-13 ENCOUNTER — TELEPHONE (OUTPATIENT)
Dept: INTERNAL MEDICINE | Facility: CLINIC | Age: 60
End: 2020-05-13

## 2020-05-13 NOTE — TELEPHONE ENCOUNTER
----- Message from Humera Zhong sent at 5/13/2020  4:23 PM CDT -----  Contact: Patient [phone]  Requesting to speak with you regarding her lab results from 05/07/2020.    Please call and advise.    Thank You

## 2020-06-01 ENCOUNTER — OFFICE VISIT (OUTPATIENT)
Dept: INTERNAL MEDICINE | Facility: CLINIC | Age: 60
End: 2020-06-01
Payer: COMMERCIAL

## 2020-06-01 ENCOUNTER — LAB VISIT (OUTPATIENT)
Dept: LAB | Facility: HOSPITAL | Age: 60
End: 2020-06-01
Attending: INTERNAL MEDICINE
Payer: COMMERCIAL

## 2020-06-01 VITALS
HEIGHT: 69 IN | HEART RATE: 71 BPM | DIASTOLIC BLOOD PRESSURE: 88 MMHG | SYSTOLIC BLOOD PRESSURE: 122 MMHG | WEIGHT: 187.63 LBS | OXYGEN SATURATION: 99 % | BODY MASS INDEX: 27.79 KG/M2

## 2020-06-01 DIAGNOSIS — R05.9 COUGH: ICD-10-CM

## 2020-06-01 DIAGNOSIS — R51.9 FREQUENT HEADACHES: ICD-10-CM

## 2020-06-01 DIAGNOSIS — Z00.00 PHYSICAL EXAM: Primary | ICD-10-CM

## 2020-06-01 DIAGNOSIS — L65.9 HAIR LOSS: ICD-10-CM

## 2020-06-01 DIAGNOSIS — R74.8 ELEVATED LIVER ENZYMES: ICD-10-CM

## 2020-06-01 DIAGNOSIS — Z12.11 COLON CANCER SCREENING: ICD-10-CM

## 2020-06-01 DIAGNOSIS — R06.02 SHORTNESS OF BREATH: ICD-10-CM

## 2020-06-01 DIAGNOSIS — Z00.00 WELLNESS EXAMINATION: ICD-10-CM

## 2020-06-01 DIAGNOSIS — T78.40XS ALLERGIC STATE, SEQUELA: ICD-10-CM

## 2020-06-01 LAB
ALBUMIN SERPL BCP-MCNC: 4.4 G/DL (ref 3.5–5.2)
ALP SERPL-CCNC: 105 U/L (ref 55–135)
ALT SERPL W/O P-5'-P-CCNC: 42 U/L (ref 10–44)
ANION GAP SERPL CALC-SCNC: 9 MMOL/L (ref 8–16)
AST SERPL-CCNC: 27 U/L (ref 10–40)
BILIRUB SERPL-MCNC: 0.8 MG/DL (ref 0.1–1)
BUN SERPL-MCNC: 15 MG/DL (ref 6–20)
CALCIUM SERPL-MCNC: 10.3 MG/DL (ref 8.7–10.5)
CHLORIDE SERPL-SCNC: 106 MMOL/L (ref 95–110)
CO2 SERPL-SCNC: 26 MMOL/L (ref 23–29)
CREAT SERPL-MCNC: 0.8 MG/DL (ref 0.5–1.4)
EST. GFR  (AFRICAN AMERICAN): >60 ML/MIN/1.73 M^2
EST. GFR  (NON AFRICAN AMERICAN): >60 ML/MIN/1.73 M^2
FERRITIN SERPL-MCNC: 158 NG/ML (ref 20–300)
GLUCOSE SERPL-MCNC: 97 MG/DL (ref 70–110)
IRON SERPL-MCNC: 97 UG/DL (ref 30–160)
POTASSIUM SERPL-SCNC: 4.4 MMOL/L (ref 3.5–5.1)
PROT SERPL-MCNC: 7.8 G/DL (ref 6–8.4)
SATURATED IRON: 22 % (ref 20–50)
SODIUM SERPL-SCNC: 141 MMOL/L (ref 136–145)
TOTAL IRON BINDING CAPACITY: 443 UG/DL (ref 250–450)
TRANSFERRIN SERPL-MCNC: 299 MG/DL (ref 200–375)
TSH SERPL DL<=0.005 MIU/L-ACNC: 1.11 UIU/ML (ref 0.4–4)

## 2020-06-01 PROCEDURE — 3079F PR MOST RECENT DIASTOLIC BLOOD PRESSURE 80-89 MM HG: ICD-10-PCS | Mod: CPTII,S$GLB,, | Performed by: INTERNAL MEDICINE

## 2020-06-01 PROCEDURE — 99396 PR PREVENTIVE VISIT,EST,40-64: ICD-10-PCS | Mod: S$GLB,,, | Performed by: INTERNAL MEDICINE

## 2020-06-01 PROCEDURE — 83540 ASSAY OF IRON: CPT

## 2020-06-01 PROCEDURE — 84443 ASSAY THYROID STIM HORMONE: CPT

## 2020-06-01 PROCEDURE — 36415 COLL VENOUS BLD VENIPUNCTURE: CPT

## 2020-06-01 PROCEDURE — 99396 PREV VISIT EST AGE 40-64: CPT | Mod: S$GLB,,, | Performed by: INTERNAL MEDICINE

## 2020-06-01 PROCEDURE — 3074F PR MOST RECENT SYSTOLIC BLOOD PRESSURE < 130 MM HG: ICD-10-PCS | Mod: CPTII,S$GLB,, | Performed by: INTERNAL MEDICINE

## 2020-06-01 PROCEDURE — 99999 PR PBB SHADOW E&M-EST. PATIENT-LVL IV: CPT | Mod: PBBFAC,,, | Performed by: INTERNAL MEDICINE

## 2020-06-01 PROCEDURE — 3079F DIAST BP 80-89 MM HG: CPT | Mod: CPTII,S$GLB,, | Performed by: INTERNAL MEDICINE

## 2020-06-01 PROCEDURE — 80053 COMPREHEN METABOLIC PANEL: CPT

## 2020-06-01 PROCEDURE — 82728 ASSAY OF FERRITIN: CPT

## 2020-06-01 PROCEDURE — 99999 PR PBB SHADOW E&M-EST. PATIENT-LVL IV: ICD-10-PCS | Mod: PBBFAC,,, | Performed by: INTERNAL MEDICINE

## 2020-06-01 PROCEDURE — 3074F SYST BP LT 130 MM HG: CPT | Mod: CPTII,S$GLB,, | Performed by: INTERNAL MEDICINE

## 2020-06-01 RX ORDER — ALBUTEROL SULFATE 90 UG/1
2 AEROSOL, METERED RESPIRATORY (INHALATION) EVERY 6 HOURS PRN
Qty: 18 G | Refills: 2 | Status: SHIPPED | OUTPATIENT
Start: 2020-06-01 | End: 2022-04-13

## 2020-06-01 RX ORDER — EPINEPHRINE 0.3 MG/.3ML
INJECTION SUBCUTANEOUS
Qty: 1 DEVICE | Refills: 1 | Status: SHIPPED | OUTPATIENT
Start: 2020-06-01 | End: 2020-07-22 | Stop reason: SDUPTHER

## 2020-06-01 NOTE — PATIENT INSTRUCTIONS
Mucinex thins mucus: 1200 plain and decide if I need it, and if I need it do I need it once or twice a day.     If your symptoms are worse in the am then one flonase at bedtime.    [employer] Exercise Plans for Physical Fitness    2000 units a day of D3 daily    Hair expert : Dr. Tiffanie Guevara

## 2020-06-01 NOTE — PROGRESS NOTES
Subjective:      Patient ID: Leschia T Bastian is a 59 y.o. female.    Chief Complaint: Annual Exam    HPI:  HPI   Patient is here for her annual exam:  Lab:  Labs were done prior to the appointment.  A concern is that the AST ALT is elevated.  This has occurred in the past and I have suggested to the patient that we repeat this.    Patient tells me that she is having hair loss.  Her hemoglobin is stable.  Her MCV is low and we will check her iron.  Also the suggestion is that we check her thyroid.  I also gave her the name of a dermatologist to help with hair loss      Patient had a hysterectomy last year.    Patient has had a cholecystectomy    Patient would like Epipen and Inhaler renewed from her Allergist     Annual exam: 6/1/2020  Colonoscopy 11/29/2010: history of colon polyps: 7/21/2015 follow up 5 years (brother has colon cancer) Orders  Mammogram: mammo: 5/7/2020 8/2019 MRI due now  3 members of  immediate family with breast cancer  Gyn: yearly : Dr. Bradley  Optho:yearly outside  Flu: yearly  Tetanus: declines  Shingrix discussed  Pneumovax: not due  Prevnar: not due     Labs  Vit D : use D3 over the counter also  200o units a day  Elevated liver        Patient Active Problem List   Diagnosis    Essential hypertension    GERD (gastroesophageal reflux disease)    Obstructive sleep apnea treated with continuous positive airway pressure (CPAP)    Mild vitamin D deficiency    Shortness of breath    WELLS (dyspnea on exertion)    S/P ROLDAN-BSO    Abnormal increased muscle tightness    Generalized muscle weakness    Biceps tendonitis on right     Past Medical History:   Diagnosis Date    Atypical chest pain     Cervical radiculopathy     GERD (gastroesophageal reflux disease)     Hypertension     Lumbar radiculopathy     JAYLON on CPAP     Seasonal allergies     Shortness of breath      Past Surgical History:   Procedure Laterality Date    AXILLARY HIDRADENITIS EXCISION Bilateral     BUNIONECTOMY   "    CYSTOSCOPY  6/22/2018    Procedure: CYSTOSCOPY;  Surgeon: Best Jefferson MD;  Location: [facility];  Service: Urology;;    GALLBLADDER SURGERY      HYSTERECTOMY      LAPAROSCOPIC TOTAL HYSTERECTOMY N/A 6/22/2018    Procedure: HYSTERECTOMY, TOTAL, LAPAROSCOPIC, ATTEMPTED;  Surgeon: Nidia Bradley MD;  Location: [facility];  Service: OB/GYN;  Laterality: N/A;  attempted    LYSIS OF ADHESIONS OF URETER Right 6/22/2018    Procedure: URETEROLYSIS;  Surgeon: Best Jefferson MD;  Location: [facility];  Service: Urology;  Laterality: Right;    TOTAL ABDOMINAL HYSTERECTOMY W/ BILATERAL SALPINGOOPHORECTOMY Bilateral 6/22/2018    TUBAL LIGATION       Family History   Problem Relation Age of Onset    Breast cancer Mother 53    Cancer Mother         breast cancer    Hypertension Mother     Ovarian cancer Cousin     Cancer Cousin         ovarian    Hypertension Sister     Breast cancer Sister 52    Glaucoma Father     Breast cancer Maternal Cousin     Breast cancer Maternal Cousin     Heart disease Maternal Grandfather     Heart disease Paternal Grandfather     Colon cancer Brother 46     Review of Systems   Constitutional: Negative for chills, fatigue, fever and unexpected weight change.   HENT: Negative for trouble swallowing.    Respiratory: Negative for cough, shortness of breath and wheezing.    Cardiovascular: Negative for chest pain, palpitations and leg swelling.   Gastrointestinal: Negative for abdominal distention, abdominal pain, blood in stool and vomiting.     Objective:     Vitals:    06/01/20 0800   BP: 122/88   Pulse: 71   SpO2: 99%   Weight: 85.1 kg (187 lb 9.8 oz)   Height: 5' 9" (1.753 m)   PainSc: 0-No pain     Body mass index is 27.71 kg/m².  Physical Exam   Constitutional: She is oriented to person, place, and time. She appears well-developed and well-nourished. No distress.   Neck: Carotid bruit is not present. No thyromegaly present.   Cardiovascular: Normal rate, regular rhythm and " normal heart sounds. PMI is not displaced.   Pulmonary/Chest: Effort normal and breath sounds normal. No respiratory distress.   Abdominal: Soft. Bowel sounds are normal. She exhibits no distension. There is no tenderness.   Musculoskeletal: She exhibits no edema.   Neurological: She is alert and oriented to person, place, and time.      Breast exam was completed: no mass on the right breast or left breast  Assessment:     1. Physical exam    2. Cough    3. Wellness examination    4. Frequent headaches    5. Shortness of breath    6. Allergic state, sequela    7. Colon cancer screening    8. Hair loss    9. Elevated liver enzymes      Plan:   Leschia was seen today for annual exam.    Diagnoses and all orders for this visit:    Physical exam  Comments:  No new findings on exam will need to follow-up on lab and hair loss    Cough  Comments:  Possibly bronchitis  Orders:  -     X-Ray Chest PA And Lateral; Future    Wellness examination    Frequent headaches  Comments:  Now improved    Shortness of breath  Comments:  Renew albuterol  Orders:  -     albuterol (PROVENTIL/VENTOLIN HFA) 90 mcg/actuation inhaler; Inhale 2 puffs into the lungs every 6 (six) hours as needed for Wheezing. Rescue    Allergic state, sequela  Comments:  Renew EpiPen  Orders:  -     EPINEPHrine (EPIPEN) 0.3 mg/0.3 mL AtIn; one injection    Colon cancer screening  -     Case request GI: COLONOSCOPY    Hair loss  -     TSH; Future  -     Iron and TIBC; Future  -     Ferritin; Future    Elevated liver enzymes  -     Comprehensive metabolic panel; Future      Mucinex thins mucus: 1200 plain and decide if I need it, and if I need it do I need it once or twice a day.     If your symptoms are worse in the am then one flonase at bedtime.    [employer]    [employer]    Hair expert : Dr. Tiffanie Guevara    Problem List Items Addressed This Visit     Shortness  of breath    Relevant Medications    albuterol (PROVENTIL/VENTOLIN HFA) 90 mcg/actuation inhaler      Other Visit Diagnoses     Physical exam    -  Primary    No new findings on exam will need to follow-up on lab and hair loss    Cough        Possibly bronchitis    Relevant Orders    X-Ray Chest PA And Lateral    Wellness examination        Frequent headaches        Now improved    Allergic state, sequela        Renew EpiPen    Relevant Medications    EPINEPHrine (EPIPEN) 0.3 mg/0.3 mL AtIn    Colon cancer screening        Relevant Orders    Case request GI: COLONOSCOPY (Completed)    Hair loss        Relevant Orders    TSH    Iron and TIBC    Ferritin    Elevated liver enzymes        Relevant Orders    Comprehensive metabolic panel        Orders Placed This Encounter   Procedures    X-Ray Chest PA And Lateral     Standing Status:   Future     Standing Expiration Date:   7/31/2020    Comprehensive metabolic panel     Standing Status:   Future     Number of Occurrences:   1     Standing Expiration Date:   7/31/2020    TSH     Standing Status:   Future     Number of Occurrences:   1     Standing Expiration Date:   7/31/2020    Iron and TIBC     Standing Status:   Future     Number of Occurrences:   1     Standing Expiration Date:   6/1/2021    Ferritin     Standing Status:   Future     Number of Occurrences:   1     Standing Expiration Date:   7/31/2021    Case request GI: COLONOSCOPY     Order Specific Question:   Pre-op Diagnosis     Answer:   Screening [967176]     Order Specific Question:   CPT Code:     Answer:   IN COLORECTAL CANCER SCREEN RESULTS DOCUMENT/REVIEW [3017F]     Order Specific Question:   Case Referring Provider     Answer:   TESSIE MILLER [[id number]]     Order Specific Question:   CPT Code:     Answer:   IN COLON CA SCRN NOT HI RSK IND []     Order Specific Question:   Medical Necessity:     Answer:   Medically Non-Urgent [100]     Order Specific Question:   Is an on-site pathologist  required for this procedure?     Answer:   N/A     Follow up in about 1 year (around 6/1/2021) for Annual exam.     Medication List           Accurate as of June 1, 2020  9:37 AM. If you have any questions, ask your nurse or doctor.               CHANGE how you take these medications    hydroCHLOROthiazide 12.5 mg capsule  Commonly known as:  MICROZIDE  Take 1 capsule (12.5 mg total) by mouth once daily.  What changed:    · when to take this  · reasons to take this        CONTINUE taking these medications    albuterol 90 mcg/actuation inhaler  Commonly known as:  PROVENTIL/VENTOLIN HFA  Inhale 2 puffs into the lungs every 6 (six) hours as needed for Wheezing. Rescue     amLODIPine 5 MG tablet  Commonly known as:  NORVASC  TAKE ONE TABLET BY MOUTH EVERY DAY (STOP LOSARTAN)     BenadryL 25 mg capsule  Generic drug:  diphenhydrAMINE     EPINEPHrine 0.3 mg/0.3 mL Atin  Commonly known as:  EPIPEN  one injection     ergocalciferol 50,000 unit Cap  Commonly known as:  ERGOCALCIFEROL  Take 1 capsule (50,000 Units total) by mouth every 30 days.     fluticasone propionate 50 mcg/actuation nasal spray  Commonly known as:  FLONASE  1 spray (50 mcg total) by Each Nostril route 2 (two) times daily.     hydrocortisone 2.5 % cream     * ketoconazole 2 % cream  Commonly known as:  NIZORAL     * ketoconazole 2 % shampoo  Commonly known as:  NIZORAL     loratadine 10 mg tablet  Commonly known as:  CLARITIN     pantoprazole 40 MG tablet  Commonly known as:  PROTONIX  TAKE ONE TABLET BY MOUTH EVERY DAY     triamcinolone acetonide 0.1% 0.1 % cream  Commonly known as:  KENALOG         * This list has 2 medication(s) that are the same as other medications prescribed for you. Read the directions carefully, and ask your doctor or other care provider to review them with you.               Where to Get Your Medications      These medications were sent to [facility] [place] - [address] [address]  [address] [address] [place]  [address]    Phone:  [phone]   · albuterol 90 mcg/actuation inhaler  · EPINEPHrine 0.3 mg/0.3 mL Atin

## 2020-06-11 ENCOUNTER — HOSPITAL ENCOUNTER (OUTPATIENT)
Dept: RADIOLOGY | Facility: HOSPITAL | Age: 60
Discharge: HOME | End: 2020-06-11
Attending: INTERNAL MEDICINE
Payer: COMMERCIAL

## 2020-06-11 DIAGNOSIS — R05.9 COUGH: ICD-10-CM

## 2020-06-11 PROCEDURE — 71046 X-RAY EXAM CHEST 2 VIEWS: CPT | Mod: TC,FY,PO

## 2020-07-22 ENCOUNTER — OFFICE VISIT (OUTPATIENT)
Dept: INTERNAL MEDICINE | Facility: CLINIC | Age: 60
End: 2020-07-22
Payer: COMMERCIAL

## 2020-07-22 VITALS
HEART RATE: 87 BPM | SYSTOLIC BLOOD PRESSURE: 130 MMHG | BODY MASS INDEX: 28.67 KG/M2 | OXYGEN SATURATION: 98 % | DIASTOLIC BLOOD PRESSURE: 70 MMHG | WEIGHT: 193.56 LBS | HEIGHT: 69 IN

## 2020-07-22 DIAGNOSIS — T78.40XS ALLERGIC STATE, SEQUELA: ICD-10-CM

## 2020-07-22 DIAGNOSIS — L50.9 HIVES: Primary | ICD-10-CM

## 2020-07-22 PROCEDURE — 3078F PR MOST RECENT DIASTOLIC BLOOD PRESSURE < 80 MM HG: ICD-10-PCS | Mod: CPTII,S$GLB,, | Performed by: INTERNAL MEDICINE

## 2020-07-22 PROCEDURE — 3075F PR MOST RECENT SYSTOLIC BLOOD PRESS GE 130-139MM HG: ICD-10-PCS | Mod: CPTII,S$GLB,, | Performed by: INTERNAL MEDICINE

## 2020-07-22 PROCEDURE — 3075F SYST BP GE 130 - 139MM HG: CPT | Mod: CPTII,S$GLB,, | Performed by: INTERNAL MEDICINE

## 2020-07-22 PROCEDURE — 3008F PR BODY MASS INDEX (BMI) DOCUMENTED: ICD-10-PCS | Mod: CPTII,S$GLB,, | Performed by: INTERNAL MEDICINE

## 2020-07-22 PROCEDURE — 99214 OFFICE O/P EST MOD 30 MIN: CPT | Mod: 25,S$GLB,, | Performed by: INTERNAL MEDICINE

## 2020-07-22 PROCEDURE — 99214 PR OFFICE/OUTPT VISIT, EST, LEVL IV, 30-39 MIN: ICD-10-PCS | Mod: 25,S$GLB,, | Performed by: INTERNAL MEDICINE

## 2020-07-22 PROCEDURE — 3008F BODY MASS INDEX DOCD: CPT | Mod: CPTII,S$GLB,, | Performed by: INTERNAL MEDICINE

## 2020-07-22 PROCEDURE — 96372 PR INJECTION,THERAP/PROPH/DIAG2ST, IM OR SUBCUT: ICD-10-PCS | Mod: S$GLB,,, | Performed by: INTERNAL MEDICINE

## 2020-07-22 PROCEDURE — 96372 THER/PROPH/DIAG INJ SC/IM: CPT | Mod: S$GLB,,, | Performed by: INTERNAL MEDICINE

## 2020-07-22 PROCEDURE — 99999 PR PBB SHADOW E&M-EST. PATIENT-LVL V: CPT | Mod: PBBFAC,,, | Performed by: INTERNAL MEDICINE

## 2020-07-22 PROCEDURE — 99999 PR PBB SHADOW E&M-EST. PATIENT-LVL V: ICD-10-PCS | Mod: PBBFAC,,, | Performed by: INTERNAL MEDICINE

## 2020-07-22 PROCEDURE — 3078F DIAST BP <80 MM HG: CPT | Mod: CPTII,S$GLB,, | Performed by: INTERNAL MEDICINE

## 2020-07-22 RX ORDER — EPINEPHRINE 0.3 MG/.3ML
INJECTION SUBCUTANEOUS
Qty: 2 EACH | Refills: 1 | Status: SHIPPED | OUTPATIENT
Start: 2020-07-22 | End: 2022-04-13

## 2020-07-22 RX ORDER — LORATADINE 10 MG/1
10 TABLET ORAL DAILY
Qty: 30 TABLET | Refills: 1 | Status: SHIPPED | OUTPATIENT
Start: 2020-07-22 | End: 2021-03-04 | Stop reason: SDUPTHER

## 2020-07-22 RX ORDER — BETAMETHASONE SODIUM PHOSPHATE AND BETAMETHASONE ACETATE 3; 3 MG/ML; MG/ML
6 INJECTION, SUSPENSION INTRA-ARTICULAR; INTRALESIONAL; INTRAMUSCULAR; SOFT TISSUE ONCE
Qty: 1 ML | Refills: 0 | Status: SHIPPED | OUTPATIENT
Start: 2020-07-22 | End: 2020-07-22

## 2020-07-22 RX ORDER — FAMOTIDINE 40 MG/1
40 TABLET, FILM COATED ORAL DAILY
Qty: 30 TABLET | Refills: 1 | Status: SHIPPED | OUTPATIENT
Start: 2020-07-22 | End: 2021-01-19

## 2020-07-22 RX ORDER — BETAMETHASONE SODIUM PHOSPHATE AND BETAMETHASONE ACETATE 3; 3 MG/ML; MG/ML
6 INJECTION, SUSPENSION INTRA-ARTICULAR; INTRALESIONAL; INTRAMUSCULAR; SOFT TISSUE ONCE
Status: COMPLETED | OUTPATIENT
Start: 2020-07-22 | End: 2020-07-22

## 2020-07-22 RX ADMIN — BETAMETHASONE SODIUM PHOSPHATE AND BETAMETHASONE ACETATE 6 MG: 3; 3 INJECTION, SUSPENSION INTRA-ARTICULAR; INTRALESIONAL; INTRAMUSCULAR; SOFT TISSUE at 03:07

## 2020-07-22 NOTE — PROGRESS NOTES
Subjective:      Patient ID: Leschia T Bastian is a 59 y.o. female.    Chief Complaint: Allergic Reaction    HPI:  HPI   Patients symptoms occurred after lunch 11:30 and 12:30pm: She ate an apple-pecan salad with spinach, blue cheese and pomegranate vinegarette for lunch which she has eaten before. She initially felt a breakout on her scalp and the back of the neck and front of the neck. It then included the arms , back, inguinal area and behind the knees. It is now resolving with the patient taking a benadryl 25 mg at 1:15pm. The last time it happened was 2016 eating boiled crabs that were boiled in shrimp water.    Patient had allergy testing in 2016.  Patient Active Problem List   Diagnosis    Essential hypertension    GERD (gastroesophageal reflux disease)    Obstructive sleep apnea treated with continuous positive airway pressure (CPAP)    Mild vitamin D deficiency    Shortness of breath    WELLS (dyspnea on exertion)    S/P ROLDAN-BSO    Abnormal increased muscle tightness    Generalized muscle weakness    Biceps tendonitis on right     Past Medical History:   Diagnosis Date    Atypical chest pain     Cervical radiculopathy     GERD (gastroesophageal reflux disease)     Hypertension     Lumbar radiculopathy     JAYLON on CPAP     Seasonal allergies     Shortness of breath      Past Surgical History:   Procedure Laterality Date    AXILLARY HIDRADENITIS EXCISION Bilateral     BUNIONECTOMY      CYSTOSCOPY  6/22/2018    Procedure: CYSTOSCOPY;  Surgeon: Best Jefferson MD;  Location: [facility];  Service: Urology;;    GALLBLADDER SURGERY      HYSTERECTOMY      LAPAROSCOPIC TOTAL HYSTERECTOMY N/A 6/22/2018    Procedure: HYSTERECTOMY, TOTAL, LAPAROSCOPIC, ATTEMPTED;  Surgeon: Nidia Bradley MD;  Location: [facility];  Service: OB/GYN;  Laterality: N/A;  attempted    LYSIS OF ADHESIONS OF URETER Right 6/22/2018    Procedure: URETEROLYSIS;  Surgeon: Best Jefferson MD;  Location: [facility];  Service:  "Urology;  Laterality: Right;    TOTAL ABDOMINAL HYSTERECTOMY W/ BILATERAL SALPINGOOPHORECTOMY Bilateral 6/22/2018    TUBAL LIGATION       Family History   Problem Relation Age of Onset    Breast cancer Mother 53    Cancer Mother         breast cancer    Hypertension Mother     Ovarian cancer Cousin     Cancer Cousin         ovarian    Hypertension Sister     Breast cancer Sister 52    Glaucoma Father     Breast cancer Maternal Cousin     Breast cancer Maternal Cousin     Heart disease Maternal Grandfather     Heart disease Paternal Grandfather     Colon cancer Brother 46     Review of Systems   Constitutional: Negative for activity change, chills, fever and unexpected weight change.   Respiratory: Negative for cough, chest tightness, shortness of breath and wheezing.    Cardiovascular: Negative for chest pain, palpitations and leg swelling.     Objective:     Vitals:    07/22/20 1438   BP: 130/70   Pulse: 87   SpO2: 98%   Weight: 87.8 kg (193 lb 9 oz)   Height: 5' 9" (1.753 m)   PainSc: 0-No pain     Body mass index is 28.58 kg/m².  Physical Exam  Constitutional:       Appearance: She is well-developed.   Neck:      Thyroid: No thyromegaly.      Vascular: No JVD.   Cardiovascular:      Rate and Rhythm: Normal rate.      Heart sounds: Normal heart sounds.   Pulmonary:      Effort: Pulmonary effort is normal. No respiratory distress.      Breath sounds: Normal breath sounds.     Hive have resolved with only some traces on back  Assessment:     1. Hives    2. Allergic state, sequela      Plan:   Leschia was seen today for allergic reaction.    Diagnoses and all orders for this visit:    Hives  -     Ambulatory referral/consult to Allergy; Future  -     betamethasone acetate-betamethasone sodium phosphate (CELESTONE) 6 mg/mL injection; Inject 1 mL (6 mg total) into the muscle once. for 1 dose    Allergic state, sequela  Comments:  Renew EpiPen  Orders:  -     EPINEPHrine (EPIPEN) 0.3 mg/0.3 mL AtIn; one " injection  -     loratadine (CLARITIN) 10 mg tablet; Take 1 tablet (10 mg total) by mouth once daily.  -     famotidine (PEPCID) 40 MG tablet; Take 1 tablet (40 mg total) by mouth once daily.        Problem List Items Addressed This Visit     None      Visit Diagnoses     Hives    -  Primary    Relevant Medications    betamethasone acetate-betamethasone sodium phosphate (CELESTONE) 6 mg/mL injection    Other Relevant Orders    Ambulatory referral/consult to Allergy    Allergic state, sequela        Renew EpiPen    Relevant Medications    EPINEPHrine (EPIPEN) 0.3 mg/0.3 mL AtIn    loratadine (CLARITIN) 10 mg tablet    famotidine (PEPCID) 40 MG tablet        Orders Placed This Encounter   Procedures    Ambulatory referral/consult to Allergy     Standing Status:   Future     Standing Expiration Date:   8/22/2021     Referral Priority:   Routine     Referral Type:   Allergy Testing     Referral Reason:   Specialty Services Required     Requested Specialty:   Allergy     Number of Visits Requested:   1     Follow up if symptoms worsen or fail to improve.     Medication List          Accurate as of July 22, 2020  3:07 PM. If you have any questions, ask your nurse or doctor.            START taking these medications    betamethasone acetate-betamethasone sodium phosphate 6 mg/mL injection  Commonly known as: CELESTONE  Inject 1 mL (6 mg total) into the muscle once. for 1 dose  Started by: Karina Gomez MD     famotidine 40 MG tablet  Commonly known as: PEPCID  Take 1 tablet (40 mg total) by mouth once daily.  Started by: Karina Gomez MD        CHANGE how you take these medications    hydroCHLOROthiazide 12.5 mg capsule  Commonly known as: MICROZIDE  Take 1 capsule (12.5 mg total) by mouth once daily.  What changed:   · when to take this  · reasons to take this     * loratadine 10 mg tablet  Commonly known as: CLARITIN  What changed: Another medication with the same name was added. Make sure you understand how and  when to take each.  Changed by: Karina Gomez MD     * loratadine 10 mg tablet  Commonly known as: CLARITIN  Take 1 tablet (10 mg total) by mouth once daily.  What changed: You were already taking a medication with the same name, and this prescription was added. Make sure you understand how and when to take each.  Changed by: Karina Gomez MD         * This list has 2 medication(s) that are the same as other medications prescribed for you. Read the directions carefully, and ask your doctor or other care provider to review them with you.            CONTINUE taking these medications    albuterol 90 mcg/actuation inhaler  Commonly known as: PROVENTIL/VENTOLIN HFA  Inhale 2 puffs into the lungs every 6 (six) hours as needed for Wheezing. Rescue     amLODIPine 5 MG tablet  Commonly known as: NORVASC  TAKE ONE TABLET BY MOUTH EVERY DAY (STOP LOSARTAN)     BenadryL 25 mg capsule  Generic drug: diphenhydrAMINE     EPINEPHrine 0.3 mg/0.3 mL Atin  Commonly known as: EPIPEN  one injection     ergocalciferol 50,000 unit Cap  Commonly known as: ERGOCALCIFEROL  Take 1 capsule (50,000 Units total) by mouth every 30 days.     fluticasone propionate 50 mcg/actuation nasal spray  Commonly known as: FLONASE  1 spray (50 mcg total) by Each Nostril route 2 (two) times daily.     hydrocortisone 2.5 % cream     * ketoconazole 2 % cream  Commonly known as: NIZORAL     * ketoconazole 2 % shampoo  Commonly known as: NIZORAL     pantoprazole 40 MG tablet  Commonly known as: PROTONIX  TAKE ONE TABLET BY MOUTH EVERY DAY     triamcinolone acetonide 0.1% 0.1 % cream  Commonly known as: KENALOG         * This list has 2 medication(s) that are the same as other medications prescribed for you. Read the directions carefully, and ask your doctor or other care provider to review them with you.               Where to Get Your Medications      These medications were sent to [facility]  [address] [place] [id number]     Hours: Mon-Fri, 8a-5:30p Phone: [phone]   · EPINEPHrine 0.3 mg/0.3 mL Atin  · famotidine 40 MG tablet  · loratadine 10 mg tablet     These medications were sent to [facility] [place] - [address] [address]  [address] [address] [place] [address]    Phone: [phone]   · betamethasone acetate-betamethasone sodium phosphate 6 mg/mL injection

## 2020-07-29 ENCOUNTER — TELEPHONE (OUTPATIENT)
Dept: INTERNAL MEDICINE | Facility: CLINIC | Age: 60
End: 2020-07-29

## 2020-07-29 DIAGNOSIS — U07.1 COVID-19 VIRUS INFECTION: Primary | ICD-10-CM

## 2020-07-29 DIAGNOSIS — Z20.822 SUSPECTED COVID-19 VIRUS INFECTION: Primary | ICD-10-CM

## 2020-07-29 NOTE — TELEPHONE ENCOUNTER
She went to a out side testing facility and   tested positive   Was wondering if you have any suggestions   They just told her rest

## 2020-07-29 NOTE — TELEPHONE ENCOUNTER
----- Message from Mary Katechantel Siegel sent at 7/29/2020  9:01 AM CDT -----  Contact: [phone]  Appointment Request From: Leschia T Bastian    With Provider: Karina Gomez MD [[facility]]    Preferred Date Range: Any    Preferred Times: Any Time    Reason for visit: COVID-19    Comments:  Coughing , shortness of breath   Exposed to some one with Covid19       Pt is requesting Orders to be put into the system to complete a COVID-19 test due to being exposed to someone with COVID-19 and having some symptoms.

## 2020-07-29 NOTE — TELEPHONE ENCOUNTER
Did her parents go to the outside facility?    I put her on the monitoring program for Covid she will get a message daily.    For temp: tylenol  Keep fluids up  If she has no symptoms then just monitor. She is to quarantine for 10 days.

## 2020-07-31 ENCOUNTER — NURSE TRIAGE (OUTPATIENT)
Dept: ADMINISTRATIVE | Facility: CLINIC | Age: 60
End: 2020-07-31

## 2020-07-31 NOTE — TELEPHONE ENCOUNTER
Spoke with patient and she states she tested positive for COVID 19. States she is on cough medication and antibiotics. She feels as though her cough is worse. States she is having joint pain, and slightly weak. States she has had mild chest heaviness for awhile that comes and goes and doesn't last long--this is not new.. States today she has mild lightheadedness, but she has been sitting outside all morning and it is hot. Disposition per protocol is home care. Will route to PCP.     Reason for Disposition   [1] COVID-19 diagnosed by positive lab test AND [2] mild symptoms (e.g., cough, fever, others) AND [3] no complications or SOB   Intermittent mild chest pain lasting a few seconds each time    Additional Information   Negative: SEVERE difficulty breathing (e.g., struggling for each breath, speaks in single words)   Negative: Difficult to awaken or acting confused (e.g., disoriented, slurred speech)   Negative: Bluish (or gray) lips or face now   Negative: Shock suspected (e.g., cold/pale/clammy skin, too weak to stand, low BP, rapid pulse)   Negative: Sounds like a life-threatening emergency to the triager   Negative: [1] COVID-19 exposure AND [2] NO symptoms   Negative: COVID-19 and Breastfeeding, questions about   Negative: [1] Adult with possible COVID-19 symptoms AND [2] triager concerned about severity of symptoms or other causes   Negative: SEVERE or constant chest pain or pressure (Exception: mild central chest pain, present only when coughing)   Negative: MODERATE difficulty breathing (e.g., speaks in phrases, SOB even at rest, pulse 100-120)   Negative: MILD difficulty breathing (e.g., minimal/no SOB at rest, SOB with walking, pulse <100)   Negative: Chest pain   Negative: Patient sounds very sick or weak to the triager   Negative: Fever > 103 F (39.4 C)   Negative: [1] Fever > 101 F (38.3 C) AND [2] age > 60   Negative: [1] Fever > 100.0 F (37.8 C) AND [2] bedridden (e.g., nursing home  patient, CVA, chronic illness, recovering from surgery)   Negative: HIGH RISK patient (e.g., age > 64 years, diabetes, heart or lung disease, weak immune system)   Negative: [1] COVID-19 infection suspected by caller or triager AND [2] mild symptoms (cough, fever, or others) AND [3] no complications or SOB   Negative: Fever present > 3 days (72 hours)   Negative: [1] Fever returns after gone for over 24 hours AND [2] symptoms worse or not improved   Negative: [1] Continuous (nonstop) coughing interferes with work or school AND [2] no improvement using cough treatment per protocol   Negative: Cough present > 3 weeks   Negative: Severe difficulty breathing (e.g., struggling for each breath, speaks in single words)   Negative: Passed out (i.e., fainted, collapsed and was not responding)   Negative: Chest pain lasting longer than 5 minutes and ANY of the following:* Over 50 years old* Over 30 years old and at least one cardiac risk factor (i.e., high blood pressure, diabetes, high cholesterol, obesity, smoker or strong family history of heart disease)* Pain is crushing, pressure-like, or heavy * Took nitroglycerin and chest pain was not relieved* History of heart disease (i.e., angina, heart attack, bypass surgery, angioplasty, CHF)   Negative: Visible sweat on face or sweat dripping down face   Negative: Sounds like a life-threatening emergency to the triager   Negative: Followed an injury to chest   Negative: SEVERE chest pain   Negative: Pain also present in shoulder(s) or arm(s) or jaw   Negative: Difficulty breathing   Negative: Cocaine use within last 3 days   Negative: History of prior 'blood clot' in leg or lungs (i.e., deep vein thrombosis, pulmonary embolism)   Negative: Recent illness requiring prolonged bed rest (i.e., immobilization)   Negative: Hip or leg fracture in past 2 months (e.g, or had cast on leg or ankle)   Negative: Major surgery in the past month   Negative: Recent  long-distance travel with prolonged time in car, bus, plane, or train (i.e., within past 2 weeks; 6 or more hours duration)   Negative: Heart beating irregularly or very rapidly   Negative: Chest pain lasting longer than 5 minutes   Negative: Intermittent chest pain and pain has been increasing in severity or frequency   Negative: Dizziness or lightheadedness   Negative: Coughing up blood   Negative: Patient sounds very sick or weak to the triager   Negative: Fever > 100.5 F (38.1 C)   Negative: Intermittent chest pains persist > 3 days   Negative: All other patients with chest pain   Negative: Patient wants to be seen    Protocols used: CORONAVIRUS (COVID-19) DIAGNOSED OR [id number], CHEST PAIN-A-OH

## 2020-08-19 ENCOUNTER — TELEPHONE (OUTPATIENT)
Dept: INTERNAL MEDICINE | Facility: CLINIC | Age: 60
End: 2020-08-19

## 2020-08-19 NOTE — TELEPHONE ENCOUNTER
Spoke with pt and she will upload the negative covid test to [facility].  Needs RTW letter.  Thanks .      Kristi

## 2020-08-19 NOTE — TELEPHONE ENCOUNTER
----- Message from Jennifer De Anda sent at 8/19/2020 10:35 AM CDT -----  Contact: Leschia Bastian @  [phone]  Patient was diagnosed with a Negative Covid and has to get a letter stating she's able to return to work. Her test was done at [facility] in [place].

## 2020-10-15 ENCOUNTER — PATIENT MESSAGE (OUTPATIENT)
Dept: INTERNAL MEDICINE | Facility: CLINIC | Age: 60
End: 2020-10-15

## 2020-10-15 RX ORDER — AZITHROMYCIN 250 MG/1
TABLET, FILM COATED ORAL
Qty: 6 TABLET | Refills: 0 | Status: SHIPPED | OUTPATIENT
Start: 2020-10-15 | End: 2021-10-18

## 2021-01-04 ENCOUNTER — PATIENT MESSAGE (OUTPATIENT)
Dept: PHARMACY | Facility: CLINIC | Age: 61
End: 2021-01-04

## 2021-01-04 ENCOUNTER — IMMUNIZATION (OUTPATIENT)
Dept: PHARMACY | Facility: CLINIC | Age: 61
End: 2021-01-04

## 2021-01-04 ENCOUNTER — OFFICE VISIT (OUTPATIENT)
Dept: INTERNAL MEDICINE | Facility: CLINIC | Age: 61
End: 2021-01-04
Payer: COMMERCIAL

## 2021-01-04 ENCOUNTER — IMMUNIZATION (OUTPATIENT)
Dept: PHARMACY | Facility: CLINIC | Age: 61
End: 2021-01-04
Payer: COMMERCIAL

## 2021-01-04 ENCOUNTER — HOSPITAL ENCOUNTER (OUTPATIENT)
Dept: RADIOLOGY | Facility: HOSPITAL | Age: 61
Discharge: HOME | End: 2021-01-04
Attending: INTERNAL MEDICINE
Payer: COMMERCIAL

## 2021-01-04 ENCOUNTER — PATIENT OUTREACH (OUTPATIENT)
Dept: ADMINISTRATIVE | Facility: OTHER | Age: 61
End: 2021-01-04

## 2021-01-04 VITALS
SYSTOLIC BLOOD PRESSURE: 118 MMHG | HEART RATE: 74 BPM | DIASTOLIC BLOOD PRESSURE: 80 MMHG | HEIGHT: 69 IN | BODY MASS INDEX: 28.71 KG/M2 | OXYGEN SATURATION: 99 % | WEIGHT: 193.81 LBS

## 2021-01-04 DIAGNOSIS — Z86.16 HISTORY OF COVID-19: ICD-10-CM

## 2021-01-04 DIAGNOSIS — R07.9 RIGHT-SIDED CHEST PAIN: ICD-10-CM

## 2021-01-04 DIAGNOSIS — Z71.85 VACCINE COUNSELING: ICD-10-CM

## 2021-01-04 DIAGNOSIS — Z00.00 WELLNESS EXAMINATION: Primary | ICD-10-CM

## 2021-01-04 DIAGNOSIS — R06.02 SOB (SHORTNESS OF BREATH): ICD-10-CM

## 2021-01-04 DIAGNOSIS — Z91.89 INCREASED RISK OF BREAST CANCER: ICD-10-CM

## 2021-01-04 DIAGNOSIS — I10 ESSENTIAL HYPERTENSION: Chronic | ICD-10-CM

## 2021-01-04 DIAGNOSIS — G47.33 OBSTRUCTIVE SLEEP APNEA TREATED WITH CONTINUOUS POSITIVE AIRWAY PRESSURE (CPAP): Chronic | ICD-10-CM

## 2021-01-04 DIAGNOSIS — E55.9 MILD VITAMIN D DEFICIENCY: ICD-10-CM

## 2021-01-04 PROBLEM — M62.81 GENERALIZED MUSCLE WEAKNESS: Status: RESOLVED | Noted: 2019-04-25 | Resolved: 2021-01-04

## 2021-01-04 PROCEDURE — 71250 CT THORAX DX C-: CPT | Mod: 26,ICN,, | Performed by: RADIOLOGY

## 2021-01-04 PROCEDURE — 1126F AMNT PAIN NOTED NONE PRSNT: CPT | Mod: S$GLB,,, | Performed by: INTERNAL MEDICINE

## 2021-01-04 PROCEDURE — 71250 CT THORAX DX C-: CPT | Mod: TC

## 2021-01-04 PROCEDURE — 99396 PR PREVENTIVE VISIT,EST,40-64: ICD-10-PCS | Mod: S$GLB,,, | Performed by: INTERNAL MEDICINE

## 2021-01-04 PROCEDURE — 99396 PREV VISIT EST AGE 40-64: CPT | Mod: S$GLB,,, | Performed by: INTERNAL MEDICINE

## 2021-01-04 PROCEDURE — 3079F PR MOST RECENT DIASTOLIC BLOOD PRESSURE 80-89 MM HG: ICD-10-PCS | Mod: CPTII,S$GLB,, | Performed by: INTERNAL MEDICINE

## 2021-01-04 PROCEDURE — 3008F PR BODY MASS INDEX (BMI) DOCUMENTED: ICD-10-PCS | Mod: CPTII,S$GLB,, | Performed by: INTERNAL MEDICINE

## 2021-01-04 PROCEDURE — 71250 CT CHEST WITHOUT CONTRAST: ICD-10-PCS | Mod: 26,ICN,, | Performed by: RADIOLOGY

## 2021-01-04 PROCEDURE — 3074F PR MOST RECENT SYSTOLIC BLOOD PRESSURE < 130 MM HG: ICD-10-PCS | Mod: CPTII,S$GLB,, | Performed by: INTERNAL MEDICINE

## 2021-01-04 PROCEDURE — 99999 PR PBB SHADOW E&M-EST. PATIENT-LVL V: CPT | Mod: PBBFAC,,, | Performed by: INTERNAL MEDICINE

## 2021-01-04 PROCEDURE — 3079F DIAST BP 80-89 MM HG: CPT | Mod: CPTII,S$GLB,, | Performed by: INTERNAL MEDICINE

## 2021-01-04 PROCEDURE — 3074F SYST BP LT 130 MM HG: CPT | Mod: CPTII,S$GLB,, | Performed by: INTERNAL MEDICINE

## 2021-01-04 PROCEDURE — 3008F BODY MASS INDEX DOCD: CPT | Mod: CPTII,S$GLB,, | Performed by: INTERNAL MEDICINE

## 2021-01-04 PROCEDURE — 1126F PR PAIN SEVERITY QUANTIFIED, NO PAIN PRESENT: ICD-10-PCS | Mod: S$GLB,,, | Performed by: INTERNAL MEDICINE

## 2021-01-04 PROCEDURE — 99999 PR PBB SHADOW E&M-EST. PATIENT-LVL V: ICD-10-PCS | Mod: PBBFAC,,, | Performed by: INTERNAL MEDICINE

## 2021-01-04 RX ORDER — PANTOPRAZOLE SODIUM 40 MG/1
40 TABLET, DELAYED RELEASE ORAL DAILY
Qty: 30 TABLET | Refills: 0 | Status: SHIPPED | OUTPATIENT
Start: 2021-01-04 | End: 2021-01-19

## 2021-01-05 ENCOUNTER — HOSPITAL ENCOUNTER (OUTPATIENT)
Dept: CARDIOLOGY | Facility: CLINIC | Age: 61
Discharge: HOME | End: 2021-01-05
Payer: COMMERCIAL

## 2021-01-05 ENCOUNTER — OFFICE VISIT (OUTPATIENT)
Dept: CARDIOLOGY | Facility: CLINIC | Age: 61
End: 2021-01-05
Payer: COMMERCIAL

## 2021-01-05 ENCOUNTER — TELEPHONE (OUTPATIENT)
Dept: HEPATOLOGY | Facility: CLINIC | Age: 61
End: 2021-01-05

## 2021-01-05 ENCOUNTER — OFFICE VISIT (OUTPATIENT)
Dept: HEPATOLOGY | Facility: CLINIC | Age: 61
End: 2021-01-05
Payer: COMMERCIAL

## 2021-01-05 VITALS
WEIGHT: 192.25 LBS | BODY MASS INDEX: 28.47 KG/M2 | HEART RATE: 94 BPM | DIASTOLIC BLOOD PRESSURE: 70 MMHG | SYSTOLIC BLOOD PRESSURE: 142 MMHG | HEIGHT: 69 IN

## 2021-01-05 DIAGNOSIS — R07.9 CHEST PAIN, UNSPECIFIED TYPE: Primary | ICD-10-CM

## 2021-01-05 DIAGNOSIS — Z86.16 HISTORY OF COVID-19: ICD-10-CM

## 2021-01-05 DIAGNOSIS — R79.89 ELEVATED LFTS: ICD-10-CM

## 2021-01-05 DIAGNOSIS — R74.8 ELEVATED LIVER ENZYMES: Primary | ICD-10-CM

## 2021-01-05 PROBLEM — E66.3 OVERWEIGHT (BMI 25.0-29.9): Status: ACTIVE | Noted: 2021-01-05

## 2021-01-05 PROCEDURE — 3078F DIAST BP <80 MM HG: CPT | Mod: CPTII,S$GLB,, | Performed by: INTERNAL MEDICINE

## 2021-01-05 PROCEDURE — 3008F BODY MASS INDEX DOCD: CPT | Mod: CPTII,S$GLB,, | Performed by: INTERNAL MEDICINE

## 2021-01-05 PROCEDURE — 3077F SYST BP >= 140 MM HG: CPT | Mod: CPTII,S$GLB,, | Performed by: INTERNAL MEDICINE

## 2021-01-05 PROCEDURE — 3077F PR MOST RECENT SYSTOLIC BLOOD PRESSURE >= 140 MM HG: ICD-10-PCS | Mod: CPTII,S$GLB,, | Performed by: INTERNAL MEDICINE

## 2021-01-05 PROCEDURE — 3078F PR MOST RECENT DIASTOLIC BLOOD PRESSURE < 80 MM HG: ICD-10-PCS | Mod: CPTII,,, | Performed by: NURSE PRACTITIONER

## 2021-01-05 PROCEDURE — 3077F SYST BP >= 140 MM HG: CPT | Mod: CPTII,,, | Performed by: NURSE PRACTITIONER

## 2021-01-05 PROCEDURE — 93000 EKG 12-LEAD: ICD-10-PCS | Mod: S$GLB,,, | Performed by: INTERNAL MEDICINE

## 2021-01-05 PROCEDURE — 93000 ELECTROCARDIOGRAM COMPLETE: CPT | Mod: S$GLB,,, | Performed by: INTERNAL MEDICINE

## 2021-01-05 PROCEDURE — 3078F PR MOST RECENT DIASTOLIC BLOOD PRESSURE < 80 MM HG: ICD-10-PCS | Mod: CPTII,S$GLB,, | Performed by: INTERNAL MEDICINE

## 2021-01-05 PROCEDURE — 99214 PR OFFICE/OUTPT VISIT, EST, LEVL IV, 30-39 MIN: ICD-10-PCS | Mod: S$GLB,,, | Performed by: INTERNAL MEDICINE

## 2021-01-05 PROCEDURE — 99204 PR OFFICE/OUTPT VISIT, NEW, LEVL IV, 45-59 MIN: ICD-10-PCS | Mod: 95,,, | Performed by: NURSE PRACTITIONER

## 2021-01-05 PROCEDURE — 1125F AMNT PAIN NOTED PAIN PRSNT: CPT | Mod: S$GLB,,, | Performed by: INTERNAL MEDICINE

## 2021-01-05 PROCEDURE — 99999 PR PBB SHADOW E&M-EST. PATIENT-LVL V: ICD-10-PCS | Mod: PBBFAC,,, | Performed by: INTERNAL MEDICINE

## 2021-01-05 PROCEDURE — 3077F PR MOST RECENT SYSTOLIC BLOOD PRESSURE >= 140 MM HG: ICD-10-PCS | Mod: CPTII,,, | Performed by: NURSE PRACTITIONER

## 2021-01-05 PROCEDURE — 3008F PR BODY MASS INDEX (BMI) DOCUMENTED: ICD-10-PCS | Mod: CPTII,S$GLB,, | Performed by: INTERNAL MEDICINE

## 2021-01-05 PROCEDURE — 99204 OFFICE O/P NEW MOD 45 MIN: CPT | Mod: 95,,, | Performed by: NURSE PRACTITIONER

## 2021-01-05 PROCEDURE — 99999 PR PBB SHADOW E&M-EST. PATIENT-LVL V: CPT | Mod: PBBFAC,,, | Performed by: INTERNAL MEDICINE

## 2021-01-05 PROCEDURE — 99214 OFFICE O/P EST MOD 30 MIN: CPT | Mod: S$GLB,,, | Performed by: INTERNAL MEDICINE

## 2021-01-05 PROCEDURE — 3078F DIAST BP <80 MM HG: CPT | Mod: CPTII,,, | Performed by: NURSE PRACTITIONER

## 2021-01-05 PROCEDURE — 1125F PR PAIN SEVERITY QUANTIFIED, PAIN PRESENT: ICD-10-PCS | Mod: S$GLB,,, | Performed by: INTERNAL MEDICINE

## 2021-01-15 ENCOUNTER — HOSPITAL ENCOUNTER (OUTPATIENT)
Dept: RADIOLOGY | Facility: HOSPITAL | Age: 61
Discharge: HOME | End: 2021-01-15
Attending: NURSE PRACTITIONER
Payer: COMMERCIAL

## 2021-01-15 DIAGNOSIS — R74.8 ELEVATED LIVER ENZYMES: ICD-10-CM

## 2021-01-15 PROCEDURE — 76700 US EXAM ABDOM COMPLETE: CPT | Mod: TC,PO

## 2021-01-18 ENCOUNTER — PATIENT MESSAGE (OUTPATIENT)
Dept: HEPATOLOGY | Facility: CLINIC | Age: 61
End: 2021-01-18

## 2021-01-19 ENCOUNTER — OFFICE VISIT (OUTPATIENT)
Dept: SLEEP MEDICINE | Facility: CLINIC | Age: 61
End: 2021-01-19
Payer: COMMERCIAL

## 2021-01-19 VITALS
WEIGHT: 191.81 LBS | BODY MASS INDEX: 28.41 KG/M2 | HEIGHT: 69 IN | DIASTOLIC BLOOD PRESSURE: 91 MMHG | SYSTOLIC BLOOD PRESSURE: 156 MMHG | HEART RATE: 65 BPM

## 2021-01-19 DIAGNOSIS — G47.33 OBSTRUCTIVE SLEEP APNEA TREATED WITH CONTINUOUS POSITIVE AIRWAY PRESSURE (CPAP): Chronic | ICD-10-CM

## 2021-01-19 PROCEDURE — 3008F BODY MASS INDEX DOCD: CPT | Mod: CPTII,S$GLB,, | Performed by: NURSE PRACTITIONER

## 2021-01-19 PROCEDURE — 3080F DIAST BP >= 90 MM HG: CPT | Mod: CPTII,S$GLB,, | Performed by: NURSE PRACTITIONER

## 2021-01-19 PROCEDURE — 3080F PR MOST RECENT DIASTOLIC BLOOD PRESSURE >= 90 MM HG: ICD-10-PCS | Mod: CPTII,S$GLB,, | Performed by: NURSE PRACTITIONER

## 2021-01-19 PROCEDURE — 1126F PR PAIN SEVERITY QUANTIFIED, NO PAIN PRESENT: ICD-10-PCS | Mod: S$GLB,,, | Performed by: NURSE PRACTITIONER

## 2021-01-19 PROCEDURE — 3077F SYST BP >= 140 MM HG: CPT | Mod: CPTII,S$GLB,, | Performed by: NURSE PRACTITIONER

## 2021-01-19 PROCEDURE — 1126F AMNT PAIN NOTED NONE PRSNT: CPT | Mod: S$GLB,,, | Performed by: NURSE PRACTITIONER

## 2021-01-19 PROCEDURE — 99999 PR PBB SHADOW E&M-EST. PATIENT-LVL IV: ICD-10-PCS | Mod: PBBFAC,,, | Performed by: NURSE PRACTITIONER

## 2021-01-19 PROCEDURE — 99999 PR PBB SHADOW E&M-EST. PATIENT-LVL IV: CPT | Mod: PBBFAC,,, | Performed by: NURSE PRACTITIONER

## 2021-01-19 PROCEDURE — 99213 OFFICE O/P EST LOW 20 MIN: CPT | Mod: S$GLB,,, | Performed by: NURSE PRACTITIONER

## 2021-01-19 PROCEDURE — 3008F PR BODY MASS INDEX (BMI) DOCUMENTED: ICD-10-PCS | Mod: CPTII,S$GLB,, | Performed by: NURSE PRACTITIONER

## 2021-01-19 PROCEDURE — 99213 PR OFFICE/OUTPT VISIT, EST, LEVL III, 20-29 MIN: ICD-10-PCS | Mod: S$GLB,,, | Performed by: NURSE PRACTITIONER

## 2021-01-19 PROCEDURE — 3077F PR MOST RECENT SYSTOLIC BLOOD PRESSURE >= 140 MM HG: ICD-10-PCS | Mod: CPTII,S$GLB,, | Performed by: NURSE PRACTITIONER

## 2021-01-19 RX ORDER — DICYCLOMINE HYDROCHLORIDE 10 MG/1
10 CAPSULE ORAL 2 TIMES DAILY
COMMUNITY
Start: 2021-01-13

## 2021-01-19 RX ORDER — RABEPRAZOLE SODIUM 20 MG/1
TABLET, DELAYED RELEASE ORAL
COMMUNITY
Start: 2021-01-13 | End: 2021-01-19

## 2021-01-25 ENCOUNTER — PATIENT MESSAGE (OUTPATIENT)
Dept: HEPATOLOGY | Facility: CLINIC | Age: 61
End: 2021-01-25

## 2021-01-25 ENCOUNTER — TELEPHONE (OUTPATIENT)
Dept: HEPATOLOGY | Facility: CLINIC | Age: 61
End: 2021-01-25

## 2021-01-25 DIAGNOSIS — R74.8 ELEVATED LIVER ENZYMES: Primary | ICD-10-CM

## 2021-01-25 DIAGNOSIS — R93.2 ABNORMAL LIVER DIAGNOSTIC IMAGING: ICD-10-CM

## 2021-01-28 ENCOUNTER — PROCEDURE VISIT (OUTPATIENT)
Dept: HEPATOLOGY | Facility: CLINIC | Age: 61
End: 2021-01-28
Payer: COMMERCIAL

## 2021-01-28 ENCOUNTER — OFFICE VISIT (OUTPATIENT)
Dept: HEPATOLOGY | Facility: CLINIC | Age: 61
End: 2021-01-28
Payer: COMMERCIAL

## 2021-01-28 ENCOUNTER — TELEPHONE (OUTPATIENT)
Dept: HEPATOLOGY | Facility: CLINIC | Age: 61
End: 2021-01-28

## 2021-01-28 ENCOUNTER — PATIENT MESSAGE (OUTPATIENT)
Dept: PHARMACY | Facility: CLINIC | Age: 61
End: 2021-01-28

## 2021-01-28 VITALS
RESPIRATION RATE: 18 BRPM | HEART RATE: 80 BPM | TEMPERATURE: 97 F | WEIGHT: 194.44 LBS | BODY MASS INDEX: 28.8 KG/M2 | OXYGEN SATURATION: 96 % | DIASTOLIC BLOOD PRESSURE: 75 MMHG | HEIGHT: 69 IN | SYSTOLIC BLOOD PRESSURE: 144 MMHG

## 2021-01-28 DIAGNOSIS — N28.1 KIDNEY CYST, ACQUIRED: ICD-10-CM

## 2021-01-28 DIAGNOSIS — R74.8 ELEVATED LIVER ENZYMES: Primary | ICD-10-CM

## 2021-01-28 DIAGNOSIS — I10 ESSENTIAL HYPERTENSION: Chronic | ICD-10-CM

## 2021-01-28 DIAGNOSIS — R93.2 ABNORMAL LIVER DIAGNOSTIC IMAGING: ICD-10-CM

## 2021-01-28 DIAGNOSIS — K76.0 FATTY LIVER: ICD-10-CM

## 2021-01-28 DIAGNOSIS — Z23 NEED FOR HEPATITIS A AND B VACCINATION: ICD-10-CM

## 2021-01-28 DIAGNOSIS — R74.8 ELEVATED LIVER ENZYMES: ICD-10-CM

## 2021-01-28 DIAGNOSIS — E66.3 OVERWEIGHT (BMI 25.0-29.9): ICD-10-CM

## 2021-01-28 PROCEDURE — 3078F DIAST BP <80 MM HG: CPT | Mod: CPTII,S$GLB,, | Performed by: NURSE PRACTITIONER

## 2021-01-28 PROCEDURE — 99999 PR PBB SHADOW E&M-EST. PATIENT-LVL V: ICD-10-PCS | Mod: PBBFAC,,, | Performed by: NURSE PRACTITIONER

## 2021-01-28 PROCEDURE — 91200 LIVER ELASTOGRAPHY: CPT | Mod: S$GLB,,, | Performed by: NURSE PRACTITIONER

## 2021-01-28 PROCEDURE — 99214 OFFICE O/P EST MOD 30 MIN: CPT | Mod: S$GLB,,, | Performed by: NURSE PRACTITIONER

## 2021-01-28 PROCEDURE — 3008F PR BODY MASS INDEX (BMI) DOCUMENTED: ICD-10-PCS | Mod: CPTII,S$GLB,, | Performed by: NURSE PRACTITIONER

## 2021-01-28 PROCEDURE — 3078F PR MOST RECENT DIASTOLIC BLOOD PRESSURE < 80 MM HG: ICD-10-PCS | Mod: CPTII,S$GLB,, | Performed by: NURSE PRACTITIONER

## 2021-01-28 PROCEDURE — 1126F AMNT PAIN NOTED NONE PRSNT: CPT | Mod: S$GLB,,, | Performed by: NURSE PRACTITIONER

## 2021-01-28 PROCEDURE — 3077F PR MOST RECENT SYSTOLIC BLOOD PRESSURE >= 140 MM HG: ICD-10-PCS | Mod: CPTII,S$GLB,, | Performed by: NURSE PRACTITIONER

## 2021-01-28 PROCEDURE — 1126F PR PAIN SEVERITY QUANTIFIED, NO PAIN PRESENT: ICD-10-PCS | Mod: S$GLB,,, | Performed by: NURSE PRACTITIONER

## 2021-01-28 PROCEDURE — 3008F BODY MASS INDEX DOCD: CPT | Mod: CPTII,S$GLB,, | Performed by: NURSE PRACTITIONER

## 2021-01-28 PROCEDURE — 3077F SYST BP >= 140 MM HG: CPT | Mod: CPTII,S$GLB,, | Performed by: NURSE PRACTITIONER

## 2021-01-28 PROCEDURE — 91200 FIBROSCAN (VIBRATION CONTROLLED TRANSIENT ELASTOGRAPHY): ICD-10-PCS | Mod: S$GLB,,, | Performed by: NURSE PRACTITIONER

## 2021-01-28 PROCEDURE — 99214 PR OFFICE/OUTPT VISIT, EST, LEVL IV, 30-39 MIN: ICD-10-PCS | Mod: S$GLB,,, | Performed by: NURSE PRACTITIONER

## 2021-01-28 PROCEDURE — 99999 PR PBB SHADOW E&M-EST. PATIENT-LVL V: CPT | Mod: PBBFAC,,, | Performed by: NURSE PRACTITIONER

## 2021-01-28 RX ORDER — RABEPRAZOLE SODIUM 20 MG/1
20 TABLET, DELAYED RELEASE ORAL DAILY
COMMUNITY
End: 2022-04-13

## 2021-01-29 ENCOUNTER — IMMUNIZATION (OUTPATIENT)
Dept: PHARMACY | Facility: CLINIC | Age: 61
End: 2021-01-29
Payer: COMMERCIAL

## 2021-03-04 ENCOUNTER — PATIENT MESSAGE (OUTPATIENT)
Dept: PHARMACY | Facility: CLINIC | Age: 61
End: 2021-03-04

## 2021-03-04 ENCOUNTER — OFFICE VISIT (OUTPATIENT)
Dept: HEMATOLOGY/ONCOLOGY | Facility: CLINIC | Age: 61
End: 2021-03-04
Payer: COMMERCIAL

## 2021-03-04 VITALS
SYSTOLIC BLOOD PRESSURE: 142 MMHG | DIASTOLIC BLOOD PRESSURE: 93 MMHG | TEMPERATURE: 98 F | HEART RATE: 73 BPM | BODY MASS INDEX: 29.03 KG/M2 | WEIGHT: 196 LBS | HEIGHT: 69 IN | RESPIRATION RATE: 18 BRPM | OXYGEN SATURATION: 100 %

## 2021-03-04 DIAGNOSIS — Z91.89 INCREASED RISK OF BREAST CANCER: Primary | ICD-10-CM

## 2021-03-04 DIAGNOSIS — I10 ESSENTIAL HYPERTENSION: Chronic | ICD-10-CM

## 2021-03-04 DIAGNOSIS — R92.30 DENSE BREAST TISSUE ON MAMMOGRAM: ICD-10-CM

## 2021-03-04 DIAGNOSIS — Z80.3 FAMILY HISTORY OF BREAST CANCER: ICD-10-CM

## 2021-03-04 DIAGNOSIS — K76.0 FATTY LIVER: ICD-10-CM

## 2021-03-04 PROCEDURE — 99205 OFFICE O/P NEW HI 60 MIN: CPT | Mod: S$GLB,,, | Performed by: NURSE PRACTITIONER

## 2021-03-04 PROCEDURE — 1126F AMNT PAIN NOTED NONE PRSNT: CPT | Mod: S$GLB,,, | Performed by: NURSE PRACTITIONER

## 2021-03-04 PROCEDURE — 1126F PR PAIN SEVERITY QUANTIFIED, NO PAIN PRESENT: ICD-10-PCS | Mod: S$GLB,,, | Performed by: NURSE PRACTITIONER

## 2021-03-04 PROCEDURE — 99205 PR OFFICE/OUTPT VISIT, NEW, LEVL V, 60-74 MIN: ICD-10-PCS | Mod: S$GLB,,, | Performed by: NURSE PRACTITIONER

## 2021-03-04 PROCEDURE — 99999 PR PBB SHADOW E&M-EST. PATIENT-LVL V: CPT | Mod: PBBFAC,,, | Performed by: NURSE PRACTITIONER

## 2021-03-04 PROCEDURE — 3080F PR MOST RECENT DIASTOLIC BLOOD PRESSURE >= 90 MM HG: ICD-10-PCS | Mod: CPTII,S$GLB,, | Performed by: NURSE PRACTITIONER

## 2021-03-04 PROCEDURE — 3008F BODY MASS INDEX DOCD: CPT | Mod: CPTII,S$GLB,, | Performed by: NURSE PRACTITIONER

## 2021-03-04 PROCEDURE — 3080F DIAST BP >= 90 MM HG: CPT | Mod: CPTII,S$GLB,, | Performed by: NURSE PRACTITIONER

## 2021-03-04 PROCEDURE — 3077F SYST BP >= 140 MM HG: CPT | Mod: CPTII,S$GLB,, | Performed by: NURSE PRACTITIONER

## 2021-03-04 PROCEDURE — 99999 PR PBB SHADOW E&M-EST. PATIENT-LVL V: ICD-10-PCS | Mod: PBBFAC,,, | Performed by: NURSE PRACTITIONER

## 2021-03-04 PROCEDURE — 3077F PR MOST RECENT SYSTOLIC BLOOD PRESSURE >= 140 MM HG: ICD-10-PCS | Mod: CPTII,S$GLB,, | Performed by: NURSE PRACTITIONER

## 2021-03-04 PROCEDURE — 3008F PR BODY MASS INDEX (BMI) DOCUMENTED: ICD-10-PCS | Mod: CPTII,S$GLB,, | Performed by: NURSE PRACTITIONER

## 2021-03-04 RX ORDER — LINACLOTIDE 72 UG/1
72 CAPSULE, GELATIN COATED ORAL DAILY
COMMUNITY
Start: 2021-03-01 | End: 2023-10-18

## 2021-03-05 ENCOUNTER — IMMUNIZATION (OUTPATIENT)
Dept: PHARMACY | Facility: CLINIC | Age: 61
End: 2021-03-05
Payer: COMMERCIAL

## 2021-03-16 ENCOUNTER — PATIENT MESSAGE (OUTPATIENT)
Dept: PHARMACY | Facility: CLINIC | Age: 61
End: 2021-03-16

## 2021-03-17 ENCOUNTER — PATIENT OUTREACH (OUTPATIENT)
Dept: ADMINISTRATIVE | Facility: HOSPITAL | Age: 61
End: 2021-03-17

## 2021-03-17 ENCOUNTER — PATIENT MESSAGE (OUTPATIENT)
Dept: ADMINISTRATIVE | Facility: HOSPITAL | Age: 61
End: 2021-03-17

## 2021-04-08 ENCOUNTER — PATIENT MESSAGE (OUTPATIENT)
Dept: INTERNAL MEDICINE | Facility: CLINIC | Age: 61
End: 2021-04-08

## 2021-04-08 RX ORDER — ERGOCALCIFEROL 1.25 MG/1
50000 CAPSULE ORAL
Qty: 3 CAPSULE | Refills: 4 | Status: SHIPPED | OUTPATIENT
Start: 2021-04-08 | End: 2023-05-19

## 2021-04-08 RX ORDER — EPINEPHRINE 0.3 MG/.3ML
1 INJECTION SUBCUTANEOUS ONCE
Qty: 1 DEVICE | Refills: 3 | Status: SHIPPED | OUTPATIENT
Start: 2021-04-08 | End: 2021-04-08

## 2021-05-11 ENCOUNTER — HOSPITAL ENCOUNTER (OUTPATIENT)
Dept: RADIOLOGY | Facility: HOSPITAL | Age: 61
Discharge: HOME | End: 2021-05-11
Attending: NURSE PRACTITIONER
Payer: COMMERCIAL

## 2021-05-11 DIAGNOSIS — Z91.89 INCREASED RISK OF BREAST CANCER: ICD-10-CM

## 2021-05-11 DIAGNOSIS — R92.30 DENSE BREAST TISSUE ON MAMMOGRAM: ICD-10-CM

## 2021-05-11 DIAGNOSIS — Z80.3 FAMILY HISTORY OF BREAST CANCER: ICD-10-CM

## 2021-05-11 PROCEDURE — 77067 SCR MAMMO BI INCL CAD: CPT | Mod: TC,PO

## 2021-06-30 ENCOUNTER — PATIENT OUTREACH (OUTPATIENT)
Dept: ADMINISTRATIVE | Facility: HOSPITAL | Age: 61
End: 2021-06-30

## 2021-07-19 ENCOUNTER — NURSE TRIAGE (OUTPATIENT)
Dept: ADMINISTRATIVE | Facility: CLINIC | Age: 61
End: 2021-07-19

## 2021-07-19 ENCOUNTER — HOSPITAL ENCOUNTER (EMERGENCY)
Facility: HOSPITAL | Age: 61
Discharge: HOME | End: 2021-07-19
Attending: EMERGENCY MEDICINE
Payer: COMMERCIAL

## 2021-07-19 VITALS
RESPIRATION RATE: 18 BRPM | BODY MASS INDEX: 28.58 KG/M2 | WEIGHT: 193 LBS | DIASTOLIC BLOOD PRESSURE: 84 MMHG | OXYGEN SATURATION: 100 % | SYSTOLIC BLOOD PRESSURE: 147 MMHG | HEART RATE: 87 BPM | TEMPERATURE: 99 F | HEIGHT: 69 IN

## 2021-07-19 DIAGNOSIS — K57.32 DIVERTICULITIS OF SIGMOID COLON: ICD-10-CM

## 2021-07-19 DIAGNOSIS — R10.32 LEFT LOWER QUADRANT ABDOMINAL PAIN: Primary | ICD-10-CM

## 2021-07-19 LAB
ALBUMIN SERPL BCP-MCNC: 4.7 G/DL (ref 3.5–5.2)
ALP SERPL-CCNC: 99 U/L (ref 38–126)
ALT SERPL W/O P-5'-P-CCNC: 45 U/L (ref 10–44)
ANION GAP SERPL CALC-SCNC: 8 MMOL/L (ref 8–16)
AST SERPL-CCNC: 50 U/L (ref 15–46)
BASOPHILS # BLD AUTO: 0.02 K/UL (ref 0–0.2)
BASOPHILS NFR BLD: 0.3 % (ref 0–1.9)
BILIRUB SERPL-MCNC: 1.3 MG/DL (ref 0.1–1)
BILIRUB UR QL STRIP: NEGATIVE
CALCIUM SERPL-MCNC: 9.8 MG/DL (ref 8.7–10.5)
CHLORIDE SERPL-SCNC: 104 MMOL/L (ref 95–110)
CLARITY UR REFRACT.AUTO: CLEAR
CO2 SERPL-SCNC: 28 MMOL/L (ref 23–29)
COLOR UR AUTO: YELLOW
CREAT SERPL-MCNC: 0.67 MG/DL (ref 0.5–1.4)
DIFFERENTIAL METHOD: ABNORMAL
EOSINOPHIL # BLD AUTO: 0 K/UL (ref 0–0.5)
EOSINOPHIL NFR BLD: 0.4 % (ref 0–8)
ERYTHROCYTE [DISTWIDTH] IN BLOOD BY AUTOMATED COUNT: 14.1 % (ref 11.5–14.5)
EST. GFR  (AFRICAN AMERICAN): >60 ML/MIN/1.73 M^2
EST. GFR  (NON AFRICAN AMERICAN): >60 ML/MIN/1.73 M^2
GLUCOSE SERPL-MCNC: 103 MG/DL (ref 70–110)
GLUCOSE UR QL STRIP: NEGATIVE
HCT VFR BLD AUTO: 40.6 % (ref 37–48.5)
HGB BLD-MCNC: 12 G/DL (ref 12–16)
HGB UR QL STRIP: ABNORMAL
IMM GRANULOCYTES # BLD AUTO: 0.02 K/UL (ref 0–0.04)
IMM GRANULOCYTES NFR BLD AUTO: 0.3 % (ref 0–0.5)
KETONES UR QL STRIP: NEGATIVE
LEUKOCYTE ESTERASE UR QL STRIP: NEGATIVE
LYMPHOCYTES # BLD AUTO: 1.8 K/UL (ref 1–4.8)
LYMPHOCYTES NFR BLD: 23.4 % (ref 18–48)
MCH RBC QN AUTO: 22.9 PG (ref 27–31)
MCHC RBC AUTO-ENTMCNC: 29.6 G/DL (ref 32–36)
MCV RBC AUTO: 77 FL (ref 82–98)
MONOCYTES # BLD AUTO: 0.7 K/UL (ref 0.3–1)
MONOCYTES NFR BLD: 8.9 % (ref 4–15)
NEUTROPHILS # BLD AUTO: 5.3 K/UL (ref 1.8–7.7)
NEUTROPHILS NFR BLD: 66.7 % (ref 38–73)
NITRITE UR QL STRIP: NEGATIVE
NRBC BLD-RTO: 0 /100 WBC
PH UR STRIP: 6 [PH] (ref 5–8)
PLATELET # BLD AUTO: 214 K/UL (ref 150–450)
PMV BLD AUTO: 10.2 FL (ref 9.2–12.9)
POTASSIUM SERPL-SCNC: 4 MMOL/L (ref 3.5–5.1)
PROT SERPL-MCNC: 8 G/DL (ref 6–8.4)
PROT UR QL STRIP: NEGATIVE
RBC # BLD AUTO: 5.25 M/UL (ref 4–5.4)
SODIUM SERPL-SCNC: 140 MMOL/L (ref 136–145)
SP GR UR STRIP: 1.01 (ref 1–1.03)
URN SPEC COLLECT METH UR: ABNORMAL
UROBILINOGEN UR STRIP-ACNC: NEGATIVE EU/DL
UUN UR-MCNC: 9 MG/DL (ref 7–17)
WBC # BLD AUTO: 7.86 K/UL (ref 3.9–12.7)

## 2021-07-19 PROCEDURE — 25000003 PHARM REV CODE 250: Mod: ER | Performed by: EMERGENCY MEDICINE

## 2021-07-19 PROCEDURE — 96375 TX/PRO/DX INJ NEW DRUG ADDON: CPT | Mod: ER

## 2021-07-19 PROCEDURE — 63600175 PHARM REV CODE 636 W HCPCS: Mod: ER | Performed by: EMERGENCY MEDICINE

## 2021-07-19 PROCEDURE — 99285 EMERGENCY DEPT VISIT HI MDM: CPT | Mod: 25,ER

## 2021-07-19 PROCEDURE — 96361 HYDRATE IV INFUSION ADD-ON: CPT | Mod: ER

## 2021-07-19 PROCEDURE — 80053 COMPREHEN METABOLIC PANEL: CPT | Mod: ER | Performed by: EMERGENCY MEDICINE

## 2021-07-19 PROCEDURE — 85025 COMPLETE CBC W/AUTO DIFF WBC: CPT | Mod: ER | Performed by: EMERGENCY MEDICINE

## 2021-07-19 PROCEDURE — 81003 URINALYSIS AUTO W/O SCOPE: CPT | Mod: ER | Performed by: EMERGENCY MEDICINE

## 2021-07-19 PROCEDURE — 96374 THER/PROPH/DIAG INJ IV PUSH: CPT | Mod: ER

## 2021-07-19 RX ORDER — PROCHLORPERAZINE EDISYLATE 5 MG/ML
10 INJECTION INTRAMUSCULAR; INTRAVENOUS
Status: COMPLETED | OUTPATIENT
Start: 2021-07-19 | End: 2021-07-19

## 2021-07-19 RX ORDER — CIPROFLOXACIN 500 MG/1
500 TABLET ORAL 2 TIMES DAILY
Qty: 14 TABLET | Refills: 0 | Status: SHIPPED | OUTPATIENT
Start: 2021-07-19 | End: 2021-07-26

## 2021-07-19 RX ORDER — METRONIDAZOLE 500 MG/1
500 TABLET ORAL 3 TIMES DAILY
Qty: 21 TABLET | Refills: 0 | Status: SHIPPED | OUTPATIENT
Start: 2021-07-19 | End: 2021-07-26

## 2021-07-19 RX ORDER — CIPROFLOXACIN 500 MG/1
500 TABLET ORAL
Status: COMPLETED | OUTPATIENT
Start: 2021-07-19 | End: 2021-07-19

## 2021-07-19 RX ORDER — HYDROCODONE BITARTRATE AND ACETAMINOPHEN 7.5; 325 MG/1; MG/1
1 TABLET ORAL EVERY 6 HOURS PRN
Qty: 12 TABLET | Refills: 0 | Status: SHIPPED | OUTPATIENT
Start: 2021-07-19 | End: 2021-11-10

## 2021-07-19 RX ORDER — ONDANSETRON 4 MG/1
4 TABLET, ORALLY DISINTEGRATING ORAL EVERY 6 HOURS PRN
Qty: 10 TABLET | Refills: 0 | Status: SHIPPED | OUTPATIENT
Start: 2021-07-19 | End: 2021-11-10

## 2021-07-19 RX ORDER — DICYCLOMINE HYDROCHLORIDE 20 MG/1
20 TABLET ORAL 3 TIMES DAILY PRN
Qty: 14 TABLET | Refills: 0 | Status: SHIPPED | OUTPATIENT
Start: 2021-07-19 | End: 2021-08-18

## 2021-07-19 RX ORDER — KETOROLAC TROMETHAMINE 30 MG/ML
15 INJECTION, SOLUTION INTRAMUSCULAR; INTRAVENOUS
Status: COMPLETED | OUTPATIENT
Start: 2021-07-19 | End: 2021-07-19

## 2021-07-19 RX ORDER — METRONIDAZOLE 500 MG/1
500 TABLET ORAL
Status: COMPLETED | OUTPATIENT
Start: 2021-07-19 | End: 2021-07-19

## 2021-07-19 RX ADMIN — LIDOCAINE HYDROCHLORIDE: 20 SOLUTION ORAL; TOPICAL at 09:07

## 2021-07-19 RX ADMIN — KETOROLAC TROMETHAMINE 15 MG: 30 INJECTION, SOLUTION INTRAMUSCULAR; INTRAVENOUS at 08:07

## 2021-07-19 RX ADMIN — CIPROFLOXACIN 500 MG: 500 TABLET, FILM COATED ORAL at 09:07

## 2021-07-19 RX ADMIN — SODIUM CHLORIDE 1000 ML: 0.9 INJECTION, SOLUTION INTRAVENOUS at 08:07

## 2021-07-19 RX ADMIN — PROCHLORPERAZINE EDISYLATE 10 MG: 5 INJECTION INTRAMUSCULAR; INTRAVENOUS at 08:07

## 2021-07-19 RX ADMIN — METRONIDAZOLE 500 MG: 500 TABLET ORAL at 09:07

## 2021-08-02 RX ORDER — AMLODIPINE BESYLATE 5 MG/1
TABLET ORAL
Qty: 90 TABLET | Refills: 0 | Status: SHIPPED | OUTPATIENT
Start: 2021-08-02 | End: 2022-01-25

## 2021-09-28 NOTE — TELEPHONE ENCOUNTER
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 Ms. Rosaline Peacock advises she will call her niece at 0 to come get her - she does not want to wake her now. Offered food, drink which Ms. Rosaline Peacock denied both stating she will just wait in the waiting room until 0500 when she will call her niece to come pick her up. Advised Ms. Sahni to call be or let registration know if I can get her anything and she verbally acknowledged she would advise if she needed anything. Pt has an appt scheduled  tomorrow for 10:30.

## 2021-10-07 ENCOUNTER — TELEPHONE (OUTPATIENT)
Dept: ALLERGY | Facility: CLINIC | Age: 61
End: 2021-10-07

## 2021-10-07 ENCOUNTER — OFFICE VISIT (OUTPATIENT)
Dept: FAMILY MEDICINE | Facility: CLINIC | Age: 61
End: 2021-10-07
Payer: COMMERCIAL

## 2021-10-07 ENCOUNTER — PATIENT MESSAGE (OUTPATIENT)
Dept: FAMILY MEDICINE | Facility: CLINIC | Age: 61
End: 2021-10-07

## 2021-10-07 ENCOUNTER — TELEPHONE (OUTPATIENT)
Dept: INTERNAL MEDICINE | Facility: CLINIC | Age: 61
End: 2021-10-07

## 2021-10-07 VITALS
WEIGHT: 188.25 LBS | DIASTOLIC BLOOD PRESSURE: 90 MMHG | OXYGEN SATURATION: 99 % | SYSTOLIC BLOOD PRESSURE: 140 MMHG | BODY MASS INDEX: 27.8 KG/M2 | HEART RATE: 81 BPM

## 2021-10-07 DIAGNOSIS — L50.0 URTICARIA DUE TO FOOD ALLERGY: Primary | ICD-10-CM

## 2021-10-07 PROCEDURE — 3080F DIAST BP >= 90 MM HG: CPT | Mod: CPTII,S$GLB,, | Performed by: STUDENT IN AN ORGANIZED HEALTH CARE EDUCATION/TRAINING PROGRAM

## 2021-10-07 PROCEDURE — 1159F MED LIST DOCD IN RCRD: CPT | Mod: CPTII,S$GLB,, | Performed by: STUDENT IN AN ORGANIZED HEALTH CARE EDUCATION/TRAINING PROGRAM

## 2021-10-07 PROCEDURE — 1160F PR REVIEW ALL MEDS BY PRESCRIBER/CLIN PHARMACIST DOCUMENTED: ICD-10-PCS | Mod: CPTII,S$GLB,, | Performed by: STUDENT IN AN ORGANIZED HEALTH CARE EDUCATION/TRAINING PROGRAM

## 2021-10-07 PROCEDURE — 3077F SYST BP >= 140 MM HG: CPT | Mod: CPTII,S$GLB,, | Performed by: STUDENT IN AN ORGANIZED HEALTH CARE EDUCATION/TRAINING PROGRAM

## 2021-10-07 PROCEDURE — 3080F PR MOST RECENT DIASTOLIC BLOOD PRESSURE >= 90 MM HG: ICD-10-PCS | Mod: CPTII,S$GLB,, | Performed by: STUDENT IN AN ORGANIZED HEALTH CARE EDUCATION/TRAINING PROGRAM

## 2021-10-07 PROCEDURE — 3044F HG A1C LEVEL LT 7.0%: CPT | Mod: CPTII,S$GLB,, | Performed by: STUDENT IN AN ORGANIZED HEALTH CARE EDUCATION/TRAINING PROGRAM

## 2021-10-07 PROCEDURE — 1160F RVW MEDS BY RX/DR IN RCRD: CPT | Mod: CPTII,S$GLB,, | Performed by: STUDENT IN AN ORGANIZED HEALTH CARE EDUCATION/TRAINING PROGRAM

## 2021-10-07 PROCEDURE — 99214 PR OFFICE/OUTPT VISIT, EST, LEVL IV, 30-39 MIN: ICD-10-PCS | Mod: S$GLB,,, | Performed by: STUDENT IN AN ORGANIZED HEALTH CARE EDUCATION/TRAINING PROGRAM

## 2021-10-07 PROCEDURE — 3008F BODY MASS INDEX DOCD: CPT | Mod: CPTII,S$GLB,, | Performed by: STUDENT IN AN ORGANIZED HEALTH CARE EDUCATION/TRAINING PROGRAM

## 2021-10-07 PROCEDURE — 99999 PR PBB SHADOW E&M-EST. PATIENT-LVL IV: CPT | Mod: PBBFAC,,, | Performed by: STUDENT IN AN ORGANIZED HEALTH CARE EDUCATION/TRAINING PROGRAM

## 2021-10-07 PROCEDURE — 3044F PR MOST RECENT HEMOGLOBIN A1C LEVEL <7.0%: ICD-10-PCS | Mod: CPTII,S$GLB,, | Performed by: STUDENT IN AN ORGANIZED HEALTH CARE EDUCATION/TRAINING PROGRAM

## 2021-10-07 PROCEDURE — 1159F PR MEDICATION LIST DOCUMENTED IN MEDICAL RECORD: ICD-10-PCS | Mod: CPTII,S$GLB,, | Performed by: STUDENT IN AN ORGANIZED HEALTH CARE EDUCATION/TRAINING PROGRAM

## 2021-10-07 PROCEDURE — 3077F PR MOST RECENT SYSTOLIC BLOOD PRESSURE >= 140 MM HG: ICD-10-PCS | Mod: CPTII,S$GLB,, | Performed by: STUDENT IN AN ORGANIZED HEALTH CARE EDUCATION/TRAINING PROGRAM

## 2021-10-07 PROCEDURE — 99214 OFFICE O/P EST MOD 30 MIN: CPT | Mod: S$GLB,,, | Performed by: STUDENT IN AN ORGANIZED HEALTH CARE EDUCATION/TRAINING PROGRAM

## 2021-10-07 PROCEDURE — 3008F PR BODY MASS INDEX (BMI) DOCUMENTED: ICD-10-PCS | Mod: CPTII,S$GLB,, | Performed by: STUDENT IN AN ORGANIZED HEALTH CARE EDUCATION/TRAINING PROGRAM

## 2021-10-07 PROCEDURE — 99999 PR PBB SHADOW E&M-EST. PATIENT-LVL IV: ICD-10-PCS | Mod: PBBFAC,,, | Performed by: STUDENT IN AN ORGANIZED HEALTH CARE EDUCATION/TRAINING PROGRAM

## 2021-10-07 RX ORDER — LORATADINE 10 MG/1
10 TABLET ORAL DAILY
Qty: 30 TABLET | Refills: 1 | Status: SHIPPED | OUTPATIENT
Start: 2021-10-07 | End: 2023-05-19

## 2021-10-14 ENCOUNTER — PATIENT OUTREACH (OUTPATIENT)
Dept: ADMINISTRATIVE | Facility: OTHER | Age: 61
End: 2021-10-14

## 2021-10-18 ENCOUNTER — OFFICE VISIT (OUTPATIENT)
Dept: ALLERGY | Facility: CLINIC | Age: 61
End: 2021-10-18
Payer: COMMERCIAL

## 2021-10-18 ENCOUNTER — LAB VISIT (OUTPATIENT)
Dept: LAB | Facility: HOSPITAL | Age: 61
End: 2021-10-18
Attending: ALLERGY & IMMUNOLOGY
Payer: COMMERCIAL

## 2021-10-18 VITALS — HEIGHT: 69 IN | WEIGHT: 183 LBS | BODY MASS INDEX: 27.11 KG/M2

## 2021-10-18 DIAGNOSIS — L50.9 URTICARIA: ICD-10-CM

## 2021-10-18 DIAGNOSIS — Z91.013 SHRIMP ALLERGY: Primary | ICD-10-CM

## 2021-10-18 DIAGNOSIS — L50.0 URTICARIA DUE TO FOOD ALLERGY: ICD-10-CM

## 2021-10-18 PROCEDURE — 99999 PR PBB SHADOW E&M-EST. PATIENT-LVL IV: ICD-10-PCS | Mod: PBBFAC,,, | Performed by: ALLERGY & IMMUNOLOGY

## 2021-10-18 PROCEDURE — 3044F HG A1C LEVEL LT 7.0%: CPT | Mod: CPTII,S$GLB,, | Performed by: ALLERGY & IMMUNOLOGY

## 2021-10-18 PROCEDURE — 99204 PR OFFICE/OUTPT VISIT, NEW, LEVL IV, 45-59 MIN: ICD-10-PCS | Mod: S$GLB,,, | Performed by: ALLERGY & IMMUNOLOGY

## 2021-10-18 PROCEDURE — 86003 ALLG SPEC IGE CRUDE XTRC EA: CPT | Mod: 59 | Performed by: ALLERGY & IMMUNOLOGY

## 2021-10-18 PROCEDURE — 3044F PR MOST RECENT HEMOGLOBIN A1C LEVEL <7.0%: ICD-10-PCS | Mod: CPTII,S$GLB,, | Performed by: ALLERGY & IMMUNOLOGY

## 2021-10-18 PROCEDURE — 3008F BODY MASS INDEX DOCD: CPT | Mod: CPTII,S$GLB,, | Performed by: ALLERGY & IMMUNOLOGY

## 2021-10-18 PROCEDURE — 1159F MED LIST DOCD IN RCRD: CPT | Mod: CPTII,S$GLB,, | Performed by: ALLERGY & IMMUNOLOGY

## 2021-10-18 PROCEDURE — 99999 PR PBB SHADOW E&M-EST. PATIENT-LVL IV: CPT | Mod: PBBFAC,,, | Performed by: ALLERGY & IMMUNOLOGY

## 2021-10-18 PROCEDURE — 1159F PR MEDICATION LIST DOCUMENTED IN MEDICAL RECORD: ICD-10-PCS | Mod: CPTII,S$GLB,, | Performed by: ALLERGY & IMMUNOLOGY

## 2021-10-18 PROCEDURE — 36415 COLL VENOUS BLD VENIPUNCTURE: CPT | Performed by: ALLERGY & IMMUNOLOGY

## 2021-10-18 PROCEDURE — 3008F PR BODY MASS INDEX (BMI) DOCUMENTED: ICD-10-PCS | Mod: CPTII,S$GLB,, | Performed by: ALLERGY & IMMUNOLOGY

## 2021-10-18 PROCEDURE — 99204 OFFICE O/P NEW MOD 45 MIN: CPT | Mod: S$GLB,,, | Performed by: ALLERGY & IMMUNOLOGY

## 2021-10-18 PROCEDURE — 86003 ALLG SPEC IGE CRUDE XTRC EA: CPT | Performed by: ALLERGY & IMMUNOLOGY

## 2021-10-21 ENCOUNTER — IMMUNIZATION (OUTPATIENT)
Dept: PHARMACY | Facility: CLINIC | Age: 61
End: 2021-10-21
Payer: COMMERCIAL

## 2021-10-21 LAB
A ALTERNATA IGE QN: <0.1 KU/L
A FUMIGATUS IGE QN: <0.1 KU/L
BAHIA GRASS IGE QN: <0.1 KU/L
CAT DANDER IGE QN: 0.24 KU/L
CEDAR IGE QN: <0.1 KU/L
COMMON RAGWEED IGE QN: 0.13 KU/L
D FARINAE IGE QN: 2.15 KU/L
D PTERONYSS IGE QN: 1.57 KU/L
DEPRECATED A ALTERNATA IGE RAST QL: NORMAL
DEPRECATED A FUMIGATUS IGE RAST QL: NORMAL
DEPRECATED BAHIA GRASS IGE RAST QL: NORMAL
DEPRECATED CAT DANDER IGE RAST QL: ABNORMAL
DEPRECATED CEDAR IGE RAST QL: NORMAL
DEPRECATED COMMON RAGWEED IGE RAST QL: ABNORMAL
DEPRECATED D FARINAE IGE RAST QL: ABNORMAL
DEPRECATED D PTERONYSS IGE RAST QL: ABNORMAL
DEPRECATED DOG DANDER IGE RAST QL: ABNORMAL
DEPRECATED ELDER IGE RAST QL: NORMAL
DEPRECATED ENGL PLANTAIN IGE RAST QL: NORMAL
DEPRECATED PEAR IGE RAST QL: NORMAL
DEPRECATED PECAN/HICK TREE IGE RAST QL: NORMAL
DEPRECATED PORK IGE RAST QL: NORMAL
DEPRECATED ROACH IGE RAST QL: ABNORMAL
DEPRECATED STRAWBERRY IGE RAST QL: NORMAL
DEPRECATED TIMOTHY IGE RAST QL: NORMAL
DEPRECATED WHEAT IGE RAST QL: ABNORMAL
DEPRECATED WHITE OAK IGE RAST QL: NORMAL
DOG DANDER IGE QN: 0.33 KU/L
ELDER IGE QN: <0.1 KU/L
ENGL PLANTAIN IGE QN: <0.1 KU/L
PEAR IGE QN: <0.1 KU/L
PECAN/HICK TREE IGE QN: <0.1 KU/L
PORK IGE QN: <0.1 KU/L
ROACH IGE QN: 4.5 KU/L
STRAWBERRY IGE QN: <0.1 KU/L
TIMOTHY IGE QN: <0.1 KU/L
WHEAT IGE QN: 0.4 KU/L
WHITE OAK IGE QN: <0.1 KU/L

## 2021-10-22 ENCOUNTER — PATIENT MESSAGE (OUTPATIENT)
Dept: ALLERGY | Facility: CLINIC | Age: 61
End: 2021-10-22
Payer: COMMERCIAL

## 2021-11-10 ENCOUNTER — OFFICE VISIT (OUTPATIENT)
Dept: INTERNAL MEDICINE | Facility: CLINIC | Age: 61
End: 2021-11-10
Payer: COMMERCIAL

## 2021-11-10 VITALS
SYSTOLIC BLOOD PRESSURE: 122 MMHG | HEIGHT: 69 IN | BODY MASS INDEX: 27.85 KG/M2 | OXYGEN SATURATION: 99 % | DIASTOLIC BLOOD PRESSURE: 80 MMHG | WEIGHT: 188.06 LBS | HEART RATE: 83 BPM

## 2021-11-10 DIAGNOSIS — Z00.00 PREVENTATIVE HEALTH CARE: Primary | ICD-10-CM

## 2021-11-10 DIAGNOSIS — I10 ESSENTIAL HYPERTENSION: ICD-10-CM

## 2021-11-10 DIAGNOSIS — Z71.89 ACP (ADVANCE CARE PLANNING): ICD-10-CM

## 2021-11-10 DIAGNOSIS — Z23 NEED FOR VACCINATION: ICD-10-CM

## 2021-11-10 PROCEDURE — 99396 PREV VISIT EST AGE 40-64: CPT | Mod: 25,S$GLB,, | Performed by: INTERNAL MEDICINE

## 2021-11-10 PROCEDURE — 99999 PR PBB SHADOW E&M-EST. PATIENT-LVL IV: ICD-10-PCS | Mod: PBBFAC,,, | Performed by: INTERNAL MEDICINE

## 2021-11-10 PROCEDURE — 1159F PR MEDICATION LIST DOCUMENTED IN MEDICAL RECORD: ICD-10-PCS | Mod: CPTII,S$GLB,, | Performed by: INTERNAL MEDICINE

## 2021-11-10 PROCEDURE — 90471 IMMUNIZATION ADMIN: CPT | Mod: S$GLB,,, | Performed by: INTERNAL MEDICINE

## 2021-11-10 PROCEDURE — 3008F BODY MASS INDEX DOCD: CPT | Mod: CPTII,S$GLB,, | Performed by: INTERNAL MEDICINE

## 2021-11-10 PROCEDURE — 3079F PR MOST RECENT DIASTOLIC BLOOD PRESSURE 80-89 MM HG: ICD-10-PCS | Mod: CPTII,S$GLB,, | Performed by: INTERNAL MEDICINE

## 2021-11-10 PROCEDURE — 90471 FLU VACCINE (QUAD) GREATER THAN OR EQUAL TO 3YO PRESERVATIVE FREE IM: ICD-10-PCS | Mod: S$GLB,,, | Performed by: INTERNAL MEDICINE

## 2021-11-10 PROCEDURE — 99396 PR PREVENTIVE VISIT,EST,40-64: ICD-10-PCS | Mod: 25,S$GLB,, | Performed by: INTERNAL MEDICINE

## 2021-11-10 PROCEDURE — 90686 FLU VACCINE (QUAD) GREATER THAN OR EQUAL TO 3YO PRESERVATIVE FREE IM: ICD-10-PCS | Mod: S$GLB,,, | Performed by: INTERNAL MEDICINE

## 2021-11-10 PROCEDURE — 3079F DIAST BP 80-89 MM HG: CPT | Mod: CPTII,S$GLB,, | Performed by: INTERNAL MEDICINE

## 2021-11-10 PROCEDURE — 3074F PR MOST RECENT SYSTOLIC BLOOD PRESSURE < 130 MM HG: ICD-10-PCS | Mod: CPTII,S$GLB,, | Performed by: INTERNAL MEDICINE

## 2021-11-10 PROCEDURE — 3044F PR MOST RECENT HEMOGLOBIN A1C LEVEL <7.0%: ICD-10-PCS | Mod: CPTII,S$GLB,, | Performed by: INTERNAL MEDICINE

## 2021-11-10 PROCEDURE — 3008F PR BODY MASS INDEX (BMI) DOCUMENTED: ICD-10-PCS | Mod: CPTII,S$GLB,, | Performed by: INTERNAL MEDICINE

## 2021-11-10 PROCEDURE — 1159F MED LIST DOCD IN RCRD: CPT | Mod: CPTII,S$GLB,, | Performed by: INTERNAL MEDICINE

## 2021-11-10 PROCEDURE — 99999 PR PBB SHADOW E&M-EST. PATIENT-LVL IV: CPT | Mod: PBBFAC,,, | Performed by: INTERNAL MEDICINE

## 2021-11-10 PROCEDURE — 90686 IIV4 VACC NO PRSV 0.5 ML IM: CPT | Mod: S$GLB,,, | Performed by: INTERNAL MEDICINE

## 2021-11-10 PROCEDURE — 3074F SYST BP LT 130 MM HG: CPT | Mod: CPTII,S$GLB,, | Performed by: INTERNAL MEDICINE

## 2021-11-10 PROCEDURE — 3044F HG A1C LEVEL LT 7.0%: CPT | Mod: CPTII,S$GLB,, | Performed by: INTERNAL MEDICINE

## 2021-11-12 ENCOUNTER — DOCUMENTATION ONLY (OUTPATIENT)
Dept: PALLIATIVE MEDICINE | Facility: CLINIC | Age: 61
End: 2021-11-12
Payer: COMMERCIAL

## 2021-12-15 ENCOUNTER — HOSPITAL ENCOUNTER (OUTPATIENT)
Dept: RADIOLOGY | Facility: HOSPITAL | Age: 61
Discharge: HOME | End: 2021-12-15
Attending: NURSE PRACTITIONER
Payer: COMMERCIAL

## 2021-12-15 DIAGNOSIS — Z80.3 FAMILY HISTORY OF BREAST CANCER: ICD-10-CM

## 2021-12-15 DIAGNOSIS — Z91.89 INCREASED RISK OF BREAST CANCER: ICD-10-CM

## 2021-12-15 DIAGNOSIS — R92.30 DENSE BREAST TISSUE ON MAMMOGRAM: ICD-10-CM

## 2021-12-15 PROCEDURE — A9577 INJ MULTIHANCE: HCPCS | Performed by: NURSE PRACTITIONER

## 2021-12-15 PROCEDURE — 77049 MRI BREAST C-+ W/CAD BI: CPT | Mod: TC

## 2021-12-15 PROCEDURE — 77049 MRI BREAST C-+ W/CAD BI: CPT | Mod: 26,,, | Performed by: RADIOLOGY

## 2021-12-15 PROCEDURE — 25500020 PHARM REV CODE 255: Performed by: NURSE PRACTITIONER

## 2021-12-15 PROCEDURE — 77049 MRI BREAST W/WO CONTRAST, W/CAD, BILATERAL: ICD-10-PCS | Mod: 26,,, | Performed by: RADIOLOGY

## 2021-12-15 RX ADMIN — GADOBENATE DIMEGLUMINE 18 ML: 529 INJECTION, SOLUTION INTRAVENOUS at 02:12

## 2021-12-16 LAB
CREAT SERPL-MCNC: 0.7 MG/DL (ref 0.5–1.4)
SAMPLE: NORMAL

## 2022-01-14 NOTE — TELEPHONE ENCOUNTER
No new care gaps identified.  Powered by [employer] ([employer]) by [employer]. Reference number: [id number].   1/14/2022 9:38:32 AM CST

## 2022-01-19 ENCOUNTER — OFFICE VISIT (OUTPATIENT)
Dept: HEMATOLOGY/ONCOLOGY | Facility: CLINIC | Age: 62
End: 2022-01-19
Payer: COMMERCIAL

## 2022-01-19 VITALS
OXYGEN SATURATION: 99 % | HEIGHT: 69 IN | BODY MASS INDEX: 28.71 KG/M2 | WEIGHT: 193.81 LBS | RESPIRATION RATE: 16 BRPM | DIASTOLIC BLOOD PRESSURE: 90 MMHG | TEMPERATURE: 98 F | HEART RATE: 77 BPM | SYSTOLIC BLOOD PRESSURE: 149 MMHG

## 2022-01-19 DIAGNOSIS — R92.30 DENSE BREAST TISSUE ON MAMMOGRAM: ICD-10-CM

## 2022-01-19 DIAGNOSIS — Z91.89 INCREASED RISK OF BREAST CANCER: Primary | ICD-10-CM

## 2022-01-19 DIAGNOSIS — Z80.3 FAMILY HISTORY OF BREAST CANCER: ICD-10-CM

## 2022-01-19 PROCEDURE — 3077F PR MOST RECENT SYSTOLIC BLOOD PRESSURE >= 140 MM HG: ICD-10-PCS | Mod: CPTII,S$GLB,, | Performed by: NURSE PRACTITIONER

## 2022-01-19 PROCEDURE — 3080F DIAST BP >= 90 MM HG: CPT | Mod: CPTII,S$GLB,, | Performed by: NURSE PRACTITIONER

## 2022-01-19 PROCEDURE — 3077F SYST BP >= 140 MM HG: CPT | Mod: CPTII,S$GLB,, | Performed by: NURSE PRACTITIONER

## 2022-01-19 PROCEDURE — 99214 OFFICE O/P EST MOD 30 MIN: CPT | Mod: S$GLB,,, | Performed by: NURSE PRACTITIONER

## 2022-01-19 PROCEDURE — 1159F PR MEDICATION LIST DOCUMENTED IN MEDICAL RECORD: ICD-10-PCS | Mod: CPTII,S$GLB,, | Performed by: NURSE PRACTITIONER

## 2022-01-19 PROCEDURE — 1159F MED LIST DOCD IN RCRD: CPT | Mod: CPTII,S$GLB,, | Performed by: NURSE PRACTITIONER

## 2022-01-19 PROCEDURE — 99999 PR PBB SHADOW E&M-EST. PATIENT-LVL IV: ICD-10-PCS | Mod: PBBFAC,,, | Performed by: NURSE PRACTITIONER

## 2022-01-19 PROCEDURE — 3080F PR MOST RECENT DIASTOLIC BLOOD PRESSURE >= 90 MM HG: ICD-10-PCS | Mod: CPTII,S$GLB,, | Performed by: NURSE PRACTITIONER

## 2022-01-19 PROCEDURE — 99214 PR OFFICE/OUTPT VISIT, EST, LEVL IV, 30-39 MIN: ICD-10-PCS | Mod: S$GLB,,, | Performed by: NURSE PRACTITIONER

## 2022-01-19 PROCEDURE — 99999 PR PBB SHADOW E&M-EST. PATIENT-LVL IV: CPT | Mod: PBBFAC,,, | Performed by: NURSE PRACTITIONER

## 2022-01-19 PROCEDURE — 3008F BODY MASS INDEX DOCD: CPT | Mod: CPTII,S$GLB,, | Performed by: NURSE PRACTITIONER

## 2022-01-19 PROCEDURE — 3008F PR BODY MASS INDEX (BMI) DOCUMENTED: ICD-10-PCS | Mod: CPTII,S$GLB,, | Performed by: NURSE PRACTITIONER

## 2022-01-19 NOTE — PROGRESS NOTES
Reason For Consultation:   Increased lifetime risk of breast cancer    Referring Provider:   No referring provider defined for this encounter.    Records Obtained: Records of the patients history including those obtained from the referring provider were reviewed and summarized in detail.    HPI:   Leschia T Bastian presents for follow up risk of breast cancer.       Today, Feels good and no complaints.   No breast concerns.      12/15/2021 MRI breast   Impression:  Bilateral  There is no MR evidence of malignancy.     BI-RADS Category:   Overall: 1 - Negative     Recommendation:  Return to annual screening mammogram schedule is recommended     Your estimated lifetime risk of breast cancer (to age 85) based on Tyrer-Cuzick risk assessment model is Tyrer-Cuzick: 20.37 %. According to the American Cancer Society, patients with a lifetime breast cancer risk of 20% or higher might benefit from supplemental screening tests.       2021 MMG   Impression:  Bilateral  There is no MR evidence of malignancy.     BI-RADS Category:   Overall: 1 - Negative     Recommendation:  Return to annual screening mammogram schedule is recommended     Your estimated lifetime risk of breast cancer (to age 85) based on Tyrer-Cuzick risk assessment model is Tyrer-Cuzick: 20.37 %. According to the American Cancer Society, patients with a lifetime breast cancer risk of 20% or higher might benefit from supplemental screening tests.         High Risk Breast cancer specific history:  - Age: 61 y.o.   - Height:  5'9  - Weight: 195 lbs  - Breast density per BI-RADS:  b - Scattered fibroglandular density  - Age at menarche:  12 yo  - Number of pregnancies: ; age of first live birth: 21 yo  - History of breast feeding: No.   - Age at menopause, if applicable:  56 yo  -Uterus and ovaries intact: No: ovaries and uterus removed 2018  - HRT: No  - Genetic testing: No  - Personal history of cancer: No  - Previous chest radiation exposure between  ages 10-30 years old: No  - Personal history of breast biopsy: No  - [employer] Inheritance: No  - Family history of cancer:    Mother: Breast cancer, age 53. current 83. Genetic testing negative  Sister(s): Breast cancer, age 54. Current 56. Genetic testing negative.  Maternal aunt: Breast cancer  Maternal 2nd cousin: ovarian cancer        Social History:  Tobacco use:  no  Alcohol use:  social  Exercise regimen: no  Employment: works in reserve    SEE CALCULATED RISK BELOW.     Past Medical   Past Medical History:   Diagnosis Date    Atypical chest pain     Cervical radiculopathy     COVID-19     GERD (gastroesophageal reflux disease)     Hypertension     Kidney cyst, acquired 1/28/2021    Lumbar radiculopathy     JAYLON on CPAP     Seasonal allergies     Shortness of breath      Patient Active Problem List   Diagnosis    Essential hypertension    GERD (gastroesophageal reflux disease)    Obstructive sleep apnea treated with continuous positive airway pressure (CPAP)    Mild vitamin D deficiency    Shortness of breath    WELLS (dyspnea on exertion)    S/P ROLDAN-BSO    Abnormal increased muscle tightness    Biceps tendonitis on right    History of COVID-19    Increased risk of breast cancer    Right-sided chest pain    Overweight (BMI 25.0-29.9)    Elevated liver enzymes    Fatty liver    Kidney cyst, acquired     Social History   Social History     Tobacco Use    Smoking status: Never Smoker    Smokeless tobacco: Never Used   Substance Use Topics    Drug use: No     Family History  Family History   Problem Relation Age of Onset    Breast cancer Mother 53    Cancer Mother         breast cancer    Hypertension Mother     Ovarian cancer Cousin     Cancer Cousin         ovarian    Hypertension Sister     Breast cancer Sister 52    Glaucoma Father     Hypertension Father     Breast cancer Maternal Cousin     Breast cancer Maternal Cousin     Heart disease Maternal Grandfather      Heart disease Paternal Grandfather     Colon cancer Brother 46     Medications    Current Outpatient Medications:     amLODIPine (NORVASC) 5 MG tablet, TAKE ONE TABLET BY MOUTH EVERY DAY (STOP LOSARTAN), Disp: 90 tablet, Rfl: 0    dicyclomine (BENTYL) 10 MG capsule, Take 10 mg by mouth 2 (two) times daily., Disp: , Rfl:     EPINEPHrine (EPIPEN) 0.3 mg/0.3 mL AtIn, Inject contents of 0.3-mg prefilled syringe for anaphylaxis (Patient taking differently: Inject contents of 0.3-mg prefilled syringe for anaphylaxis), Disp: 2 each, Rfl: 1    ergocalciferol (ERGOCALCIFEROL) 50,000 unit Cap, Take 1 capsule (50,000 Units total) by mouth every 30 days., Disp: 3 capsule, Rfl: 4    fluticasone propionate (FLONASE) 50 mcg/actuation nasal spray, 1 spray (50 mcg total) by Each Nostril route 2 (two) times daily., Disp: 18.2 mL, Rfl: 0    hydrocortisone 2.5 % cream, , Disp: , Rfl:     ketoconazole (NIZORAL) 2 % cream, , Disp: , Rfl:     LINZESS 72 mcg Cap capsule, Take 72 mcg by mouth once daily., Disp: , Rfl:     loratadine (CLARITIN) 10 mg tablet, Take 1 tablet (10 mg total) by mouth once daily., Disp: 30 tablet, Rfl: 1    RABEprazole (ACIPHEX) 20 mg tablet, Take 20 mg by mouth once daily., Disp: , Rfl:     triamcinolone acetonide 0.1% (KENALOG) 0.1 % cream, , Disp: , Rfl:     albuterol (PROVENTIL/VENTOLIN HFA) 90 mcg/actuation inhaler, Inhale 2 puffs into the lungs every 6 (six) hours as needed for Wheezing. Rescue (Patient not taking: Reported on 1/5/2021), Disp: 18 g, Rfl: 2  Allergies  Review of patient's allergies indicates:   Allergen Reactions    Clams      Allergy testing confirmed    Codeine Itching    Scallops     Shrimp      Peeling shrimp, got a rash, allergy test confirmed       Review of Systems       See above   All other systems reviewed and are negative.    Objective:      Vitals:   Vitals:    01/19/22 1307   BP: (!) 149/90   BP Location: Right arm   Patient Position: Sitting   BP Method:  "Large (Automatic)   Pulse: 77   Resp: 16   Temp: 98.3 °F (36.8 °C)   TempSrc: Oral   SpO2: 99%   Weight: 87.9 kg (193 lb 12.6 oz)   Height: 5' 9" (1.753 m)     BMI: Body mass index is 28.62 kg/m².   Body surface area is 2.07 meters squared.    Physical Exam  Vitals signs reviewed.   Constitutional:  Normal appearance. NAD.   HENT: Normocephalic.   Eyes: Pupils are equal, round, and reactive to light.   Cardiovascular: Normal rate. RRR  Pulmonary: Pulmonary effort is normal. CTA  Musculoskeletal: Normal range of motion.   Lymphadenopathy: No cervical adenopathy. No axillary adenopathy.   Skin: Skin is warm and dry.   Neurological:  She is alert and oriented to person, place, and time.   Psychiatric: Mood normal.     Breast Exam: Bilateral breast normal. No masses, no tenderness, no skin or nipple abnormalities.  No lymphadenopathy.       Laboratory Data: reviewed most recent   Imaging: reviewed most recent        Assessment:     1. Increased risk of breast cancer    2. Family history of breast cancer    3. Dense breast tissue on mammogram            Plan:     Clinically negative.   MMG due 5/2022  MRI 11/2022  RTC end of 11/2022 to see me either at [facility] or on 3rd floor with a MRI breast a couple of days prior.     Questions were encouraged and answered to patient's satisfaction, and patient verbalized understanding of information and agreement with the plan. Advised patient to RTC with any interval changes or concerns.      Patient is in agreement with the proposed treatment plan. All questions were answered to the patient's satisfaction. Pt knows to call clinic for any new or worsening symptoms and if anything is needed before the next clinic visit.      Layla Capellan, MICHELLP-C  Hematology & Oncology  [facility] [place] [address]  ph. [phone]  Fax. [phone]     Face to Face time with patient: 25 minutes  35  minutes of total time spent on the encounter, which includes face to face " time and non-face to face time preparing to see the patient (eg, review of tests), Obtaining and/or reviewing separately obtained history, Documenting clinical information in the electronic or other health record, Independently interpreting results (not separately reported) and communicating results to the patient/family/caregiver, or Care coordination (not separately reported).

## 2022-01-19 NOTE — Clinical Note
MMG due 5/2022 RTC end of 11/2022 to see me either at [facility] or on 3rd floor with a MRI breast a couple of days prior.

## 2022-01-25 RX ORDER — AMLODIPINE BESYLATE 5 MG/1
TABLET ORAL
Qty: 90 TABLET | Refills: 1 | Status: SHIPPED | OUTPATIENT
Start: 2022-01-25 | End: 2022-06-25 | Stop reason: SDUPTHER

## 2022-01-25 NOTE — TELEPHONE ENCOUNTER
This Rx Request does not qualify for assessment with the OR.    Please review protocol details and the Care Due Message for extra clinical information    Reasons Rx Request may be deferred:  1. Labs/Vitals overdue  2. Labs/Vitals abnormal  3. Patient has been seen in the ED/Hospital since the last PCP visit  4. Medication is non-delegated  5. Provider is a non-participating provider     no

## 2022-02-10 ENCOUNTER — TELEPHONE (OUTPATIENT)
Dept: SLEEP MEDICINE | Facility: CLINIC | Age: 62
End: 2022-02-10
Payer: COMMERCIAL

## 2022-02-10 NOTE — TELEPHONE ENCOUNTER
Staff reached out to pt to inform her she may be eligible for a new machine through insurance, to keep her appointment for the 18th and to avoid back sleep with the machine. Pt confirmed.

## 2022-02-10 NOTE — TELEPHONE ENCOUNTER
----- Message from Jerry Pickard sent at 2/10/2022  3:19 PM CST -----  Type:  Patient Returning Call    Who Called:     Who Left Message for Patient:     Does the patient know what this is regarding?: missed call     Best Call Back Number:  [phone]    Additional Information:
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
## 2022-02-10 NOTE — TELEPHONE ENCOUNTER
----- Message from Sindhu Cobb sent at 2/10/2022  2:58 PM CST -----  Who Called: BASTIAN, LESCHIA     What is the request in detail: Would like to speak to staff in regards to the recall on her CPAP machine. Please advise.     Can the clinic reply by [facility]?: Yes    Best Call Back Number: [phone]

## 2022-02-16 ENCOUNTER — PATIENT OUTREACH (OUTPATIENT)
Dept: ADMINISTRATIVE | Facility: OTHER | Age: 62
End: 2022-02-16
Payer: COMMERCIAL

## 2022-02-16 NOTE — PROGRESS NOTES
Health Maintenance Due   Topic Date Due    Pneumococcal Vaccines (Age 0-64) (1 of 2 - PPSV23) Never done    HIV Screening  Never done     Updates were requested from care everywhere.  Chart was reviewed for overdue Proactive [facility] Encounters ([facility]) topics (CRS, Breast Cancer Screening, Eye exam)  Health Maintenance has been updated.  LINKS immunization registry triggered.  Immunizations were reconciled.

## 2022-02-17 ENCOUNTER — TELEPHONE (OUTPATIENT)
Dept: SLEEP MEDICINE | Facility: CLINIC | Age: 62
End: 2022-02-17
Payer: COMMERCIAL

## 2022-02-17 NOTE — TELEPHONE ENCOUNTER
Reached out to pt to remind them of their appointment for 2/18/22 for 7:40 am . Also, reminded them to complete their sleep questionnaire prior to their visit and bring their sleep machine if they have one. Pt confirmed.

## 2022-02-18 ENCOUNTER — OFFICE VISIT (OUTPATIENT)
Dept: SLEEP MEDICINE | Facility: CLINIC | Age: 62
End: 2022-02-18
Payer: COMMERCIAL

## 2022-02-18 DIAGNOSIS — G47.33 OSA (OBSTRUCTIVE SLEEP APNEA): ICD-10-CM

## 2022-02-18 DIAGNOSIS — R74.8 ELEVATED LIVER ENZYMES: Primary | ICD-10-CM

## 2022-02-18 PROCEDURE — 99213 PR OFFICE/OUTPT VISIT, EST, LEVL III, 20-29 MIN: ICD-10-PCS | Mod: S$GLB,,, | Performed by: NURSE PRACTITIONER

## 2022-02-18 PROCEDURE — 99999 PR PBB SHADOW E&M-EST. PATIENT-LVL I: ICD-10-PCS | Mod: PBBFAC,,, | Performed by: NURSE PRACTITIONER

## 2022-02-18 PROCEDURE — 99999 PR PBB SHADOW E&M-EST. PATIENT-LVL I: CPT | Mod: PBBFAC,,, | Performed by: NURSE PRACTITIONER

## 2022-02-18 PROCEDURE — 99213 OFFICE O/P EST LOW 20 MIN: CPT | Mod: S$GLB,,, | Performed by: NURSE PRACTITIONER
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
## 2022-04-08 ENCOUNTER — PATIENT MESSAGE (OUTPATIENT)
Dept: ALLERGY | Facility: CLINIC | Age: 62
End: 2022-04-08
Payer: COMMERCIAL

## 2022-04-08 ENCOUNTER — LAB VISIT (OUTPATIENT)
Dept: LAB | Facility: HOSPITAL | Age: 62
End: 2022-04-08
Attending: INTERNAL MEDICINE
Payer: COMMERCIAL

## 2022-04-08 DIAGNOSIS — I10 ESSENTIAL HYPERTENSION: ICD-10-CM

## 2022-04-08 LAB
ALBUMIN SERPL BCP-MCNC: 4.6 G/DL (ref 3.5–5.2)
ALP SERPL-CCNC: 93 U/L (ref 38–126)
ALT SERPL W/O P-5'-P-CCNC: 61 U/L (ref 10–44)
ANION GAP SERPL CALC-SCNC: 11 MMOL/L (ref 8–16)
AST SERPL-CCNC: 43 U/L (ref 15–46)
BILIRUB SERPL-MCNC: 0.7 MG/DL (ref 0.1–1)
CALCIUM SERPL-MCNC: 9.2 MG/DL (ref 8.7–10.5)
CHLORIDE SERPL-SCNC: 103 MMOL/L (ref 95–110)
CO2 SERPL-SCNC: 28 MMOL/L (ref 23–29)
CREAT SERPL-MCNC: 0.77 MG/DL (ref 0.5–1.4)
EST. GFR  (AFRICAN AMERICAN): >60 ML/MIN/1.73 M^2
EST. GFR  (NON AFRICAN AMERICAN): >60 ML/MIN/1.73 M^2
GLUCOSE SERPL-MCNC: 96 MG/DL (ref 70–110)
POTASSIUM SERPL-SCNC: 4.5 MMOL/L (ref 3.5–5.1)
PROT SERPL-MCNC: 8 G/DL (ref 6–8.4)
SODIUM SERPL-SCNC: 142 MMOL/L (ref 136–145)
UUN UR-MCNC: 13 MG/DL (ref 7–17)

## 2022-04-08 PROCEDURE — 80076 HEPATIC FUNCTION PANEL: CPT | Mod: PO | Performed by: INTERNAL MEDICINE

## 2022-04-08 PROCEDURE — 80048 BASIC METABOLIC PNL TOTAL CA: CPT | Mod: PO | Performed by: INTERNAL MEDICINE

## 2022-04-08 PROCEDURE — 36415 COLL VENOUS BLD VENIPUNCTURE: CPT | Mod: PO | Performed by: INTERNAL MEDICINE

## 2022-04-13 ENCOUNTER — LAB VISIT (OUTPATIENT)
Dept: LAB | Facility: HOSPITAL | Age: 62
End: 2022-04-13
Attending: INTERNAL MEDICINE
Payer: COMMERCIAL

## 2022-04-13 ENCOUNTER — OFFICE VISIT (OUTPATIENT)
Dept: INTERNAL MEDICINE | Facility: CLINIC | Age: 62
End: 2022-04-13
Payer: COMMERCIAL

## 2022-04-13 ENCOUNTER — TELEPHONE (OUTPATIENT)
Dept: INTERNAL MEDICINE | Facility: CLINIC | Age: 62
End: 2022-04-13
Payer: COMMERCIAL

## 2022-04-13 VITALS
OXYGEN SATURATION: 99 % | HEIGHT: 69 IN | DIASTOLIC BLOOD PRESSURE: 84 MMHG | RESPIRATION RATE: 16 BRPM | SYSTOLIC BLOOD PRESSURE: 120 MMHG | WEIGHT: 192.88 LBS | HEART RATE: 79 BPM | BODY MASS INDEX: 28.57 KG/M2

## 2022-04-13 DIAGNOSIS — R53.83 FATIGUE, UNSPECIFIED TYPE: ICD-10-CM

## 2022-04-13 DIAGNOSIS — R53.83 FATIGUE, UNSPECIFIED TYPE: Primary | ICD-10-CM

## 2022-04-13 DIAGNOSIS — R06.02 SOB (SHORTNESS OF BREATH): ICD-10-CM

## 2022-04-13 LAB
BASOPHILS # BLD AUTO: 0.03 K/UL (ref 0–0.2)
BASOPHILS NFR BLD: 0.6 % (ref 0–1.9)
BNP SERPL-MCNC: 24 PG/ML (ref 0–99)
DIFFERENTIAL METHOD: ABNORMAL
EOSINOPHIL # BLD AUTO: 0.1 K/UL (ref 0–0.5)
EOSINOPHIL NFR BLD: 1.7 % (ref 0–8)
ERYTHROCYTE [DISTWIDTH] IN BLOOD BY AUTOMATED COUNT: 13.7 % (ref 11.5–14.5)
FERRITIN SERPL-MCNC: 217 NG/ML (ref 20–300)
HCT VFR BLD AUTO: 42.6 % (ref 37–48.5)
HGB BLD-MCNC: 12.3 G/DL (ref 12–16)
IMM GRANULOCYTES # BLD AUTO: 0.01 K/UL (ref 0–0.04)
IMM GRANULOCYTES NFR BLD AUTO: 0.2 % (ref 0–0.5)
IRON SERPL-MCNC: 64 UG/DL (ref 30–160)
LYMPHOCYTES # BLD AUTO: 2.3 K/UL (ref 1–4.8)
LYMPHOCYTES NFR BLD: 49.4 % (ref 18–48)
MCH RBC QN AUTO: 22.4 PG (ref 27–31)
MCHC RBC AUTO-ENTMCNC: 28.9 G/DL (ref 32–36)
MCV RBC AUTO: 78 FL (ref 82–98)
MONOCYTES # BLD AUTO: 0.4 K/UL (ref 0.3–1)
MONOCYTES NFR BLD: 8.8 % (ref 4–15)
NEUTROPHILS # BLD AUTO: 1.8 K/UL (ref 1.8–7.7)
NEUTROPHILS NFR BLD: 39.3 % (ref 38–73)
NRBC BLD-RTO: 0 /100 WBC
PLATELET # BLD AUTO: 257 K/UL (ref 150–450)
PMV BLD AUTO: 11.4 FL (ref 9.2–12.9)
RBC # BLD AUTO: 5.5 M/UL (ref 4–5.4)
SATURATED IRON: 15 % (ref 20–50)
TOTAL IRON BINDING CAPACITY: 419 UG/DL (ref 250–450)
TRANSFERRIN SERPL-MCNC: 283 MG/DL (ref 200–375)
WBC # BLD AUTO: 4.66 K/UL (ref 3.9–12.7)

## 2022-04-13 PROCEDURE — 99214 OFFICE O/P EST MOD 30 MIN: CPT | Mod: S$GLB,,, | Performed by: INTERNAL MEDICINE

## 2022-04-13 PROCEDURE — 85025 COMPLETE CBC W/AUTO DIFF WBC: CPT | Performed by: INTERNAL MEDICINE

## 2022-04-13 PROCEDURE — 99214 PR OFFICE/OUTPT VISIT, EST, LEVL IV, 30-39 MIN: ICD-10-PCS | Mod: S$GLB,,, | Performed by: INTERNAL MEDICINE

## 2022-04-13 PROCEDURE — 3074F SYST BP LT 130 MM HG: CPT | Mod: CPTII,S$GLB,, | Performed by: INTERNAL MEDICINE

## 2022-04-13 PROCEDURE — 3074F PR MOST RECENT SYSTOLIC BLOOD PRESSURE < 130 MM HG: ICD-10-PCS | Mod: CPTII,S$GLB,, | Performed by: INTERNAL MEDICINE

## 2022-04-13 PROCEDURE — 83880 ASSAY OF NATRIURETIC PEPTIDE: CPT | Performed by: INTERNAL MEDICINE

## 2022-04-13 PROCEDURE — 1159F PR MEDICATION LIST DOCUMENTED IN MEDICAL RECORD: ICD-10-PCS | Mod: CPTII,S$GLB,, | Performed by: INTERNAL MEDICINE

## 2022-04-13 PROCEDURE — 1159F MED LIST DOCD IN RCRD: CPT | Mod: CPTII,S$GLB,, | Performed by: INTERNAL MEDICINE

## 2022-04-13 PROCEDURE — 82728 ASSAY OF FERRITIN: CPT | Performed by: INTERNAL MEDICINE

## 2022-04-13 PROCEDURE — 99999 PR PBB SHADOW E&M-EST. PATIENT-LVL V: CPT | Mod: PBBFAC,,, | Performed by: INTERNAL MEDICINE

## 2022-04-13 PROCEDURE — 3008F PR BODY MASS INDEX (BMI) DOCUMENTED: ICD-10-PCS | Mod: CPTII,S$GLB,, | Performed by: INTERNAL MEDICINE

## 2022-04-13 PROCEDURE — 3008F BODY MASS INDEX DOCD: CPT | Mod: CPTII,S$GLB,, | Performed by: INTERNAL MEDICINE

## 2022-04-13 PROCEDURE — 3079F PR MOST RECENT DIASTOLIC BLOOD PRESSURE 80-89 MM HG: ICD-10-PCS | Mod: CPTII,S$GLB,, | Performed by: INTERNAL MEDICINE

## 2022-04-13 PROCEDURE — 3079F DIAST BP 80-89 MM HG: CPT | Mod: CPTII,S$GLB,, | Performed by: INTERNAL MEDICINE

## 2022-04-13 PROCEDURE — 84466 ASSAY OF TRANSFERRIN: CPT | Performed by: INTERNAL MEDICINE

## 2022-04-13 PROCEDURE — 99999 PR PBB SHADOW E&M-EST. PATIENT-LVL V: ICD-10-PCS | Mod: PBBFAC,,, | Performed by: INTERNAL MEDICINE

## 2022-04-13 PROCEDURE — 36415 COLL VENOUS BLD VENIPUNCTURE: CPT | Mod: PO | Performed by: INTERNAL MEDICINE

## 2022-04-13 RX ORDER — ESOMEPRAZOLE MAGNESIUM 40 MG/1
40 CAPSULE, DELAYED RELEASE ORAL DAILY
COMMUNITY
Start: 2022-04-07 | End: 2023-05-19

## 2022-04-13 NOTE — TELEPHONE ENCOUNTER
Patient states that she does not have any SOB and that when she was experiencing it 4 weeks ago she was unable to get an appointment. Patient states she is only coming in to be seen for her annual.

## 2022-04-13 NOTE — TELEPHONE ENCOUNTER
----- Message from Eddie Woodward III, MD sent at 4/13/2022  6:49 AM CDT -----  Regarding: cc: SOB, can we call to make sure pt doesnt need evaluation in the ED  cc: SOB, can we call to make sure pt doesnt need evaluation in the ED

## 2022-04-13 NOTE — PROGRESS NOTES
Subjective:       Patient ID: Leschia T Bastian is a 61 y.o. female.    Chief Complaint:   Annual Exam      HPI        Link to History           #Interim Hx    Sleep medicine - JAYLON       #Concerns Today    Patient reports some shortness of breath with sleep and with activity.  She states that this started after COVID.  She has had prior workup for chest pain including testing as below, stress test, EKG, chest x-ray, CT scan which was unremarkable she has had follow-up with her sleep medicine doctor.      #Chronic Problems    HTN  Complication: no CVA/CKD/CAD  Dx; late 40s   Last labs :   - BMP  Lab Results   Component Value Date     04/08/2022    K 4.5 04/08/2022     04/08/2022    CO2 28 04/08/2022    BUN 13 04/08/2022    CREATININE 0.77 04/08/2022    CALCIUM 9.2 04/08/2022    ANIONGAP 11 04/08/2022    ESTGFRAFRICA >60.0 04/08/2022    EGFRNONAA >60.0 04/08/2022     Medication: adherent to   - amlodipine 5 mg daily   - hctz 12.5 STOPPED TAKING   Home Bps; has bp cuff, not checking   Symptoms: denies CP, SOB, changes in urination, sudden weakness, numbness      GERD - hiatal hernia , has GI doctor at [facility]     JAYLON on CPAP - overdue for sleep medicine     Vitamin D def- takes vitamin D     Fatty liver  -- Dx: 2021 seen by  hepatology seen   -- Fibrosis score : -1.86  -- signs of Cirrhosis  -- RF management        Health Maintenance Due   Topic Date Due    HIV Screening  Never done    Mammogram  05/11/2022       Health Maintenance Topics with due status: Not Due       Topic Last Completion Date    TETANUS VACCINE 08/03/2017    Lipid Panel 01/04/2021    Colorectal Cancer Screening 01/18/2021   S/p Hysterectomy             Tobacco Use: Low Risk     Smoking Tobacco Use: Never Smoker    Smokeless Tobacco Use: Never Used               Results for orders placed during the hospital encounter of 05/11/21    Mammo Digital Screening Bilat w/ Murray    Narrative  Result:  Mammo Digital Screening Bilat w/  "Murray    History:  Patient is 60 y.o. and is seen for a screening mammogram.    Films Compared:  Prior images (if available) were compared.    Findings:  This procedure was performed using tomosynthesis. Computer-aided detection was utilized in the interpretation of this examination.  The breasts have scattered areas of fibroglandular density. There is no evidence of suspicious masses, calcifications, or other abnormal findings. There are benign-appearing calcifications in the breasts.    Impression  Bilateral  There is no mammographic evidence of malignancy.    BI-RADS Category:  Overall: 2 - Benign    Recommendation:  Routine screening mammogram in 1 year is recommended.      Your estimated lifetime risk of breast cancer (to age 85) based on Tyrer-Cuzick risk assessment model is Tyrer-Cuzick: 20.37 %. According to the American Cancer Society, patients with a lifetime breast cancer risk of 20% or higher might benefit from supplemental screening tests.          Review of Systems   All other systems reviewed and are negative.      Objective:   /84 (BP Location: Right arm, Patient Position: Sitting, BP Method: Medium (Manual))   Pulse 79   Resp 16   Ht 5' 9" (1.753 m)   Wt 87.5 kg (192 lb 14.4 oz)   LMP 05/02/2018   SpO2 99%   BMI 28.49 kg/m²     Vitals 4/13/2022 1/19/2022 11/10/2021 10/18/2021 10/7/2021   Height 69 69 69 69 -   Weight (lbs) 192.9 193.78 188.05 182.98 188.27   BMI (kg/m2) 28.5 28.6 27.8 27 -          Physical Exam  Vitals and nursing note reviewed.   Constitutional:       General: She is not in acute distress.     Appearance: She is well-developed. She is not diaphoretic.   HENT:      Head: Normocephalic.      Nose: Nose normal.   Eyes:      General:         Right eye: No discharge.         Left eye: No discharge.      Conjunctiva/sclera: Conjunctivae normal.      Pupils: Pupils are equal, round, and reactive to light.   Cardiovascular:      Rate and Rhythm: Normal rate and regular rhythm. "      Heart sounds: Normal heart sounds. No murmur heard.    No friction rub. No gallop.   Pulmonary:      Effort: Pulmonary effort is normal. No respiratory distress.   Abdominal:      General: Bowel sounds are normal. There is no distension.      Palpations: Abdomen is soft.   Musculoskeletal:         General: No deformity. Normal range of motion.      Cervical back: Normal range of motion.   Skin:     General: Skin is warm.   Neurological:      Mental Status: She is alert and oriented to person, place, and time.      Cranial Nerves: No cranial nerve deficit.             ASCVD  The 10-year ASCVD risk score (Marshallpranay BANG Jr., et al., 2013) is: 5.2%    Values used to calculate the score:      Age: 61 years      Sex: Female      Is Non- : Yes      Diabetic: No      Tobacco smoker: No      Systolic Blood Pressure: 120 mmHg      Is BP treated: Yes      HDL Cholesterol: 60 mg/dL      Total Cholesterol: 179 mg/dL    Basic Labs  BMP  Lab Results   Component Value Date     04/08/2022    K 4.5 04/08/2022     04/08/2022    CO2 28 04/08/2022    BUN 13 04/08/2022    CREATININE 0.77 04/08/2022    CALCIUM 9.2 04/08/2022    ANIONGAP 11 04/08/2022    ESTGFRAFRICA >60.0 04/08/2022    EGFRNONAA >60.0 04/08/2022     Lab Results   Component Value Date    ALT 61 (H) 04/08/2022    AST 43 04/08/2022    ALKPHOS 93 04/08/2022    BILITOT 0.7 04/08/2022       Lab Results   Component Value Date    TSH 1.111 06/01/2020       Lab Results   Component Value Date    WBC 7.86 07/19/2021    HGB 12.0 07/19/2021    HCT 40.6 07/19/2021    MCV 77 (L) 07/19/2021     07/19/2021           STD  No results found for: HIV1X2, DMF23MNTD  No results found for: RPR  Lab Results   Component Value Date    HEPAIGM Negative 01/15/2021    HEPBIGM Negative 01/15/2021    HEPBCAB Negative 01/15/2021    HEPCAB Negative 01/15/2021     Lab Results   Component Value Date    LABNGO Not Detected 04/20/2018    LABCHLA Not Detected  04/20/2018         Lipids  Lab Results   Component Value Date    CHOL 179 01/04/2021     Lab Results   Component Value Date    HDL 60 01/04/2021     Lab Results   Component Value Date    LDLCALC 105.4 01/04/2021     Lab Results   Component Value Date    TRIG 68 01/04/2021     Lab Results   Component Value Date    CHOLHDL 33.5 01/04/2021       DM  Lab Results   Component Value Date    HGBA1C 5.3 01/15/2021         Mild restriction although it is normal spirometry   Written by Karina Gomez MD on 5/23/2018 11:52 AM CDT        CONCLUSIONS     1 - Normal left ventricular systolic function (EF 55-60%).     2 - Normal left ventricular diastolic function.     3 - Normal right ventricular systolic function .     4 - The estimated PA systolic pressure is 23 mmHg.   No evidence of stress induced myocardial ischemia.   This document has been electronically    SIGNED BY: Mohan Kraus MD On: 06/12/2018 14:09      Impression:     Old granulomatous disease.  Prior cholecystectomy.     No pneumonia or other source for chest pain identified.        Electronically signed by: Gladys Abbott MD  Date:                                            01/04/2021  Time:                      FINDINGS:  Comparison study 03/28/2019.  No change.  Normal size heart.  Lungs are clear.  Thoracic spondylosis.     Impression:     Negative chest x-ray.        Electronically signed by: Dustin Lemus MD  Date:                                            06/11/2020  Time:                                           12:02      Normal sinus rhythm   Normal ECG   When compared with ECG of 01-MAY-2018 17:44,   Nonspecific T wave abnormality now evident in Inferior leads   Confirmed by LAURY ARGUETA MD ([id number]) on 1/5/2021 12:02:36 PM       Assessment:       1. Fatigue, unspecified type    2. SOB (shortness of breath)              Plan:           1. Fatigue, unspecified type  Check labs  - BNP; Future  - CBC Auto Differential; Future  - Ferritin; Future  - Iron and  TIBC; Future    2. SOB (shortness of breath)    New   Uncontrolled  Signs, symptoms consistent with long haul  history or pyhsical exam do not suggest acute acs, vte  DDx includes but not limited to   I provided instruction on supportive care measures   Prior tesing reviewed and new testing was was given  no change   reviewed signs and symptoms that should prompt return to provider or evaluation in the ED  - Ambulatory referral/consult to  Clinic; Future        Future Appointments   Date Time Provider Department Center   5/12/2022  7:45 AM [facility]   7/5/2022  9:30 AM Nidia Bradley MD [facility]   10/12/2022  3:00 PM Eddie Woodward III, MD [facility]           Medication List with Changes/Refills   Current Medications    ALBUTEROL (PROVENTIL/VENTOLIN HFA) 90 MCG/ACTUATION INHALER    Inhale 2 puffs into the lungs every 6 (six) hours as needed for Wheezing. Rescue    AMLODIPINE (NORVASC) 5 MG TABLET    TAKE 1 TABLET BY MOUTH EVERY DAY. STOP LOSARTAN    DICYCLOMINE (BENTYL) 10 MG CAPSULE    Take 10 mg by mouth 2 (two) times daily.    EPINEPHRINE (EPIPEN) 0.3 MG/0.3 ML ATIN    Inject contents of 0.3-mg prefilled syringe for anaphylaxis    ERGOCALCIFEROL (ERGOCALCIFEROL) 50,000 UNIT CAP    Take 1 capsule (50,000 Units total) by mouth every 30 days.    ESOMEPRAZOLE (NEXIUM) 40 MG CAPSULE    Take 40 mg by mouth once daily.    FLUTICASONE PROPIONATE (FLONASE) 50 MCG/ACTUATION NASAL SPRAY    1 spray (50 mcg total) by Each Nostril route 2 (two) times daily.    HYDROCORTISONE 2.5 % CREAM        KETOCONAZOLE (NIZORAL) 2 % CREAM        LINZESS 72 MCG CAP CAPSULE    Take 72 mcg by mouth once daily.    LORATADINE (CLARITIN) 10 MG TABLET    Take 1 tablet (10 mg total) by mouth once daily.    RABEPRAZOLE (ACIPHEX) 20 MG TABLET    Take 20 mg by mouth once daily.    TRIAMCINOLONE ACETONIDE 0.1% (KENALOG) 0.1 % CREAM             Disclaimer:  This note has been generated  using voice-recognition software. There may be grammatical or spelling errors that have been missed during proof-reading

## 2022-04-14 ENCOUNTER — PATIENT MESSAGE (OUTPATIENT)
Dept: INTERNAL MEDICINE | Facility: CLINIC | Age: 62
End: 2022-04-14
Payer: COMMERCIAL

## 2022-04-14 NOTE — TELEPHONE ENCOUNTER
Lab Results   Component Value Date    IRON 64 04/13/2022    TIBC 419 04/13/2022    FERRITIN 217 04/13/2022     Future Appointments   Date Time Provider Department Center   5/12/2022  7:45 AM [facility]   7/5/2022  9:30 AM Nidia Bradley MD [facility]   10/12/2022  3:00 PM Eddie Woodward III, MD [facility]

## 2022-05-12 ENCOUNTER — HOSPITAL ENCOUNTER (OUTPATIENT)
Dept: RADIOLOGY | Facility: HOSPITAL | Age: 62
Discharge: HOME | End: 2022-05-12
Attending: NURSE PRACTITIONER
Payer: COMMERCIAL

## 2022-05-12 DIAGNOSIS — Z80.3 FAMILY HISTORY OF BREAST CANCER: ICD-10-CM

## 2022-05-12 DIAGNOSIS — R92.30 DENSE BREAST TISSUE ON MAMMOGRAM: ICD-10-CM

## 2022-05-12 DIAGNOSIS — Z91.89 INCREASED RISK OF BREAST CANCER: ICD-10-CM

## 2022-05-12 PROCEDURE — 77063 BREAST TOMOSYNTHESIS BI: CPT | Mod: 26,,, | Performed by: RADIOLOGY

## 2022-05-12 PROCEDURE — 77067 MAMMO DIGITAL SCREENING BILAT WITH TOMO: ICD-10-PCS | Mod: 26,,, | Performed by: RADIOLOGY

## 2022-05-12 PROCEDURE — 77063 MAMMO DIGITAL SCREENING BILAT WITH TOMO: ICD-10-PCS | Mod: 26,,, | Performed by: RADIOLOGY

## 2022-05-12 PROCEDURE — 77067 SCR MAMMO BI INCL CAD: CPT | Mod: TC

## 2022-05-12 PROCEDURE — 77067 SCR MAMMO BI INCL CAD: CPT | Mod: 26,,, | Performed by: RADIOLOGY

## 2022-05-12 PROCEDURE — 77063 BREAST TOMOSYNTHESIS BI: CPT | Mod: TC

## 2022-11-07 ENCOUNTER — HOSPITAL ENCOUNTER (EMERGENCY)
Facility: HOSPITAL | Age: 62
Discharge: HOME | End: 2022-11-07
Attending: EMERGENCY MEDICINE
Payer: COMMERCIAL

## 2022-11-07 VITALS
TEMPERATURE: 99 F | WEIGHT: 195 LBS | OXYGEN SATURATION: 100 % | HEART RATE: 70 BPM | RESPIRATION RATE: 18 BRPM | BODY MASS INDEX: 28.8 KG/M2 | DIASTOLIC BLOOD PRESSURE: 92 MMHG | SYSTOLIC BLOOD PRESSURE: 159 MMHG

## 2022-11-07 DIAGNOSIS — R12 HEARTBURN: ICD-10-CM

## 2022-11-07 DIAGNOSIS — K30 ACID INDIGESTION: Primary | ICD-10-CM

## 2022-11-07 LAB
ALBUMIN SERPL BCP-MCNC: 4.5 G/DL (ref 3.5–5.2)
ALP SERPL-CCNC: 101 U/L (ref 38–126)
ALT SERPL W/O P-5'-P-CCNC: 40 U/L (ref 10–44)
ANION GAP SERPL CALC-SCNC: 9 MMOL/L (ref 8–16)
AST SERPL-CCNC: 32 U/L (ref 15–46)
BASOPHILS # BLD AUTO: 0.02 K/UL (ref 0–0.2)
BASOPHILS NFR BLD: 0.6 % (ref 0–1.9)
BILIRUB SERPL-MCNC: 0.9 MG/DL (ref 0.1–1)
CALCIUM SERPL-MCNC: 9.4 MG/DL (ref 8.7–10.5)
CHLORIDE SERPL-SCNC: 106 MMOL/L (ref 95–110)
CO2 SERPL-SCNC: 27 MMOL/L (ref 23–29)
CREAT SERPL-MCNC: 0.69 MG/DL (ref 0.5–1.4)
DIFFERENTIAL METHOD: ABNORMAL
EOSINOPHIL # BLD AUTO: 0.1 K/UL (ref 0–0.5)
EOSINOPHIL NFR BLD: 2 % (ref 0–8)
ERYTHROCYTE [DISTWIDTH] IN BLOOD BY AUTOMATED COUNT: 14 % (ref 11.5–14.5)
EST. GFR  (NO RACE VARIABLE): >60 ML/MIN/1.73 M^2
GLUCOSE SERPL-MCNC: 90 MG/DL (ref 70–110)
HCT VFR BLD AUTO: 43.1 % (ref 37–48.5)
HGB BLD-MCNC: 13.1 G/DL (ref 12–16)
IMM GRANULOCYTES # BLD AUTO: 0.01 K/UL (ref 0–0.04)
IMM GRANULOCYTES NFR BLD AUTO: 0.3 % (ref 0–0.5)
LIPASE SERPL-CCNC: 31 U/L (ref 23–300)
LYMPHOCYTES # BLD AUTO: 1.8 K/UL (ref 1–4.8)
LYMPHOCYTES NFR BLD: 51.7 % (ref 18–48)
MCH RBC QN AUTO: 23.2 PG (ref 27–31)
MCHC RBC AUTO-ENTMCNC: 30.4 G/DL (ref 32–36)
MCV RBC AUTO: 76 FL (ref 82–98)
MONOCYTES # BLD AUTO: 0.3 K/UL (ref 0.3–1)
MONOCYTES NFR BLD: 9.5 % (ref 4–15)
NEUTROPHILS # BLD AUTO: 1.3 K/UL (ref 1.8–7.7)
NEUTROPHILS NFR BLD: 35.9 % (ref 38–73)
NRBC BLD-RTO: 0 /100 WBC
PLATELET # BLD AUTO: 219 K/UL (ref 150–450)
PMV BLD AUTO: 10.7 FL (ref 9.2–12.9)
POTASSIUM SERPL-SCNC: 4 MMOL/L (ref 3.5–5.1)
PROT SERPL-MCNC: 7.7 G/DL (ref 6–8.4)
RBC # BLD AUTO: 5.65 M/UL (ref 4–5.4)
SODIUM SERPL-SCNC: 142 MMOL/L (ref 136–145)
TROPONIN I SERPL-MCNC: <0.012 NG/ML (ref 0.01–0.03)
UUN UR-MCNC: 10 MG/DL (ref 7–17)
WBC # BLD AUTO: 3.48 K/UL (ref 3.9–12.7)

## 2022-11-07 PROCEDURE — 93010 ELECTROCARDIOGRAM REPORT: CPT | Mod: ,,, | Performed by: INTERNAL MEDICINE

## 2022-11-07 PROCEDURE — 83690 ASSAY OF LIPASE: CPT | Mod: ER | Performed by: EMERGENCY MEDICINE

## 2022-11-07 PROCEDURE — 84484 ASSAY OF TROPONIN QUANT: CPT | Mod: ER | Performed by: EMERGENCY MEDICINE

## 2022-11-07 PROCEDURE — 93005 ELECTROCARDIOGRAM TRACING: CPT | Mod: ER

## 2022-11-07 PROCEDURE — 99285 EMERGENCY DEPT VISIT HI MDM: CPT | Mod: 25,ER

## 2022-11-07 PROCEDURE — 99900035 HC TECH TIME PER 15 MIN (STAT): Mod: ER

## 2022-11-07 PROCEDURE — 25000003 PHARM REV CODE 250: Mod: ER | Performed by: EMERGENCY MEDICINE

## 2022-11-07 PROCEDURE — 85025 COMPLETE CBC W/AUTO DIFF WBC: CPT | Mod: ER | Performed by: EMERGENCY MEDICINE

## 2022-11-07 PROCEDURE — 93010 EKG 12-LEAD: ICD-10-PCS | Mod: ,,, | Performed by: INTERNAL MEDICINE

## 2022-11-07 PROCEDURE — 80053 COMPREHEN METABOLIC PANEL: CPT | Mod: ER | Performed by: EMERGENCY MEDICINE

## 2022-11-07 RX ORDER — LIDOCAINE HYDROCHLORIDE 20 MG/ML
15 SOLUTION OROPHARYNGEAL ONCE
Status: COMPLETED | OUTPATIENT
Start: 2022-11-07 | End: 2022-11-07

## 2022-11-07 RX ORDER — OMEPRAZOLE 20 MG/1
20 CAPSULE, DELAYED RELEASE ORAL DAILY
Qty: 30 CAPSULE | Refills: 11 | Status: SHIPPED | OUTPATIENT
Start: 2022-11-07 | End: 2023-05-19

## 2022-11-07 RX ORDER — DEXLANSOPRAZOLE 30 MG/1
30 CAPSULE, DELAYED RELEASE ORAL DAILY
Qty: 30 CAPSULE | Refills: 2 | Status: SHIPPED | OUTPATIENT
Start: 2022-11-07 | End: 2023-05-19

## 2022-11-07 RX ORDER — MAG HYDROX/ALUMINUM HYD/SIMETH 200-200-20
30 SUSPENSION, ORAL (FINAL DOSE FORM) ORAL ONCE
Status: COMPLETED | OUTPATIENT
Start: 2022-11-07 | End: 2022-11-07

## 2022-11-07 RX ADMIN — ALUMINUM HYDROXIDE, MAGNESIUM HYDROXIDE, AND SIMETHICONE 30 ML: 200; 200; 20 SUSPENSION ORAL at 03:11

## 2022-11-07 RX ADMIN — LIDOCAINE HYDROCHLORIDE 15 ML: 20 SOLUTION ORAL; TOPICAL at 03:11

## 2022-11-07 NOTE — ED PROVIDER NOTES
Chief Complaint: acid indigestion     History of Present Illness:    Leschia T Bastian 62 y.o. with a  has a past medical history of Atypical chest pain, Cervical radiculopathy, COVID-19, GERD (gastroesophageal reflux disease), Hypertension, Kidney cyst, acquired (1/28/2021), Lumbar radiculopathy, JAYLON on CPAP, Seasonal allergies, and Shortness of breath. who presents to the emergency department today with complaint of acid indigestion. Pt reports that he has a burning pain that runs from mouth to epigastric region. Has known acid reflux. Used to take omeprazole but hs been out, has been taking nexium. Her acid indigestion is worsening. She has tried supplementing with pepto bismol without relief. No shortness of breath, palpitations, nausea, diaphoresis. She has felt a little lightheaded. Started yesterday, like she might pass out, did not pass out.       ROS    Constitutional: No fever, no chills.  Eyes: No discharge. No pain.  ENT: No nasal drainage. No ear ache. No sore throat.  Cardiovascular: No palpitations.  Respiratory: No cough, no shortness of breath.  Gastrointestinal: No vomiting. No diarrhea.  Genitourinary: No hematuria, dysuria, urgency.  Musculoskeletal: No back pain.   Skin: No rashes, no lesions.  Neurological: No headache, no focal weakness.  Hematologic: Does not bleed easily.     Otherwise remaining ROS negative     The history is provided by the patient      Reviewed and verified by myself:   PMH/PSH/SOC/FH REVIEWED :  Leschia T Bastian  has a past medical history of Atypical chest pain, Cervical radiculopathy, COVID-19, GERD (gastroesophageal reflux disease), Hypertension, Kidney cyst, acquired (1/28/2021), Lumbar radiculopathy, JAYLON on CPAP, Seasonal allergies, and Shortness of breath. and   has a past surgical history that includes Tubal ligation; Gallbladder surgery; Bunionectomy; Axillary hidradenitis excision (Bilateral); Laparoscopic total hysterectomy (N/A, 6/22/2018); Cystoscopy  (6/22/2018); Lysis of adhesions of ureter (Right, 6/22/2018); Total abdominal hysterectomy w/ bilateral salpingoophorectomy (Bilateral, 6/22/2018); and Hysterectomy. with  reports that she has never smoked. She has never used smokeless tobacco. She reports that she does not use drugs. and a family history includes Breast cancer in her maternal cousin and maternal cousin; Breast cancer (age of onset: 52) in her sister; Breast cancer (age of onset: 53) in her mother; Cancer in her cousin and mother; Colon cancer (age of onset: 46) in her brother; Glaucoma in her father; Heart disease in her maternal grandfather and paternal grandfather; Hypertension in her father, mother, and sister; Ovarian cancer in her cousin.      Nursing/Ancillary staff note reviewed.  ALLERGIES REVIEWED  MEDICATIONS REVIEWED  VS reviewed         Physical Exam     ED Triage Vitals [11/07/22 1536]   BP (!) 159/92   Pulse 70   Resp 18   Temp 98.5 °F (36.9 °C)   SpO2 100 %       Physical Exam    Constitutional: The patient is alert, has no immediate need for airway protection and no signs of toxicity. No acute distress. Lying in bed but able to sit up without difficulty.   Eyes: Pupils equal and round no pallor or injection. Extra ocular movements intact. No drainage.   ENT: Mucous membranes are moist. Oropharynx clear.   Neck: Neck is supple non-tender. No lymphadenopathy. No stridor.   Respiratory: There are no retractions, lungs are clear to auscultation. No wheezing, no crackles.   Cardiovascular: Regular rate and rhythm. No murmurs, rubs or gallops.  Chest/Breast: Chest wall nontender to palpation.   Gastrointestinal:  Abdomen is soft and non-tender, no masses, bowel sounds normal. No guarding, no rebound.  No pulsatile mass.   Neurological: Alert and oriented x 4. CN II-XII grossly intact. No focal weakness. Strength intact 5/5 bilaterally in upper and lower extremities.   Skin: Warm and dry, no rashes.  Musculoskeletal: Extremities are  non-tender, non-swollen and have full range of motion. Back NTTP along the midline.   Psyc: Normal behavior. Linear thought content.     DIFFERENTIAL DIAGNOSIS: After history and physical exam a differential diagnosis was considered, but was not limited to, myocardial ischemia, pericarditis, pulmonary embolus, chest wall pain, pleural inflammation, pulmonary infectious causes, aortic dissection.             Reviewed by myself, read by radiology.     X-Ray Chest AP Portable   Final Result      1.  Negative for acute process involving the chest.      2.  Stable findings as noted above.         Electronically signed by: Alcides Ochoa MD   Date:    11/07/2022   Time:    16:34              I independently ordered and interpreted the EKG without the assistance of cardiology and it showed:  71 bpm, normal axis, no STEMI, read by myself read by cardiology pending.           ED Course     Medications   aluminum-magnesium hydroxide-simethicone 200-200-20 mg/5 mL suspension 30 mL (30 mLs Oral Given 11/7/22 1556)     And   LIDOcaine HCl 2% oral solution 15 mL (15 mLs Oral Given 11/7/22 1555)         ED Course as of 11/07/22 1728   Mon Nov 07, 2022   1710 Troponin I: <0.012 [JA]   1712 Lipase Result: 31 [JA]   1713 BILIRUBIN TOTAL: 0.9 [JA]   1713 AST: 32 [JA]   1713 ALT: 40 [JA]      ED Course User Index  [JA] Karley Carter MD              Medical Decision Making:   History:   I obtained history from:  The patient  Old Medical Records: I decided to obtain old medical records.   Reviewed and summarized the old medical record and it showed: pts PCP is Bridger Woodward III, MD.      :   I have ordered and independently interpreted EKG Reading(s) without the involvement of Cardiology - see note above    Clinical Tests:   Lab Tests: Ordered and Reviewed       <> Summary of Lab: troponin normal, lipase, normal, CMP normal, CBC unremarkable.   Radiological Study: Ordered and Reviewed  Medical Tests: Ordered and Reviewed      ED  Management:  Leschia T Bastian  presents to the emergency Department today with burning pain that is consistent with acid indigestion. Her workup today is reassuring, EKG normal, CXR unremarkable, labs unremarkable. Lipase, t bili, lfts normal, troponin normal, unlikely to be ACS, choledocholithiasis, pancreatitis, malignancy. She has been off her meds for at least a month. I will restart. She will need to follow up with her PCP and if not improving likely will need a scope.    The pt is comfortable with this plan and comfortable going home at this time. After taking into careful account the historical factors and physical exam findings of the patient's presentation today, in conjunction with the empirical and objective data obtained on ED workup, no acute emergent medical condition requiring admission has been identified. The patient appears to be low risk for an emergent medical condition and I feel it is safe and appropriate at this time for the patient to be discharged to follow-up as detailed in their discharge instructions for reevaluation and possible continued outpatient workup and management. Regardless, an unremarkable evaluation in the ED does not preclude the development or presence of a serious or life threatening condition. As such, patient was instructed to return immediately for any worsening or change in current symptoms. Precautions for return discussed at length.  Discharge and follow-up instructions discussed with the patient who expressed understanding and willingness to comply with my recommendations.    Voice recognition software utilized in this note.              Impression      The primary encounter diagnosis was Acid indigestion. A diagnosis of Heartburn was also pertinent to this visit.                New Prescriptions    DEXLANSOPRAZOLE (DEXILANT) 30 MG CPDM    Take 1 capsule (30 mg total) by mouth once daily.    OMEPRAZOLE (PRILOSEC) 20 MG CAPSULE    Take 1 capsule (20 mg total) by  mouth once daily.        Follow-up Information       Schedule an appointment as soon as possible for a visit  with Bridger Woodward III, MD.    Specialty: Internal Medicine  Contact information:  [address] [facility]  [place] [address] [phone]               Schedule an appointment as soon as possible for a visit  with [facility].    Specialty: Gastroenterology  Contact information:  [address], [address] [address] [phone]  Additional information:  Please park in surface lot and check in at [address]                                        Karley Carter MD  11/07/22 5400

## 2022-11-07 NOTE — DISCHARGE INSTRUCTIONS
Additional instructions  Followup with your primary care physician in 2-3 days if you are not improving. Take all your medications as prescribed. If not improving you may need to follow up with GI, see follow up section.  Return to the emergency department if you have increasing pain, chest pain, difficulty breathing,  nonstop vomiting, or any other concerns. Be sure to drink plenty of fluids to stay hydrated. Get plenty of rest. Please refer to additional educational material for further instructions.    If your blood pressure was over > 120/80 without history of hypertension you are advised to follow up with your PCP.  While elevated Blood pressures can be caused by many things including just coming to the emergency department, you will need to follow up with your primary care physician for further evaluation ideally in 2-3 days.

## 2023-01-12 ENCOUNTER — IMMUNIZATION (OUTPATIENT)
Dept: PHARMACY | Facility: CLINIC | Age: 63
End: 2023-01-12
Payer: COMMERCIAL

## 2023-01-12 ENCOUNTER — TELEPHONE (OUTPATIENT)
Dept: HEMATOLOGY/ONCOLOGY | Facility: CLINIC | Age: 63
End: 2023-01-12
Payer: COMMERCIAL

## 2023-01-12 ENCOUNTER — TELEPHONE (OUTPATIENT)
Dept: PRIMARY CARE CLINIC | Facility: CLINIC | Age: 63
End: 2023-01-12
Payer: COMMERCIAL

## 2023-01-12 ENCOUNTER — IMMUNIZATION (OUTPATIENT)
Dept: INTERNAL MEDICINE | Facility: CLINIC | Age: 63
End: 2023-01-12
Payer: COMMERCIAL

## 2023-01-12 ENCOUNTER — OFFICE VISIT (OUTPATIENT)
Dept: HEMATOLOGY/ONCOLOGY | Facility: CLINIC | Age: 63
End: 2023-01-12
Payer: COMMERCIAL

## 2023-01-12 VITALS
TEMPERATURE: 97 F | RESPIRATION RATE: 17 BRPM | DIASTOLIC BLOOD PRESSURE: 79 MMHG | SYSTOLIC BLOOD PRESSURE: 141 MMHG | WEIGHT: 193.81 LBS | HEART RATE: 87 BPM | HEIGHT: 69 IN | OXYGEN SATURATION: 98 % | BODY MASS INDEX: 28.71 KG/M2

## 2023-01-12 DIAGNOSIS — I10 ESSENTIAL HYPERTENSION: Chronic | ICD-10-CM

## 2023-01-12 DIAGNOSIS — Z91.89 INCREASED RISK OF BREAST CANCER: Primary | ICD-10-CM

## 2023-01-12 DIAGNOSIS — Z23 NEED FOR VACCINATION: Primary | ICD-10-CM

## 2023-01-12 DIAGNOSIS — Z12.31 ENCOUNTER FOR SCREENING MAMMOGRAM FOR BREAST CANCER: ICD-10-CM

## 2023-01-12 DIAGNOSIS — E66.3 OVERWEIGHT (BMI 25.0-29.9): ICD-10-CM

## 2023-01-12 DIAGNOSIS — R92.30 DENSE BREAST TISSUE: ICD-10-CM

## 2023-01-12 DIAGNOSIS — R45.89 ANXIETY ABOUT HEALTH: ICD-10-CM

## 2023-01-12 DIAGNOSIS — Z80.3 FAMILY HISTORY OF BREAST CANCER: ICD-10-CM

## 2023-01-12 DIAGNOSIS — N64.4 BREAST PAIN: ICD-10-CM

## 2023-01-12 PROCEDURE — 3078F PR MOST RECENT DIASTOLIC BLOOD PRESSURE < 80 MM HG: ICD-10-PCS | Mod: CPTII,S$GLB,, | Performed by: NURSE PRACTITIONER

## 2023-01-12 PROCEDURE — 90471 FLU VACCINE (QUAD) GREATER THAN OR EQUAL TO 3YO PRESERVATIVE FREE IM: ICD-10-PCS | Mod: S$GLB,,, | Performed by: INTERNAL MEDICINE

## 2023-01-12 PROCEDURE — 1159F MED LIST DOCD IN RCRD: CPT | Mod: CPTII,S$GLB,, | Performed by: NURSE PRACTITIONER

## 2023-01-12 PROCEDURE — 3077F SYST BP >= 140 MM HG: CPT | Mod: CPTII,S$GLB,, | Performed by: NURSE PRACTITIONER

## 2023-01-12 PROCEDURE — 3008F PR BODY MASS INDEX (BMI) DOCUMENTED: ICD-10-PCS | Mod: CPTII,S$GLB,, | Performed by: NURSE PRACTITIONER

## 2023-01-12 PROCEDURE — 1159F PR MEDICATION LIST DOCUMENTED IN MEDICAL RECORD: ICD-10-PCS | Mod: CPTII,S$GLB,, | Performed by: NURSE PRACTITIONER

## 2023-01-12 PROCEDURE — 3078F DIAST BP <80 MM HG: CPT | Mod: CPTII,S$GLB,, | Performed by: NURSE PRACTITIONER

## 2023-01-12 PROCEDURE — 90471 IMMUNIZATION ADMIN: CPT | Mod: S$GLB,,, | Performed by: INTERNAL MEDICINE

## 2023-01-12 PROCEDURE — 90686 FLU VACCINE (QUAD) GREATER THAN OR EQUAL TO 3YO PRESERVATIVE FREE IM: ICD-10-PCS | Mod: S$GLB,,, | Performed by: INTERNAL MEDICINE

## 2023-01-12 PROCEDURE — 99215 OFFICE O/P EST HI 40 MIN: CPT | Mod: S$GLB,,, | Performed by: NURSE PRACTITIONER

## 2023-01-12 PROCEDURE — 3008F BODY MASS INDEX DOCD: CPT | Mod: CPTII,S$GLB,, | Performed by: NURSE PRACTITIONER

## 2023-01-12 PROCEDURE — 99999 PR PBB SHADOW E&M-EST. PATIENT-LVL V: CPT | Mod: PBBFAC,,, | Performed by: NURSE PRACTITIONER

## 2023-01-12 PROCEDURE — 99215 PR OFFICE/OUTPT VISIT, EST, LEVL V, 40-54 MIN: ICD-10-PCS | Mod: S$GLB,,, | Performed by: NURSE PRACTITIONER

## 2023-01-12 PROCEDURE — 3077F PR MOST RECENT SYSTOLIC BLOOD PRESSURE >= 140 MM HG: ICD-10-PCS | Mod: CPTII,S$GLB,, | Performed by: NURSE PRACTITIONER

## 2023-01-12 PROCEDURE — 99999 PR PBB SHADOW E&M-EST. PATIENT-LVL V: ICD-10-PCS | Mod: PBBFAC,,, | Performed by: NURSE PRACTITIONER

## 2023-01-12 PROCEDURE — 90686 IIV4 VACC NO PRSV 0.5 ML IM: CPT | Mod: S$GLB,,, | Performed by: INTERNAL MEDICINE

## 2023-01-12 RX ORDER — DIAZEPAM 5 MG/1
TABLET ORAL
Qty: 2 TABLET | Refills: 0 | Status: SHIPPED | OUTPATIENT
Start: 2023-01-12 | End: 2023-05-19

## 2023-01-12 RX ORDER — ONDANSETRON 4 MG/1
4 TABLET, ORALLY DISINTEGRATING ORAL EVERY 8 HOURS PRN
Qty: 5 TABLET | Refills: 0 | Status: SHIPPED | OUTPATIENT
Start: 2023-01-12 | End: 2023-05-19

## 2023-01-12 NOTE — TELEPHONE ENCOUNTER
----- Message from Amber Rice sent at 1/12/2023 10:53 AM CST -----  Regarding: Appt  Contact: Pt [phone]  Patient called stating she will be 20 minute late due to traffic and the weather Please call if rescheduling is needed Marilia

## 2023-01-12 NOTE — TELEPHONE ENCOUNTER
"----- Message from Magdalena Boyce sent at 1/12/2023  1:08 PM CST -----  Regarding: Scheduling Request  Patient Status: active    Scheduling Appt : NP    Time/Date Preference: anyday after 2pm    MyChart Active User?: yes    Relationship to Patient?: self    Contact Preference?: [phone]    Treating Provider: any    Do you feel you need to be seen today? no         Additional Notes:  "Thank you for all that you do for our patients'     "

## 2023-01-12 NOTE — PROGRESS NOTES
Chief Complaint   Patient presents with    Increased risk of breast cancer         HPI:   Leschia T Bastian presents for follow up for high risk of breast cancer.       Today, Feels good and no complaints.   No breast concerns except slight tenderness in different areas  Difficulty losing weight. Started exercising.   Works in [employer] and reports long drives causes fatigue.  Overdue for MRI breast - patient thought she was getting today  Needs new PCP    2022 MMG:   Impression:   No mammographic evidence of malignancy.     BI-RADS Category 1: Negative     Recommendation:  Routine screening mammogram in 1 year is recommended.     Your estimated lifetime risk of breast cancer (to age 85) based on Tyrer-Cuzick risk assessment model is 15.88 %.  According to the American Cancer Society, patients with a lifetime breast cancer risk of 20% or higher might benefit from supplemental screening tests. ??            High Risk Breast cancer specific history:  - Age: 62 y.o.   - Height:  5'9  - Weight: 195 lbs >193lbs  - Breast density per BI-RADS:  b - Scattered fibroglandular density  - Age at menarche:  12 yo  - Number of pregnancies: ; age of first live birth: 23 yo  - History of breast feeding: No.   - Age at menopause, if applicable:  58 yo  -Uterus and ovaries intact: No: ovaries and uterus removed 2018  - HRT: No  - Genetic testing: No  - Personal history of cancer: No  - Previous chest radiation exposure between ages 10-30 years old: No  - Personal history of breast biopsy: No  - Ashkenazi Judaism Inheritance: No  - Family history of cancer:    Mother: Breast cancer, age 53. current 83. Genetic testing negative  Sister(s): Breast cancer, age 54. Current 56. Genetic testing negative.  Maternal aunt: Breast cancer  Maternal 2nd cousin: ovarian cancer        Social History:  Tobacco use:  no  Alcohol use:  social  Exercise regimen: no  Employment: works in [employer]         Past Medical   Past Medical History:    Diagnosis Date    Atypical chest pain     Cervical radiculopathy     COVID-19     GERD (gastroesophageal reflux disease)     Hypertension     Kidney cyst, acquired 1/28/2021    Lumbar radiculopathy     JAYLON on CPAP     Seasonal allergies     Shortness of breath      Patient Active Problem List   Diagnosis    Essential hypertension    GERD (gastroesophageal reflux disease)    Obstructive sleep apnea treated with continuous positive airway pressure (CPAP)    Mild vitamin D deficiency    Shortness of breath    WELLS (dyspnea on exertion)    S/P ROLDAN-BSO    Abnormal increased muscle tightness    Biceps tendonitis on right    History of COVID-19    Increased risk of breast cancer    Right-sided chest pain    Overweight (BMI 25.0-29.9)    Elevated liver enzymes    Fatty liver    Kidney cyst, acquired    JAYLON (obstructive sleep apnea)     Social History   Social History     Tobacco Use    Smoking status: Never    Smokeless tobacco: Never   Substance Use Topics    Drug use: No     Family History  Family History   Problem Relation Age of Onset    Breast cancer Mother 53    Cancer Mother         breast cancer    Hypertension Mother     Ovarian cancer Cousin     Cancer Cousin         ovarian    Hypertension Sister     Breast cancer Sister 52    Glaucoma Father     Hypertension Father     Breast cancer Maternal Cousin     Breast cancer Maternal Cousin     Heart disease Maternal Grandfather     Heart disease Paternal Grandfather     Colon cancer Brother 46     Medications    Current Outpatient Medications:     amLODIPine (NORVASC) 5 MG tablet, TAKE 1 TABLET BY MOUTH EVERY DAY. STOP LOSARTAN, Disp: 90 tablet, Rfl: 3    dicyclomine (BENTYL) 10 MG capsule, Take 10 mg by mouth 2 (two) times daily., Disp: , Rfl:     ergocalciferol (ERGOCALCIFEROL) 50,000 unit Cap, Take 1 capsule (50,000 Units total) by mouth every 30 days., Disp: 3 capsule, Rfl: 4    esomeprazole (NEXIUM) 40 MG capsule, Take 40 mg by mouth once daily., Disp: , Rfl:      "LINZESS 72 mcg Cap capsule, Take 72 mcg by mouth once daily., Disp: , Rfl:     omeprazole (PRILOSEC) 20 MG capsule, Take 1 capsule (20 mg total) by mouth once daily., Disp: 30 capsule, Rfl: 11    dexlansoprazole (DEXILANT) 30 mg CpDM, Take 1 capsule (30 mg total) by mouth once daily. (Patient not taking: Reported on 1/12/2023), Disp: 30 capsule, Rfl: 2    diazePAM (VALIUM) 5 MG tablet, Take 1 tablet by mouth 30 minutes prior to MRI. You can take a 2nd dose 15 minutes prior if still anxious., Disp: 2 tablet, Rfl: 0    fluticasone propionate (FLONASE) 50 mcg/actuation nasal spray, 1 spray (50 mcg total) by Each Nostril route 2 (two) times daily. (Patient not taking: Reported on 1/12/2023), Disp: 18.2 mL, Rfl: 0    hydrocortisone 2.5 % cream, , Disp: , Rfl:     ketoconazole (NIZORAL) 2 % cream, , Disp: , Rfl:     loratadine (CLARITIN) 10 mg tablet, Take 1 tablet (10 mg total) by mouth once daily. (Patient not taking: Reported on 1/12/2023), Disp: 30 tablet, Rfl: 1    ondansetron (ZOFRAN-ODT) 4 MG TbDL, Take 1 tablet (4 mg total) by mouth every 8 (eight) hours as needed (nausea)., Disp: 5 tablet, Rfl: 0    sars-cov-2, covid-19 omicron, (MODERNA COVID BIVAL,6Y UP,,PF,) 50 mcg/0.5 mL injection, Inject 0.5 mLs into the muscle once. for 1 dose, Disp: 0.5 mL, Rfl: 0    triamcinolone acetonide 0.1% (KENALOG) 0.1 % cream, , Disp: , Rfl:   Allergies  Review of patient's allergies indicates:   Allergen Reactions    Clams      Allergy testing confirmed    Codeine Itching    Scallops     Shrimp      Peeling shrimp, got a rash, allergy test confirmed       Review of Systems       See above   All other systems reviewed and are negative.    Objective:      Vitals:   Vitals:    01/12/23 1130   BP: (!) 141/79   Pulse: 87   Resp: 17   Temp: 97.1 °F (36.2 °C)   TempSrc: Oral   SpO2: 98%   Weight: 87.9 kg (193 lb 12.6 oz)   Height: 5' 9" (1.753 m)     BMI: Body mass index is 28.62 kg/m².   Body surface area is 2.07 meters " squared.    Physical Exam  Vitals signs reviewed.   Constitutional:  Normal appearance. NAD.   HENT: Normocephalic.   Eyes: Pupils are equal, round, and reactive to light.   Cardiovascular: Normal rate. RRR  Pulmonary: Pulmonary effort is normal. CTA  Musculoskeletal: Normal range of motion.   Lymphadenopathy: No cervical adenopathy. No axillary adenopathy.   Skin: Skin is warm and dry.   Neurological:  She is alert and oriented to person, place, and time.   Psychiatric: Mood normal.     Breast Exam:  tenderness throughout ; no masses but with large breast; no LAD      Laboratory Data: reviewed most recent   Imaging: reviewed most recent        Assessment:     1. Increased risk of breast cancer    2. Family history of breast cancer    3. Dense breast tissue    4. Breast pain    5. Essential hypertension    6. Overweight (BMI 25.0-29.9)    7. Encounter for screening mammogram for breast cancer    8. Anxiety about health          Scores recalculated - TC 17.2%; Ruby model 3.9%  Clinically negative    Plan:     Opts to continue annual MRI/MMG for now. Will reassess next year as risk may drop. ?annual CT MMG appropriate. MRI due now; MMG due 5/2023  Encouraged breast awareness and call for any changes/abnormalities  Lifestyle modifications as discussed previously.   Rx valium for MRI; zofran Rx as well (reported nausea with last MRI)   Refer to Aimee for weight loss   Refer to internal medicine   Monitor BP  [facility]     RTC 1 year    Route Chart for Scheduling    Med Onc Chart Routing      Follow up with physician    Follow up with WE[facility]ON 1 year. see me 1 year   Infusion scheduling note    Injection scheduling note    Labs    Imaging   MMG due 5/2023   Pharmacy appointment    Other referrals           Questions were encouraged and answered to patient's satisfaction, and patient verbalized understanding of information and agreement with the plan. Advised patient to RTC with any interval changes or concerns.        Patient is  in agreement with the proposed treatment plan. All questions were answered to the patient's satisfaction. Pt knows to call clinic for any new or worsening symptoms and if anything is needed before the next clinic visit.      MARCUS Lin-C  Hematology & Oncology  [facility] [place] [address]  ph. [phone]  Fax. [phone]       40 minutes of total time spent on the encounter, which includes face to face time and non-face to face time preparing to see the patient (eg, review of tests), Obtaining and/or reviewing separately obtained history, Documenting clinical information in the electronic or other health record, Independently interpreting results (not separately reported) and communicating results to the patient/family/caregiver, or Care coordination (not separately reported).

## 2023-03-06 DIAGNOSIS — Z13.820 ENCOUNTER FOR SCREENING FOR OSTEOPOROSIS: Primary | ICD-10-CM

## 2023-03-15 ENCOUNTER — HOSPITAL ENCOUNTER (OUTPATIENT)
Dept: RADIOLOGY | Facility: HOSPITAL | Age: 63
Discharge: HOME | End: 2023-03-15
Attending: NURSE PRACTITIONER
Payer: COMMERCIAL

## 2023-03-15 DIAGNOSIS — Z13.820 ENCOUNTER FOR SCREENING FOR OSTEOPOROSIS: ICD-10-CM

## 2023-03-15 PROCEDURE — 77080 DXA BONE DENSITY AXIAL: CPT | Mod: 26,,, | Performed by: STUDENT IN AN ORGANIZED HEALTH CARE EDUCATION/TRAINING PROGRAM

## 2023-03-15 PROCEDURE — 77080 DXA BONE DENSITY AXIAL SKELETON 1 OR MORE SITES: ICD-10-PCS | Mod: 26,,, | Performed by: STUDENT IN AN ORGANIZED HEALTH CARE EDUCATION/TRAINING PROGRAM

## 2023-03-15 PROCEDURE — 77080 DXA BONE DENSITY AXIAL: CPT | Mod: TC,PO

## 2023-05-05 ENCOUNTER — PATIENT OUTREACH (OUTPATIENT)
Dept: ADMINISTRATIVE | Facility: HOSPITAL | Age: 63
End: 2023-05-05
Payer: COMMERCIAL

## 2023-05-05 NOTE — PROGRESS NOTES
Care Everywhere updates requested and reviewed.  Immunizations reconciled. Media reports reviewed.  Duplicate HM overrides and  orders removed.  Overdue HM topic chart audit and/or requested.  Overdue lab testing linked to upcoming lab appointments if applies.      Health Maintenance Due   Topic Date Due    Pneumococcal Vaccines (Age 0-64) (1 - PCV) Never done    HIV Screening  Never done    Mammogram  2023

## 2023-05-13 ENCOUNTER — HOSPITAL ENCOUNTER (OUTPATIENT)
Dept: RADIOLOGY | Facility: HOSPITAL | Age: 63
Discharge: HOME | End: 2023-05-13
Attending: NURSE PRACTITIONER
Payer: COMMERCIAL

## 2023-05-13 VITALS — HEIGHT: 69 IN | BODY MASS INDEX: 28.58 KG/M2 | WEIGHT: 193 LBS

## 2023-05-13 DIAGNOSIS — Z12.31 ENCOUNTER FOR SCREENING MAMMOGRAM FOR BREAST CANCER: ICD-10-CM

## 2023-05-13 PROCEDURE — 77063 MAMMO DIGITAL SCREENING BILAT WITH TOMO: ICD-10-PCS | Mod: 26,,, | Performed by: RADIOLOGY

## 2023-05-13 PROCEDURE — 77067 SCR MAMMO BI INCL CAD: CPT | Mod: TC

## 2023-05-13 PROCEDURE — 77063 BREAST TOMOSYNTHESIS BI: CPT | Mod: 26,,, | Performed by: RADIOLOGY

## 2023-05-13 PROCEDURE — 77067 SCR MAMMO BI INCL CAD: CPT | Mod: 26,,, | Performed by: RADIOLOGY

## 2023-05-13 PROCEDURE — 77067 MAMMO DIGITAL SCREENING BILAT WITH TOMO: ICD-10-PCS | Mod: 26,,, | Performed by: RADIOLOGY

## 2023-05-19 ENCOUNTER — OFFICE VISIT (OUTPATIENT)
Dept: FAMILY MEDICINE | Facility: CLINIC | Age: 63
End: 2023-05-19
Payer: COMMERCIAL

## 2023-05-19 ENCOUNTER — TELEPHONE (OUTPATIENT)
Dept: FAMILY MEDICINE | Facility: CLINIC | Age: 63
End: 2023-05-19
Payer: COMMERCIAL

## 2023-05-19 VITALS
OXYGEN SATURATION: 100 % | DIASTOLIC BLOOD PRESSURE: 78 MMHG | WEIGHT: 189.69 LBS | SYSTOLIC BLOOD PRESSURE: 118 MMHG | HEIGHT: 69 IN | BODY MASS INDEX: 28.09 KG/M2 | TEMPERATURE: 98 F | HEART RATE: 78 BPM

## 2023-05-19 DIAGNOSIS — G47.33 OSA (OBSTRUCTIVE SLEEP APNEA): ICD-10-CM

## 2023-05-19 DIAGNOSIS — Z00.00 WELLNESS EXAMINATION: ICD-10-CM

## 2023-05-19 DIAGNOSIS — M62.838 SPASM OF RIGHT SIDE ABDOMINAL MUSCLES: ICD-10-CM

## 2023-05-19 DIAGNOSIS — I10 ESSENTIAL HYPERTENSION: Chronic | ICD-10-CM

## 2023-05-19 DIAGNOSIS — F51.01 PRIMARY INSOMNIA: ICD-10-CM

## 2023-05-19 DIAGNOSIS — E55.9 VITAMIN D DEFICIENCY: ICD-10-CM

## 2023-05-19 DIAGNOSIS — Z76.89 ENCOUNTER TO ESTABLISH CARE WITH NEW DOCTOR: Primary | ICD-10-CM

## 2023-05-19 DIAGNOSIS — Z91.89 INCREASED RISK OF BREAST CANCER: ICD-10-CM

## 2023-05-19 DIAGNOSIS — K21.9 GASTROESOPHAGEAL REFLUX DISEASE WITHOUT ESOPHAGITIS: ICD-10-CM

## 2023-05-19 DIAGNOSIS — Z80.0 FAMILY HISTORY OF COLON CANCER: ICD-10-CM

## 2023-05-19 DIAGNOSIS — K76.0 FATTY LIVER: ICD-10-CM

## 2023-05-19 DIAGNOSIS — L65.9 HAIR LOSS DISORDER: ICD-10-CM

## 2023-05-19 PROBLEM — R07.9 RIGHT-SIDED CHEST PAIN: Status: RESOLVED | Noted: 2021-01-04 | Resolved: 2023-05-19

## 2023-05-19 PROBLEM — Z86.16 HISTORY OF COVID-19: Status: RESOLVED | Noted: 2021-01-04 | Resolved: 2023-05-19

## 2023-05-19 PROBLEM — M75.21 BICEPS TENDONITIS ON RIGHT: Status: RESOLVED | Noted: 2019-04-25 | Resolved: 2023-05-19

## 2023-05-19 PROBLEM — R74.8 ELEVATED LIVER ENZYMES: Status: RESOLVED | Noted: 2021-01-05 | Resolved: 2023-05-19

## 2023-05-19 PROBLEM — M62.89 ABNORMAL INCREASED MUSCLE TIGHTNESS: Status: RESOLVED | Noted: 2019-04-25 | Resolved: 2023-05-19

## 2023-05-19 PROBLEM — R06.09 DOE (DYSPNEA ON EXERTION): Status: RESOLVED | Noted: 2018-06-06 | Resolved: 2023-05-19

## 2023-05-19 PROBLEM — Z90.710 S/P TAH (TOTAL ABDOMINAL HYSTERECTOMY): Status: RESOLVED | Noted: 2018-06-25 | Resolved: 2023-05-19

## 2023-05-19 PROCEDURE — 3008F PR BODY MASS INDEX (BMI) DOCUMENTED: ICD-10-PCS | Mod: CPTII,S$GLB,, | Performed by: STUDENT IN AN ORGANIZED HEALTH CARE EDUCATION/TRAINING PROGRAM

## 2023-05-19 PROCEDURE — 1160F RVW MEDS BY RX/DR IN RCRD: CPT | Mod: CPTII,S$GLB,, | Performed by: STUDENT IN AN ORGANIZED HEALTH CARE EDUCATION/TRAINING PROGRAM

## 2023-05-19 PROCEDURE — 1159F PR MEDICATION LIST DOCUMENTED IN MEDICAL RECORD: ICD-10-PCS | Mod: CPTII,S$GLB,, | Performed by: STUDENT IN AN ORGANIZED HEALTH CARE EDUCATION/TRAINING PROGRAM

## 2023-05-19 PROCEDURE — 99999 PR PBB SHADOW E&M-EST. PATIENT-LVL III: ICD-10-PCS | Mod: PBBFAC,,, | Performed by: STUDENT IN AN ORGANIZED HEALTH CARE EDUCATION/TRAINING PROGRAM

## 2023-05-19 PROCEDURE — 4010F PR ACE/ARB THEARPY RXD/TAKEN: ICD-10-PCS | Mod: CPTII,S$GLB,, | Performed by: STUDENT IN AN ORGANIZED HEALTH CARE EDUCATION/TRAINING PROGRAM

## 2023-05-19 PROCEDURE — 3078F DIAST BP <80 MM HG: CPT | Mod: CPTII,S$GLB,, | Performed by: STUDENT IN AN ORGANIZED HEALTH CARE EDUCATION/TRAINING PROGRAM

## 2023-05-19 PROCEDURE — 4010F ACE/ARB THERAPY RXD/TAKEN: CPT | Mod: CPTII,S$GLB,, | Performed by: STUDENT IN AN ORGANIZED HEALTH CARE EDUCATION/TRAINING PROGRAM

## 2023-05-19 PROCEDURE — 1159F MED LIST DOCD IN RCRD: CPT | Mod: CPTII,S$GLB,, | Performed by: STUDENT IN AN ORGANIZED HEALTH CARE EDUCATION/TRAINING PROGRAM

## 2023-05-19 PROCEDURE — 99396 PREV VISIT EST AGE 40-64: CPT | Mod: S$GLB,,, | Performed by: STUDENT IN AN ORGANIZED HEALTH CARE EDUCATION/TRAINING PROGRAM

## 2023-05-19 PROCEDURE — 99396 PR PREVENTIVE VISIT,EST,40-64: ICD-10-PCS | Mod: S$GLB,,, | Performed by: STUDENT IN AN ORGANIZED HEALTH CARE EDUCATION/TRAINING PROGRAM

## 2023-05-19 PROCEDURE — 99999 PR PBB SHADOW E&M-EST. PATIENT-LVL III: CPT | Mod: PBBFAC,,, | Performed by: STUDENT IN AN ORGANIZED HEALTH CARE EDUCATION/TRAINING PROGRAM

## 2023-05-19 PROCEDURE — 3074F SYST BP LT 130 MM HG: CPT | Mod: CPTII,S$GLB,, | Performed by: STUDENT IN AN ORGANIZED HEALTH CARE EDUCATION/TRAINING PROGRAM

## 2023-05-19 PROCEDURE — 3078F PR MOST RECENT DIASTOLIC BLOOD PRESSURE < 80 MM HG: ICD-10-PCS | Mod: CPTII,S$GLB,, | Performed by: STUDENT IN AN ORGANIZED HEALTH CARE EDUCATION/TRAINING PROGRAM

## 2023-05-19 PROCEDURE — 3074F PR MOST RECENT SYSTOLIC BLOOD PRESSURE < 130 MM HG: ICD-10-PCS | Mod: CPTII,S$GLB,, | Performed by: STUDENT IN AN ORGANIZED HEALTH CARE EDUCATION/TRAINING PROGRAM

## 2023-05-19 PROCEDURE — 3008F BODY MASS INDEX DOCD: CPT | Mod: CPTII,S$GLB,, | Performed by: STUDENT IN AN ORGANIZED HEALTH CARE EDUCATION/TRAINING PROGRAM

## 2023-05-19 PROCEDURE — 99214 PR OFFICE/OUTPT VISIT, EST, LEVL IV, 30-39 MIN: ICD-10-PCS | Mod: 25,S$GLB,, | Performed by: STUDENT IN AN ORGANIZED HEALTH CARE EDUCATION/TRAINING PROGRAM

## 2023-05-19 PROCEDURE — 1160F PR REVIEW ALL MEDS BY PRESCRIBER/CLIN PHARMACIST DOCUMENTED: ICD-10-PCS | Mod: CPTII,S$GLB,, | Performed by: STUDENT IN AN ORGANIZED HEALTH CARE EDUCATION/TRAINING PROGRAM

## 2023-05-19 PROCEDURE — 99214 OFFICE O/P EST MOD 30 MIN: CPT | Mod: 25,S$GLB,, | Performed by: STUDENT IN AN ORGANIZED HEALTH CARE EDUCATION/TRAINING PROGRAM

## 2023-05-19 RX ORDER — TIZANIDINE 4 MG/1
4 TABLET ORAL NIGHTLY PRN
Qty: 40 TABLET | Refills: 1 | Status: SHIPPED | OUTPATIENT
Start: 2023-05-19

## 2023-05-19 RX ORDER — EPINEPHRINE 0.3 MG/.3ML
INJECTION SUBCUTANEOUS
COMMUNITY
End: 2023-11-03

## 2023-05-19 RX ORDER — CEFDINIR 300 MG/1
300 CAPSULE ORAL 2 TIMES DAILY
COMMUNITY
Start: 2023-05-15 | End: 2023-08-21

## 2023-05-19 RX ORDER — OMEPRAZOLE 40 MG/1
40 CAPSULE, DELAYED RELEASE ORAL DAILY
Qty: 90 CAPSULE | Refills: 1
Start: 2023-05-19 | End: 2024-05-18

## 2023-05-19 RX ORDER — LOSARTAN POTASSIUM 50 MG/1
50 TABLET ORAL DAILY
Qty: 90 TABLET | Refills: 3 | Status: SHIPPED | OUTPATIENT
Start: 2023-05-19 | End: 2023-08-21

## 2023-05-19 RX ORDER — CLOBETASOL PROPIONATE 0.05 G/100ML
1 SHAMPOO TOPICAL WEEKLY
COMMUNITY
Start: 2023-04-24

## 2023-05-19 RX ORDER — ERGOCALCIFEROL 1.25 MG/1
50000 CAPSULE ORAL
Qty: 12 CAPSULE | Refills: 3 | Status: SHIPPED | OUTPATIENT
Start: 2023-05-19

## 2023-05-19 RX ORDER — TRAZODONE HYDROCHLORIDE 50 MG/1
TABLET ORAL
Qty: 90 TABLET | Refills: 1 | Status: SHIPPED | OUTPATIENT
Start: 2023-05-19

## 2023-05-19 NOTE — TELEPHONE ENCOUNTER
This pt was seen in office today and dr jimenez would like to get her coloscopy results. She stated that it was done at [facility]. Would you be bale to help get those results? Thank you.

## 2023-05-19 NOTE — PROGRESS NOTES
Subjective:       Patient ID: Leschia T Bastian is a 62 y.o. female.    Chief Complaint: Establish Care (Pt here to est care/ would like to discuss lab work), Abdominal Pain (Mid abdomen and radiates to the right side/ feels like stomach or intestines is turning/ forms a lump when occurring/ very painful when happens/ has some issues with bowel movements), and Blurred Vision (Pt states that she has blurry vision within the last month/ has some itching on on the right eye lid)      Active Problem List with Overview Notes    Diagnosis Date Noted    Primary insomnia 05/19/2023     She is waking in the night and has trouble falling back asleep   Possibly related to untreated JAYLON but also may need something to help sleep  She is interested in trying trazadone         Spasm of right side abdominal muscles 05/19/2023     Comes and goes  Positional   S/p ROLDAN (open) and gallbladder out  likely scar tissue related possibly nerve irritation   m relaxer has helped in past          Hair loss disorder 05/19/2023     Follows with hair growth specialist OSH        Family history of colon cancer 05/19/2023     Brother  Q2-3 year colonoscopies   [facility]   Last 2021        JAYLON (obstructive sleep apnea) 02/18/2022     Not usine CPAP 2/2 issues with home renovation  She has in storage  She plans to get this weekend  + fatigue, headaches, run down, not self, more anxiety, bloating and pressure in stomach         Fatty liver 01/28/2021     Labs stable         Kidney cyst, acquired 01/28/2021    Overweight (BMI 25.0-29.9) 01/05/2021    Increased risk of breast cancer 01/04/2021     + fam hx; mother, sister  Follows with GYN   Rotates MRI and mammo every other year though this year cannot afford MRI so is repeating mammo instead         Vitamin D deficiency 08/01/2017     Taking only OTC  Will start 27972 IU weekly         GERD (gastroesophageal reflux disease) 09/27/2012     PPI daily   Follows with GI [facility]         Essential  hypertension      Well controlled  Amlodipine 5  She has been complaining of hair loss; excessive and is seeing hair regrowth specialist; concern that amlodipine may cause hair loss in some people; she is open to changing back to previous medication losartan             Review of Systems   All other systems reviewed and are negative.     A1C:  Recent Labs   Lab 01/15/21  0829   Hemoglobin A1C 5.3     CBC:  Recent Labs   Lab 07/19/21  0803 04/13/22  1600 11/07/22  1603   WBC 7.86 4.66 3.48 L   RBC 5.25 5.50 H 5.65 H   Hemoglobin 12.0 12.3 13.1   Hematocrit 40.6 42.6 43.1   Platelets 214 257 219   MCV 77 L 78 L 76 L   MCH 22.9 L 22.4 L 23.2 L   MCHC 29.6 L 28.9 L 30.4 L     CMP:  Recent Labs   Lab 07/19/21  0803 04/08/22  0755 11/07/22  1603   Glucose 103 96 90   Calcium 9.8 9.2 9.4   Albumin 4.7 4.6 4.5   Total Protein 8.0 8.0 7.7   Sodium 140 142 142   Potassium 4.0 4.5 4.0   CO2 28 28 27   Chloride 104 103 106   BUN 9 13 10   Creatinine 0.67 0.77 0.69   Alkaline Phosphatase 99 93 101   ALT 45 H 61 H 40   AST 50 H 43 32   Total Bilirubin 1.3 H 0.7 0.9     LIPIDS:  Recent Labs   Lab 06/01/20  0926 01/04/21  1026   TSH 1.111  --    HDL  --  60   Cholesterol  --  179   Triglycerides  --  68   LDL Cholesterol  --  105.4   HDL/Cholesterol Ratio  --  33.5   Non-HDL Cholesterol  --  119   Total Cholesterol/HDL Ratio  --  3.0     TSH:  Recent Labs   Lab 06/01/20  0926   TSH 1.111        Objective:      Vitals:    05/19/23 1402   BP: 118/78   Pulse: 78   Temp: 98.2 °F (36.8 °C)      Physical Exam  Vitals reviewed.   Constitutional:       Appearance: Normal appearance. She is overweight.   HENT:      Head: Normocephalic and atraumatic.   Eyes:      Conjunctiva/sclera: Conjunctivae normal.   Cardiovascular:      Rate and Rhythm: Normal rate and regular rhythm.      Heart sounds: Normal heart sounds.   Pulmonary:      Effort: Pulmonary effort is normal.      Breath sounds: Normal breath sounds.   Abdominal:      Palpations:  Abdomen is soft.      Tenderness: There is no abdominal tenderness.   Musculoskeletal:         General: Normal range of motion.      Cervical back: Normal range of motion.      Right lower leg: No edema.      Left lower leg: No edema.   Neurological:      General: No focal deficit present.      Mental Status: She is alert and oriented to person, place, and time.   Psychiatric:         Mood and Affect: Mood normal.         Behavior: Behavior normal.        Assessment:       1. Encounter to establish care with new doctor    2. Wellness examination    3. Essential hypertension    4. Vitamin D deficiency    5. Primary insomnia    6. Spasm of right side abdominal muscles    7. JAYLON (obstructive sleep apnea)    8. Gastroesophageal reflux disease without esophagitis    9. Increased risk of breast cancer    10. Fatty liver    11. Hair loss disorder    12. Family history of colon cancer        Plan:   1. Encounter to establish care with new doctor    2. Wellness examination    3. Essential hypertension  - losartan (COZAAR) 50 MG tablet; Take 1 tablet (50 mg total) by mouth once daily.  Dispense: 90 tablet; Refill: 3    4. Vitamin D deficiency  - ergocalciferol (ERGOCALCIFEROL) 50,000 unit Cap; Take 1 capsule (50,000 Units total) by mouth every 7 days.  Dispense: 12 capsule; Refill: 3    5. Primary insomnia  - traZODone (DESYREL) 50 MG tablet; Take 1 tab nightly; may increase by 1 tab every 3 days to a maximum of 3 tabs for insomnia.  Dispense: 90 tablet; Refill: 1  - tiZANidine (ZANAFLEX) 4 MG tablet; Take 1 tablet (4 mg total) by mouth nightly as needed (muscle spasms).  Dispense: 40 tablet; Refill: 1    6. Spasm of right side abdominal muscles  - tiZANidine (ZANAFLEX) 4 MG tablet; Take 1 tablet (4 mg total) by mouth nightly as needed (muscle spasms).  Dispense: 40 tablet; Refill: 1    7. JAYLON (obstructive sleep apnea)    8. Gastroesophageal reflux disease without esophagitis    9. Increased risk of breast cancer    10. Fatty  liver    11. Hair loss disorder    12. Family history of colon cancer       Establish care and Well female  Labs reviewed in detail  HM discussed  colonoscopy due; advised to contact [facility] to confirm   Continue healthy lifestyle efforts  Start trazodone for sleep   Start zanaflex PRN for muscle spasms  Vit D weekly   STOP amlodipine and START losartan; monitor BP and let us know if goes up   Continue current meds as prescribed otherwise; refills per request  Keep routine specialist f/u   RTC in 3 months  and/or PRN          Sarah A. Champagne [facility]   5/19/23

## 2023-05-22 ENCOUNTER — PATIENT OUTREACH (OUTPATIENT)
Dept: ADMINISTRATIVE | Facility: HOSPITAL | Age: 63
End: 2023-05-22
Payer: COMMERCIAL

## 2023-05-22 NOTE — LETTER
AUTHORIZATION FOR RELEASE OF   CONFIDENTIAL INFORMATION    EJ,    We are seeing Leschia T Bastian, date of birth 1960, in the clinic at [facility]. Gayathri Romero DO is the patient's PCP. Leschia T Bastian has an outstanding lab/procedure at the time we reviewed her chart. In order to help keep her health information updated, she has authorized us to request the following medical record(s):                                           ( XX )  COLONOSCOPY             Please fax records to [facility], Sarah A. Champagne, DO, [phone].     If you have any questions, please contact Romina Verma, Care Coordinator  at [phone].                Patient Name: Leschia T Bastian  : 1960  Patient Phone #: [phone]

## 2023-05-22 NOTE — PROGRESS NOTES
Non-compliant GAP report chart review - Chart review completed for the following HM test if overdue  (Mammogram, Colonoscopy, Cervical Cancer Screening,  Diabetic lab testing, and/or Dilated EYE EXAM)  05/22/2023    Care Everywhere and Media reports - updates requested and reviewed.        Requested   Colonoscopy  records         Health Maintenance Due   Topic Date Due    Pneumococcal Vaccines (Age 0-64) (1 - PCV) Never done    HIV Screening  Never done    Mammogram  05/12/2023

## 2023-06-01 ENCOUNTER — TELEPHONE (OUTPATIENT)
Dept: FAMILY MEDICINE | Facility: CLINIC | Age: 63
End: 2023-06-01
Payer: COMMERCIAL

## 2023-06-01 RX ORDER — PHENYLEPHRINE HCL 1 %
1 SPRAY, NON-AEROSOL (ML) NASAL NIGHTLY
Qty: 90 TABLET | Refills: 3 | Status: SHIPPED | OUTPATIENT
Start: 2023-06-01

## 2023-06-01 NOTE — TELEPHONE ENCOUNTER
Spoke back with pt she states that she does not remember the name of medication, but was told at ov that it's not a regular hormone medication that people take for hormone issues and since she has a family history oc cancer she can't take regular hormones.

## 2023-06-01 NOTE — TELEPHONE ENCOUNTER
I believe she is talking about black cohash for hot flashes. I can try to send to pharmacy but this is typically OTC; I sent to pharmacy to see if could get for her.

## 2023-06-01 NOTE — TELEPHONE ENCOUNTER
Spoke back with pt to inform her that black cohash for hot flashes was sent in to her pharmacy, but this is typically OTC; pt understood and stated that she will go to the pharmacy to see about getting it.

## 2023-06-01 NOTE — TELEPHONE ENCOUNTER
Pt calling requesting Rx for hormones to be sent in to [facility] in [place], pt states that at last office visit it was discuss.

## 2023-06-15 ENCOUNTER — PATIENT OUTREACH (OUTPATIENT)
Dept: ADMINISTRATIVE | Facility: HOSPITAL | Age: 63
End: 2023-06-15
Payer: COMMERCIAL

## 2023-06-15 NOTE — PROGRESS NOTES
Non-compliant GAP report chart review - Chart review completed for the following HM test if overdue  (Mammogram, Colonoscopy, Cervical Cancer Screening,  Diabetic lab testing, and/or Dilated EYE EXAM)  06/15/2023    Care Everywhere and Media reports - updates requested and reviewed.         Requested   Colonoscopy  records         Health Maintenance Due   Topic Date Due    Pneumococcal Vaccines (Age 0-64) (1 - PCV) Never done    HIV Screening  Never done

## 2023-06-15 NOTE — LETTER
AUTHORIZATION FOR RELEASE OF   CONFIDENTIAL INFORMATION    [facility] ,    We are seeing Leschia T Bastian, date of birth 1960, in the clinic at [facility]. Gayathri Romero DO is the patient's PCP. Leschia T Bastian has an outstanding lab/procedure at the time we reviewed her chart. In order to help keep her health information updated, she has authorized us to request the following medical record(s):                                         ( xx )  COLONOSCOPY         Please fax records to [facility], Sarah A. Champagne, DO, [phone].     If you have any questions, please contact Romina Verma, Care Coordinator  at [phone].   .           Patient Name: Leschia T Bastian  : 1960  Patient Phone #: [phone]

## 2023-08-21 ENCOUNTER — OFFICE VISIT (OUTPATIENT)
Dept: FAMILY MEDICINE | Facility: CLINIC | Age: 63
End: 2023-08-21
Payer: COMMERCIAL

## 2023-08-21 VITALS
SYSTOLIC BLOOD PRESSURE: 134 MMHG | WEIGHT: 196.44 LBS | HEART RATE: 67 BPM | HEIGHT: 69 IN | OXYGEN SATURATION: 100 % | DIASTOLIC BLOOD PRESSURE: 92 MMHG | BODY MASS INDEX: 29.09 KG/M2 | TEMPERATURE: 98 F

## 2023-08-21 DIAGNOSIS — G47.33 OSA (OBSTRUCTIVE SLEEP APNEA): ICD-10-CM

## 2023-08-21 DIAGNOSIS — K76.0 FATTY LIVER: ICD-10-CM

## 2023-08-21 DIAGNOSIS — M62.838 SPASM OF RIGHT SIDE ABDOMINAL MUSCLES: ICD-10-CM

## 2023-08-21 DIAGNOSIS — E55.9 VITAMIN D DEFICIENCY: ICD-10-CM

## 2023-08-21 DIAGNOSIS — F51.01 PRIMARY INSOMNIA: ICD-10-CM

## 2023-08-21 DIAGNOSIS — K21.9 GASTROESOPHAGEAL REFLUX DISEASE WITHOUT ESOPHAGITIS: ICD-10-CM

## 2023-08-21 DIAGNOSIS — Z91.89 INCREASED RISK OF BREAST CANCER: ICD-10-CM

## 2023-08-21 DIAGNOSIS — L65.9 HAIR LOSS DISORDER: ICD-10-CM

## 2023-08-21 DIAGNOSIS — I10 ESSENTIAL HYPERTENSION: Primary | Chronic | ICD-10-CM

## 2023-08-21 DIAGNOSIS — R79.9 ABNORMAL BLOOD CELL COUNT: ICD-10-CM

## 2023-08-21 PROCEDURE — 93005 ELECTROCARDIOGRAM TRACING: CPT | Mod: S$GLB,,, | Performed by: STUDENT IN AN ORGANIZED HEALTH CARE EDUCATION/TRAINING PROGRAM

## 2023-08-21 PROCEDURE — 3075F SYST BP GE 130 - 139MM HG: CPT | Mod: CPTII,S$GLB,, | Performed by: STUDENT IN AN ORGANIZED HEALTH CARE EDUCATION/TRAINING PROGRAM

## 2023-08-21 PROCEDURE — 99999 PR PBB SHADOW E&M-EST. PATIENT-LVL III: CPT | Mod: PBBFAC,,, | Performed by: STUDENT IN AN ORGANIZED HEALTH CARE EDUCATION/TRAINING PROGRAM

## 2023-08-21 PROCEDURE — 3080F DIAST BP >= 90 MM HG: CPT | Mod: CPTII,S$GLB,, | Performed by: STUDENT IN AN ORGANIZED HEALTH CARE EDUCATION/TRAINING PROGRAM

## 2023-08-21 PROCEDURE — 4010F PR ACE/ARB THEARPY RXD/TAKEN: ICD-10-PCS | Mod: CPTII,S$GLB,, | Performed by: STUDENT IN AN ORGANIZED HEALTH CARE EDUCATION/TRAINING PROGRAM

## 2023-08-21 PROCEDURE — 4010F ACE/ARB THERAPY RXD/TAKEN: CPT | Mod: CPTII,S$GLB,, | Performed by: STUDENT IN AN ORGANIZED HEALTH CARE EDUCATION/TRAINING PROGRAM

## 2023-08-21 PROCEDURE — 1159F MED LIST DOCD IN RCRD: CPT | Mod: CPTII,S$GLB,, | Performed by: STUDENT IN AN ORGANIZED HEALTH CARE EDUCATION/TRAINING PROGRAM

## 2023-08-21 PROCEDURE — 1160F RVW MEDS BY RX/DR IN RCRD: CPT | Mod: CPTII,S$GLB,, | Performed by: STUDENT IN AN ORGANIZED HEALTH CARE EDUCATION/TRAINING PROGRAM

## 2023-08-21 PROCEDURE — 99999 PR PBB SHADOW E&M-EST. PATIENT-LVL III: ICD-10-PCS | Mod: PBBFAC,,, | Performed by: STUDENT IN AN ORGANIZED HEALTH CARE EDUCATION/TRAINING PROGRAM

## 2023-08-21 PROCEDURE — 3008F PR BODY MASS INDEX (BMI) DOCUMENTED: ICD-10-PCS | Mod: CPTII,S$GLB,, | Performed by: STUDENT IN AN ORGANIZED HEALTH CARE EDUCATION/TRAINING PROGRAM

## 2023-08-21 PROCEDURE — 1159F PR MEDICATION LIST DOCUMENTED IN MEDICAL RECORD: ICD-10-PCS | Mod: CPTII,S$GLB,, | Performed by: STUDENT IN AN ORGANIZED HEALTH CARE EDUCATION/TRAINING PROGRAM

## 2023-08-21 PROCEDURE — 99214 OFFICE O/P EST MOD 30 MIN: CPT | Mod: S$GLB,,, | Performed by: STUDENT IN AN ORGANIZED HEALTH CARE EDUCATION/TRAINING PROGRAM

## 2023-08-21 PROCEDURE — 1160F PR REVIEW ALL MEDS BY PRESCRIBER/CLIN PHARMACIST DOCUMENTED: ICD-10-PCS | Mod: CPTII,S$GLB,, | Performed by: STUDENT IN AN ORGANIZED HEALTH CARE EDUCATION/TRAINING PROGRAM

## 2023-08-21 PROCEDURE — 3080F PR MOST RECENT DIASTOLIC BLOOD PRESSURE >= 90 MM HG: ICD-10-PCS | Mod: CPTII,S$GLB,, | Performed by: STUDENT IN AN ORGANIZED HEALTH CARE EDUCATION/TRAINING PROGRAM

## 2023-08-21 PROCEDURE — 3075F PR MOST RECENT SYSTOLIC BLOOD PRESS GE 130-139MM HG: ICD-10-PCS | Mod: CPTII,S$GLB,, | Performed by: STUDENT IN AN ORGANIZED HEALTH CARE EDUCATION/TRAINING PROGRAM

## 2023-08-21 PROCEDURE — 99214 PR OFFICE/OUTPT VISIT, EST, LEVL IV, 30-39 MIN: ICD-10-PCS | Mod: S$GLB,,, | Performed by: STUDENT IN AN ORGANIZED HEALTH CARE EDUCATION/TRAINING PROGRAM

## 2023-08-21 PROCEDURE — 93010 ELECTROCARDIOGRAM REPORT: CPT | Mod: S$GLB,,, | Performed by: INTERNAL MEDICINE

## 2023-08-21 PROCEDURE — 93005 EKG 12-LEAD: ICD-10-PCS | Mod: S$GLB,,, | Performed by: STUDENT IN AN ORGANIZED HEALTH CARE EDUCATION/TRAINING PROGRAM

## 2023-08-21 PROCEDURE — 3008F BODY MASS INDEX DOCD: CPT | Mod: CPTII,S$GLB,, | Performed by: STUDENT IN AN ORGANIZED HEALTH CARE EDUCATION/TRAINING PROGRAM

## 2023-08-21 PROCEDURE — 93010 EKG 12-LEAD: ICD-10-PCS | Mod: S$GLB,,, | Performed by: INTERNAL MEDICINE

## 2023-08-21 RX ORDER — VALSARTAN AND HYDROCHLOROTHIAZIDE 160; 12.5 MG/1; MG/1
1 TABLET, FILM COATED ORAL DAILY
Qty: 90 TABLET | Refills: 3 | Status: SHIPPED | OUTPATIENT
Start: 2023-08-21 | End: 2024-08-20

## 2023-08-21 NOTE — PROGRESS NOTES
Subjective:       Patient ID: Leschia T Bastian is a 63 y.o. female.    Chief Complaint: Follow-up (Pt here for a 3 month follow up )      Active Problem List with Overview Notes    Diagnosis Date Noted    Primary insomnia 05/19/2023     trazodone QHS PRN  Sleeps most nights  CPAP helps as well   Has woken few times with headaches       Spasm of right side abdominal muscles 05/19/2023     Comes and goes  Positional   S/p ROLDAN (open) and gallbladder out  likely scar tissue related possibly nerve irritation   zanaflex QHS PRN       Hair loss disorder 05/19/2023     Follows with hair growth specialist OSH  Started new supplement  Also interested in injections   Will f/u with them soon      Family history of colon cancer 05/19/2023     Brother  Q2-3 year colonoscopies   [facility]   Last 2021      JAYLON (obstructive sleep apnea) 02/18/2022     Back on CPAP   Big difference   Feels a lot better overall       Fatty liver 01/28/2021     Labs stable       Kidney cyst, acquired 01/28/2021    Overweight (BMI 25.0-29.9) 01/05/2021    Increased risk of breast cancer 01/04/2021     + fam hx; mother, sister  Follows with GYN   Rotates MRI and mammo every other year though this year cannot afford MRI so is repeating mammo instead       Vitamin D deficiency 08/01/2017     56673 IU weekly       GERD (gastroesophageal reflux disease) 09/27/2012     PPI daily   Follows with GI [facility]       Essential hypertension      Elevated today   Losartan 50   + headaches in night  Feels needs adjustment; was on 100mg in past          Review of Systems   Respiratory:  Positive for chest tightness.    Musculoskeletal:  Positive for myalgias.   Neurological:  Positive for headaches.   Psychiatric/Behavioral:  Positive for sleep disturbance.    All other systems reviewed and are negative.       A1C:  Recent Labs   Lab 01/15/21  0829   Hemoglobin A1C 5.3     CBC:  Recent Labs   Lab 07/19/21  0803 04/13/22  1600 11/07/22  1603   WBC 7.86 4.66 3.48 L   RBC  5.25 5.50 H 5.65 H   Hemoglobin 12.0 12.3 13.1   Hematocrit 40.6 42.6 43.1   Platelets 214 257 219   MCV 77 L 78 L 76 L   MCH 22.9 L 22.4 L 23.2 L   MCHC 29.6 L 28.9 L 30.4 L     CMP:  Recent Labs   Lab 07/19/21  0803 04/08/22  0755 11/07/22  1603   Glucose 103 96 90   Calcium 9.8 9.2 9.4   Albumin 4.7 4.6 4.5   Total Protein 8.0 8.0 7.7   Sodium 140 142 142   Potassium 4.0 4.5 4.0   CO2 28 28 27   Chloride 104 103 106   BUN 9 13 10   Creatinine 0.67 0.77 0.69   Alkaline Phosphatase 99 93 101   ALT 45 H 61 H 40   AST 50 H 43 32   Total Bilirubin 1.3 H 0.7 0.9     LIPIDS:  Recent Labs   Lab 01/04/21  1026   HDL 60   Cholesterol 179   Triglycerides 68   LDL Cholesterol 105.4   HDL/Cholesterol Ratio 33.5   Non-HDL Cholesterol 119   Total Cholesterol/HDL Ratio 3.0     TSH:         Objective:      Vitals:    08/21/23 1552   BP: (!) 134/92   Pulse: 67   Temp: 97.9 °F (36.6 °C)      Physical Exam  Vitals reviewed.   Constitutional:       Appearance: Normal appearance. She is overweight.   HENT:      Head: Normocephalic and atraumatic.   Eyes:      Conjunctiva/sclera: Conjunctivae normal.   Cardiovascular:      Rate and Rhythm: Normal rate and regular rhythm.      Heart sounds: Normal heart sounds.   Pulmonary:      Effort: Pulmonary effort is normal.      Breath sounds: Normal breath sounds.   Abdominal:      Palpations: Abdomen is soft.      Tenderness: There is no abdominal tenderness.   Musculoskeletal:         General: Normal range of motion.      Cervical back: Normal range of motion.      Right lower leg: No edema.      Left lower leg: No edema.   Neurological:      Mental Status: She is alert. Mental status is at baseline.   Psychiatric:         Mood and Affect: Mood normal.         Behavior: Behavior normal.          Assessment:       1. Essential hypertension    2. Vitamin D deficiency    3. Abnormal blood cell count    4. Hair loss disorder    5. Increased risk of breast cancer    6. Gastroesophageal reflux  disease without esophagitis    7. Fatty liver    8. Spasm of right side abdominal muscles    9. JAYLON (obstructive sleep apnea)    10. Primary insomnia        Plan:   1. Essential hypertension  - Hemoglobin A1C; Standing  - Lipid Panel; Standing  - TSH; Standing  - IN OFFICE EKG 12-LEAD (to Muse)  - CBC Without Differential; Standing  - Comprehensive Metabolic Panel; Standing  - HEMOGLOBIN ELECTROPHORESIS; Future    2. Vitamin D deficiency  - Vitamin D 25-Hydroxy; Future    3. Abnormal blood cell count  - CBC Without Differential; Standing  - HEMOGLOBIN ELECTROPHORESIS; Future    4. Hair loss disorder    5. Increased risk of breast cancer    6. Gastroesophageal reflux disease without esophagitis    7. Fatty liver    8. Spasm of right side abdominal muscles    9. JAYLON (obstructive sleep apnea)    10. Primary insomnia       Labs per orders    EKG today 2/2 chest heaviness; consider stress test (last 1 5 years ago)  Continue healthy lifestyle efforts  STOP losartan; START valsartan/HCTZ 160/12.5 today   Continue current meds as prescribed otherwise; refills per request  Keep routine specialist f/u   RTC in 2 weeks for BP check with NP and 3 months  with labs prior and/or PRN          Sarah A. Champagne [facility]   8/21/23

## 2023-09-12 ENCOUNTER — LAB VISIT (OUTPATIENT)
Dept: LAB | Facility: HOSPITAL | Age: 63
End: 2023-09-12
Attending: STUDENT IN AN ORGANIZED HEALTH CARE EDUCATION/TRAINING PROGRAM
Payer: COMMERCIAL

## 2023-09-12 DIAGNOSIS — R79.9 ABNORMAL BLOOD CELL COUNT: ICD-10-CM

## 2023-09-12 DIAGNOSIS — I10 ESSENTIAL HYPERTENSION: Chronic | ICD-10-CM

## 2023-09-12 LAB
ALBUMIN SERPL BCP-MCNC: 4.4 G/DL (ref 3.5–5.2)
ALP SERPL-CCNC: 94 U/L (ref 38–126)
ALT SERPL W/O P-5'-P-CCNC: 82 U/L (ref 10–44)
ANION GAP SERPL CALC-SCNC: 7 MMOL/L (ref 8–16)
AST SERPL-CCNC: 41 U/L (ref 15–46)
BILIRUB SERPL-MCNC: 0.6 MG/DL (ref 0.1–1)
CALCIUM SERPL-MCNC: 9.8 MG/DL (ref 8.7–10.5)
CHLORIDE SERPL-SCNC: 104 MMOL/L (ref 95–110)
CO2 SERPL-SCNC: 30 MMOL/L (ref 23–29)
CREAT SERPL-MCNC: 0.88 MG/DL (ref 0.5–1.4)
ERYTHROCYTE [DISTWIDTH] IN BLOOD BY AUTOMATED COUNT: 13.9 % (ref 11.5–14.5)
EST. GFR  (NO RACE VARIABLE): >60 ML/MIN/1.73 M^2
GLUCOSE SERPL-MCNC: 86 MG/DL (ref 70–110)
HCT VFR BLD AUTO: 40.6 % (ref 37–48.5)
HGB BLD-MCNC: 12.2 G/DL (ref 12–16)
MCH RBC QN AUTO: 23.2 PG (ref 27–31)
MCHC RBC AUTO-ENTMCNC: 30 G/DL (ref 32–36)
MCV RBC AUTO: 77 FL (ref 82–98)
PLATELET # BLD AUTO: 229 K/UL (ref 150–450)
PMV BLD AUTO: 11.2 FL (ref 9.2–12.9)
POTASSIUM SERPL-SCNC: 3.9 MMOL/L (ref 3.5–5.1)
PROT SERPL-MCNC: 7.5 G/DL (ref 6–8.4)
RBC # BLD AUTO: 5.26 M/UL (ref 4–5.4)
SODIUM SERPL-SCNC: 141 MMOL/L (ref 136–145)
UUN UR-MCNC: 16 MG/DL (ref 7–17)
WBC # BLD AUTO: 3.92 K/UL (ref 3.9–12.7)

## 2023-09-12 PROCEDURE — 36415 COLL VENOUS BLD VENIPUNCTURE: CPT | Mod: PO | Performed by: STUDENT IN AN ORGANIZED HEALTH CARE EDUCATION/TRAINING PROGRAM

## 2023-09-12 PROCEDURE — 83020 HEMOGLOBIN ELECTROPHORESIS: CPT | Mod: PO | Performed by: STUDENT IN AN ORGANIZED HEALTH CARE EDUCATION/TRAINING PROGRAM

## 2023-09-12 PROCEDURE — 85027 COMPLETE CBC AUTOMATED: CPT | Mod: PO | Performed by: STUDENT IN AN ORGANIZED HEALTH CARE EDUCATION/TRAINING PROGRAM

## 2023-09-12 PROCEDURE — 80053 COMPREHEN METABOLIC PANEL: CPT | Mod: PO | Performed by: STUDENT IN AN ORGANIZED HEALTH CARE EDUCATION/TRAINING PROGRAM

## 2023-09-13 LAB
HGB A2 MFR BLD HPLC: 2 % (ref 2.2–3.2)
HGB FRACT BLD ELPH-IMP: ABNORMAL
HGB FRACT BLD ELPH-IMP: NORMAL

## 2023-09-15 ENCOUNTER — OFFICE VISIT (OUTPATIENT)
Dept: FAMILY MEDICINE | Facility: CLINIC | Age: 63
End: 2023-09-15
Payer: COMMERCIAL

## 2023-09-15 VITALS
HEIGHT: 69 IN | BODY MASS INDEX: 28.71 KG/M2 | WEIGHT: 193.81 LBS | HEART RATE: 92 BPM | DIASTOLIC BLOOD PRESSURE: 82 MMHG | SYSTOLIC BLOOD PRESSURE: 128 MMHG | TEMPERATURE: 98 F | OXYGEN SATURATION: 99 %

## 2023-09-15 DIAGNOSIS — R06.02 SHORTNESS OF BREATH: ICD-10-CM

## 2023-09-15 DIAGNOSIS — K21.9 GASTROESOPHAGEAL REFLUX DISEASE WITHOUT ESOPHAGITIS: ICD-10-CM

## 2023-09-15 DIAGNOSIS — I10 ESSENTIAL HYPERTENSION: Primary | Chronic | ICD-10-CM

## 2023-09-15 PROCEDURE — 1160F PR REVIEW ALL MEDS BY PRESCRIBER/CLIN PHARMACIST DOCUMENTED: ICD-10-PCS | Mod: CPTII,S$GLB,, | Performed by: PEDIATRICS

## 2023-09-15 PROCEDURE — 1160F RVW MEDS BY RX/DR IN RCRD: CPT | Mod: CPTII,S$GLB,, | Performed by: PEDIATRICS

## 2023-09-15 PROCEDURE — 4010F ACE/ARB THERAPY RXD/TAKEN: CPT | Mod: CPTII,S$GLB,, | Performed by: PEDIATRICS

## 2023-09-15 PROCEDURE — 99999 PR PBB SHADOW E&M-EST. PATIENT-LVL IV: CPT | Mod: PBBFAC,,, | Performed by: PEDIATRICS

## 2023-09-15 PROCEDURE — 3079F PR MOST RECENT DIASTOLIC BLOOD PRESSURE 80-89 MM HG: ICD-10-PCS | Mod: CPTII,S$GLB,, | Performed by: PEDIATRICS

## 2023-09-15 PROCEDURE — 3074F SYST BP LT 130 MM HG: CPT | Mod: CPTII,S$GLB,, | Performed by: PEDIATRICS

## 2023-09-15 PROCEDURE — 3008F PR BODY MASS INDEX (BMI) DOCUMENTED: ICD-10-PCS | Mod: CPTII,S$GLB,, | Performed by: PEDIATRICS

## 2023-09-15 PROCEDURE — 99214 OFFICE O/P EST MOD 30 MIN: CPT | Mod: S$GLB,,, | Performed by: PEDIATRICS

## 2023-09-15 PROCEDURE — 3008F BODY MASS INDEX DOCD: CPT | Mod: CPTII,S$GLB,, | Performed by: PEDIATRICS

## 2023-09-15 PROCEDURE — 3074F PR MOST RECENT SYSTOLIC BLOOD PRESSURE < 130 MM HG: ICD-10-PCS | Mod: CPTII,S$GLB,, | Performed by: PEDIATRICS

## 2023-09-15 PROCEDURE — 99214 PR OFFICE/OUTPT VISIT, EST, LEVL IV, 30-39 MIN: ICD-10-PCS | Mod: S$GLB,,, | Performed by: PEDIATRICS

## 2023-09-15 PROCEDURE — 4010F PR ACE/ARB THEARPY RXD/TAKEN: ICD-10-PCS | Mod: CPTII,S$GLB,, | Performed by: PEDIATRICS

## 2023-09-15 PROCEDURE — 3079F DIAST BP 80-89 MM HG: CPT | Mod: CPTII,S$GLB,, | Performed by: PEDIATRICS

## 2023-09-15 PROCEDURE — 99999 PR PBB SHADOW E&M-EST. PATIENT-LVL IV: ICD-10-PCS | Mod: PBBFAC,,, | Performed by: PEDIATRICS

## 2023-09-15 PROCEDURE — 1159F PR MEDICATION LIST DOCUMENTED IN MEDICAL RECORD: ICD-10-PCS | Mod: CPTII,S$GLB,, | Performed by: PEDIATRICS

## 2023-09-15 PROCEDURE — 1159F MED LIST DOCD IN RCRD: CPT | Mod: CPTII,S$GLB,, | Performed by: PEDIATRICS

## 2023-09-15 NOTE — PROGRESS NOTES
Subjective:      Patient ID: Leschia T Bastian is a 63 y.o. female.    Chief Complaint: Follow-up (3 week follow up, pt has been feeling off balance/ dizzy)    Patient here today for blood pressure check. Denies sob, chest pain, dizziness, headache, blurry vision. Reports taking bp at home, ranges from 110-145/70-96      Follow-up  Pertinent negatives include no arthralgias, chest pain, chills, congestion, coughing, fatigue, fever, headaches, nausea, rash, sore throat or vomiting.     Review of Systems   Constitutional:  Negative for chills, fatigue and fever.   HENT:  Negative for congestion, ear pain, postnasal drip, rhinorrhea, sinus pressure, sinus pain, sneezing and sore throat.    Respiratory:  Negative for cough, chest tightness, shortness of breath and wheezing.    Cardiovascular:  Negative for chest pain and palpitations.   Gastrointestinal:  Negative for diarrhea, nausea and vomiting.   Genitourinary:  Negative for difficulty urinating.   Musculoskeletal:  Negative for arthralgias.   Skin:  Negative for rash.   Neurological:  Negative for dizziness and headaches.   Psychiatric/Behavioral:  Negative for confusion.         Current Outpatient Medications on File Prior to Visit   Medication Sig    b.coh-tea-ginsg-paulette-hops-thean (BLACK COHOSH MENOPAUSE COMPLEX) -50(d)/20 -200-100 mg (n) TbSQ Take 1 tablet by mouth every evening.    clobetasoL (CLOBEX) 0.05 % shampoo APPLY TO SCALP IN PLACE OF REGULAR SHAMPOO WEEKLY, LEAVE ON FOR 5-10 MINUTES.    dicyclomine (BENTYL) 10 MG capsule Take 10 mg by mouth 2 (two) times daily.    EPINEPHrine (EPIPEN) 0.3 mg/0.3 mL AtIn one injection Injection as needed for throat closing, difficulty breathing, trouble swallowing for 30 days    ergocalciferol (ERGOCALCIFEROL) 50,000 unit Cap Take 1 capsule (50,000 Units total) by mouth every 7 days.    hydrocortisone 2.5 % cream     ketoconazole (NIZORAL) 2 % cream     LINZESS 72 mcg Cap capsule Take 72 mcg by mouth once daily.     omeprazole (PRILOSEC) 40 MG capsule Take 1 capsule (40 mg total) by mouth once daily.    tiZANidine (ZANAFLEX) 4 MG tablet Take 1 tablet (4 mg total) by mouth nightly as needed (muscle spasms).    traZODone (DESYREL) 50 MG tablet Take 1 tab nightly; may increase by 1 tab every 3 days to a maximum of 3 tabs for insomnia.    triamcinolone acetonide 0.1% (KENALOG) 0.1 % cream     valsartan-hydrochlorothiazide (DIOVAN-HCT) 160-12.5 mg per tablet Take 1 tablet by mouth once daily.     No current facility-administered medications on file prior to visit.        Objective:     Vitals:    09/15/23 1504   BP: 128/82   Pulse: 92   Temp: 98.1 °F (36.7 °C)      Physical Exam  Constitutional:       General: She is not in acute distress.     Appearance: Normal appearance.   HENT:      Head: Normocephalic and atraumatic.      Right Ear: External ear normal.      Left Ear: External ear normal.      Nose: Nose normal.      Mouth/Throat:      Mouth: Mucous membranes are moist.      Pharynx: Oropharynx is clear.   Eyes:      Extraocular Movements: Extraocular movements intact.      Conjunctiva/sclera: Conjunctivae normal.      Pupils: Pupils are equal, round, and reactive to light.   Cardiovascular:      Rate and Rhythm: Normal rate and regular rhythm.      Pulses: Normal pulses.      Heart sounds: Normal heart sounds.   Pulmonary:      Effort: Pulmonary effort is normal. No respiratory distress.      Breath sounds: Normal breath sounds. No wheezing.   Abdominal:      General: Abdomen is flat.   Musculoskeletal:         General: No swelling. Normal range of motion.      Cervical back: Normal range of motion and neck supple.   Skin:     General: Skin is warm and dry.      Findings: No rash.   Neurological:      Mental Status: She is alert and oriented to person, place, and time.      Gait: Gait normal.   Psychiatric:         Mood and Affect: Mood normal.         Behavior: Behavior normal.        Assessment:         1. Essential  hypertension    2. Shortness of breath    3. Gastroesophageal reflux disease without esophagitis          Plan:   1. Essential hypertension  - Exercise Stress - EKG; Future    2. Shortness of breath  - Exercise Stress - EKG; Future    3. Gastroesophageal reflux disease without esophagitis  - Ambulatory referral/consult to Gastroenterology; Future       Medication change made today.  Continue checking bp twice daily.  Record bp and bring to next visit.  ER Precautions discussed.  Contact clinic for any concerns.  Follow up if bp staying >140/90      Follow up if symptoms worsen or fail to improve.       Donald De Leon NP   [facility]  9/15/23

## 2023-10-18 ENCOUNTER — OFFICE VISIT (OUTPATIENT)
Dept: GASTROENTEROLOGY | Facility: CLINIC | Age: 63
End: 2023-10-18
Payer: COMMERCIAL

## 2023-10-18 VITALS
HEIGHT: 69 IN | SYSTOLIC BLOOD PRESSURE: 148 MMHG | HEART RATE: 76 BPM | OXYGEN SATURATION: 100 % | DIASTOLIC BLOOD PRESSURE: 88 MMHG | BODY MASS INDEX: 28.8 KG/M2 | WEIGHT: 194.44 LBS

## 2023-10-18 DIAGNOSIS — Z86.010 PERSONAL HISTORY OF COLONIC POLYPS: ICD-10-CM

## 2023-10-18 DIAGNOSIS — Z80.0 FAMILY HISTORY OF COLON CANCER: ICD-10-CM

## 2023-10-18 DIAGNOSIS — K21.9 GASTROESOPHAGEAL REFLUX DISEASE WITHOUT ESOPHAGITIS: Primary | ICD-10-CM

## 2023-10-18 DIAGNOSIS — K59.04 CHRONIC IDIOPATHIC CONSTIPATION: ICD-10-CM

## 2023-10-18 PROBLEM — Z86.0100 PERSONAL HISTORY OF COLONIC POLYPS: Status: ACTIVE | Noted: 2023-10-18

## 2023-10-18 PROCEDURE — 99999 PR PBB SHADOW E&M-EST. PATIENT-LVL V: CPT | Mod: PBBFAC,,, | Performed by: INTERNAL MEDICINE

## 2023-10-18 PROCEDURE — 3008F PR BODY MASS INDEX (BMI) DOCUMENTED: ICD-10-PCS | Mod: CPTII,S$GLB,, | Performed by: INTERNAL MEDICINE

## 2023-10-18 PROCEDURE — 4010F ACE/ARB THERAPY RXD/TAKEN: CPT | Mod: CPTII,S$GLB,, | Performed by: INTERNAL MEDICINE

## 2023-10-18 PROCEDURE — 3079F PR MOST RECENT DIASTOLIC BLOOD PRESSURE 80-89 MM HG: ICD-10-PCS | Mod: CPTII,S$GLB,, | Performed by: INTERNAL MEDICINE

## 2023-10-18 PROCEDURE — 3079F DIAST BP 80-89 MM HG: CPT | Mod: CPTII,S$GLB,, | Performed by: INTERNAL MEDICINE

## 2023-10-18 PROCEDURE — 1160F RVW MEDS BY RX/DR IN RCRD: CPT | Mod: CPTII,S$GLB,, | Performed by: INTERNAL MEDICINE

## 2023-10-18 PROCEDURE — 99204 OFFICE O/P NEW MOD 45 MIN: CPT | Mod: S$GLB,,, | Performed by: INTERNAL MEDICINE

## 2023-10-18 PROCEDURE — 1159F PR MEDICATION LIST DOCUMENTED IN MEDICAL RECORD: ICD-10-PCS | Mod: CPTII,S$GLB,, | Performed by: INTERNAL MEDICINE

## 2023-10-18 PROCEDURE — 4010F PR ACE/ARB THEARPY RXD/TAKEN: ICD-10-PCS | Mod: CPTII,S$GLB,, | Performed by: INTERNAL MEDICINE

## 2023-10-18 PROCEDURE — 1159F MED LIST DOCD IN RCRD: CPT | Mod: CPTII,S$GLB,, | Performed by: INTERNAL MEDICINE

## 2023-10-18 PROCEDURE — 99999 PR PBB SHADOW E&M-EST. PATIENT-LVL V: ICD-10-PCS | Mod: PBBFAC,,, | Performed by: INTERNAL MEDICINE

## 2023-10-18 PROCEDURE — 3077F PR MOST RECENT SYSTOLIC BLOOD PRESSURE >= 140 MM HG: ICD-10-PCS | Mod: CPTII,S$GLB,, | Performed by: INTERNAL MEDICINE

## 2023-10-18 PROCEDURE — 1160F PR REVIEW ALL MEDS BY PRESCRIBER/CLIN PHARMACIST DOCUMENTED: ICD-10-PCS | Mod: CPTII,S$GLB,, | Performed by: INTERNAL MEDICINE

## 2023-10-18 PROCEDURE — 99204 PR OFFICE/OUTPT VISIT, NEW, LEVL IV, 45-59 MIN: ICD-10-PCS | Mod: S$GLB,,, | Performed by: INTERNAL MEDICINE

## 2023-10-18 PROCEDURE — 3077F SYST BP >= 140 MM HG: CPT | Mod: CPTII,S$GLB,, | Performed by: INTERNAL MEDICINE

## 2023-10-18 PROCEDURE — 3008F BODY MASS INDEX DOCD: CPT | Mod: CPTII,S$GLB,, | Performed by: INTERNAL MEDICINE

## 2023-10-18 NOTE — PATIENT INSTRUCTIONS
EGD Prep Instructions    [facility] [address] [place]  [address]    You are scheduled for an EGD with Dr. Lagos on Thursday, November 9 at [facility].  You will enter through the [facility] entrance and check in at [facility] Day [facility].    Nothing to EAT or DRINK after midnight before the procedure.  You MAY brush your teeth.    You MAY take your blood pressure, heart, and seizure medication on the morning of the procedure, with a SIP of water.  Hold ALL other medications until after the procedure.    You must have someone with you to DRIVE YOU HOME since you will be receiving IV sedation for the procedure.    If you are on blood thinners THAT YOU HAVE BEEN INSTRUCTED TO HOLD BY YOUR DOCTOR FOR THIS PROCEDURE, then do NOT take this the morning of your EGD.  Do NOT stop these medications on your own, they must be approved to be held by your doctor.  Your EGD can NOT be done if you are on these medications.  Examples of blood thinners include: Coumadin, Aggrenox, Plavix, Pradaxa, Reapro, Pletal, Xarelto, Ticagrelor, Brilinta, Eliquis.  You do not have to stop baby aspirin 81 mg.    You will receive a call a few days before your EGD to tell you the time to arrive.  If you have not received a call by the day before your procedure, call the Pre-op Coordinator at [phone].

## 2023-10-23 ENCOUNTER — PATIENT MESSAGE (OUTPATIENT)
Dept: ADMINISTRATIVE | Facility: HOSPITAL | Age: 63
End: 2023-10-23
Payer: COMMERCIAL

## 2023-10-26 ENCOUNTER — PATIENT MESSAGE (OUTPATIENT)
Dept: ADMINISTRATIVE | Facility: HOSPITAL | Age: 63
End: 2023-10-26
Payer: COMMERCIAL

## 2023-10-27 ENCOUNTER — PATIENT OUTREACH (OUTPATIENT)
Dept: ADMINISTRATIVE | Facility: HOSPITAL | Age: 63
End: 2023-10-27
Payer: COMMERCIAL

## 2023-10-28 NOTE — PROGRESS NOTES
"Subjective     Patient ID: Leschia T Bastian is a 63 y.o. female.    Chief Complaint: colonoscopy, Gastroesophageal Reflux, Constipation, Abdominal Pain, and Nausea    64 yo F here for evaluation of GERD and constipation.  She has seen several GI providers in the past including [facility], [facility], and [facility].  She was last seen at [facility] and was on Linzess but then her doctor retired (Charles).  She was given samples and thus it was difficult to use the Linzess daily.  Using it 3 times per week gave her hard pebble-like stools.  She has been on PPI for years, taking 9-10 am daily.  She has a burning sensation in her upper belly, that feels similar to her previous gallbladder issues, despite taking the PPI.    She has had several colonoscopies, and they were yearly for a bit due to polyps.  Her brother had colon cancer at the age of 47.    Review of Systems   Constitutional:  Negative for chills and fever.   Eyes:  Negative for photophobia and visual disturbance.   Respiratory:  Negative for shortness of breath and wheezing.    Cardiovascular:  Negative for chest pain, palpitations and leg swelling.   Gastrointestinal:  Positive for abdominal pain, constipation and reflux.   Genitourinary:  Negative for dysuria and hematuria.   Musculoskeletal:  Negative for joint swelling and myalgias.   Integumentary:  Negative for color change and rash.   Neurological:  Negative for dizziness and speech difficulty.   Psychiatric/Behavioral:  Negative for confusion and hallucinations.       Objective   BP (!) 148/88 (BP Location: Right arm, Patient Position: Sitting, BP Method: Medium (Manual))   Pulse 76   Ht 5' 9" (1.753 m)   Wt 88.2 kg (194 lb 7.1 oz)   LMP 05/01/2018   SpO2 100%   BMI 28.71 kg/m²     Physical Exam  Constitutional:       Appearance: Normal appearance. She is well-developed.   HENT:      Head: Normocephalic and atraumatic.   Eyes:      Extraocular Movements: Extraocular movements intact.      Pupils: Pupils are " equal, round, and reactive to light.   Pulmonary:      Effort: Pulmonary effort is normal. No respiratory distress.   Abdominal:      General: There is no distension.      Palpations: Abdomen is soft.   Musculoskeletal:         General: No signs of injury. Normal range of motion.      Cervical back: Normal range of motion and neck supple.   Neurological:      General: No focal deficit present.      Mental Status: She is alert and oriented to person, place, and time.   Psychiatric:         Mood and Affect: Mood normal.         Behavior: Behavior normal.         Thought Content: Thought content normal.         Judgment: Judgment normal.       Lab Results   Component Value Date    WBC 3.92 09/12/2023    HGB 12.2 09/12/2023    HCT 40.6 09/12/2023    MCV 77 (L) 09/12/2023     09/12/2023     CXR was independently visualized and reviewed by me and showed no acute changes.    Old records from our chart reviewed and summarized, significant for:  Colonoscopy 1/2021:  no polyps, repeat 5 years     Assessment and Plan     1. Gastroesophageal reflux disease without esophagitis  -     Case Request Endoscopy: EGD (ESOPHAGOGASTRODUODENOSCOPY)  -     Cont PPI    2. Chronic idiopathic constipation  -     X-Ray Abdomen Flat And Erect; Future; Expected date: 10/18/2023  -     This will help determine best medication for her constipation    3. Family history of colon cancer and Personal history of colonic polyps        -     She is due for repeat 1/2026      ADDENDUM:  Large amount of stool in her colon.  She will need MOM clean out and then daily Linzess, Rx sent.  Chronic idiopathic constipation  -     linaCLOtide (LINZESS) 72 mcg Cap capsule; Take 1 capsule (72 mcg total) by mouth once daily.  Dispense: 30 capsule; Refill: 11

## 2023-10-30 ENCOUNTER — PATIENT OUTREACH (OUTPATIENT)
Dept: ADMINISTRATIVE | Facility: HOSPITAL | Age: 63
End: 2023-10-30
Payer: COMMERCIAL

## 2023-10-30 ENCOUNTER — TELEPHONE (OUTPATIENT)
Dept: ADMINISTRATIVE | Facility: HOSPITAL | Age: 63
End: 2023-10-30
Payer: COMMERCIAL

## 2023-10-30 VITALS — DIASTOLIC BLOOD PRESSURE: 83 MMHG | SYSTOLIC BLOOD PRESSURE: 116 MMHG

## 2023-11-01 ENCOUNTER — LAB VISIT (OUTPATIENT)
Dept: LAB | Facility: HOSPITAL | Age: 63
End: 2023-11-01
Attending: STUDENT IN AN ORGANIZED HEALTH CARE EDUCATION/TRAINING PROGRAM
Payer: COMMERCIAL

## 2023-11-01 DIAGNOSIS — I10 ESSENTIAL HYPERTENSION: Chronic | ICD-10-CM

## 2023-11-01 DIAGNOSIS — R79.9 ABNORMAL BLOOD CELL COUNT: ICD-10-CM

## 2023-11-01 LAB
ALBUMIN SERPL BCP-MCNC: 4.6 G/DL (ref 3.5–5.2)
ALP SERPL-CCNC: 102 U/L (ref 55–135)
ALT SERPL W/O P-5'-P-CCNC: 50 U/L (ref 10–44)
ANION GAP SERPL CALC-SCNC: 10 MMOL/L (ref 8–16)
AST SERPL-CCNC: 35 U/L (ref 10–40)
BILIRUB SERPL-MCNC: 0.9 MG/DL (ref 0.1–1)
BUN SERPL-MCNC: 21 MG/DL (ref 8–23)
CALCIUM SERPL-MCNC: 10.3 MG/DL (ref 8.7–10.5)
CHLORIDE SERPL-SCNC: 102 MMOL/L (ref 95–110)
CHOLEST SERPL-MCNC: 184 MG/DL (ref 120–199)
CHOLEST/HDLC SERPL: 3.4 {RATIO} (ref 2–5)
CO2 SERPL-SCNC: 27 MMOL/L (ref 23–29)
CREAT SERPL-MCNC: 0.9 MG/DL (ref 0.5–1.4)
ERYTHROCYTE [DISTWIDTH] IN BLOOD BY AUTOMATED COUNT: 14.2 % (ref 11.5–14.5)
EST. GFR  (NO RACE VARIABLE): >60 ML/MIN/1.73 M^2
ESTIMATED AVG GLUCOSE: 105 MG/DL (ref 68–131)
GLUCOSE SERPL-MCNC: 106 MG/DL (ref 70–110)
HBA1C MFR BLD: 5.3 % (ref 4–5.6)
HCT VFR BLD AUTO: 44.4 % (ref 37–48.5)
HDLC SERPL-MCNC: 54 MG/DL (ref 40–75)
HDLC SERPL: 29.3 % (ref 20–50)
HGB BLD-MCNC: 13.4 G/DL (ref 12–16)
LDLC SERPL CALC-MCNC: 110.2 MG/DL (ref 63–159)
MCH RBC QN AUTO: 23.2 PG (ref 27–31)
MCHC RBC AUTO-ENTMCNC: 30.2 G/DL (ref 32–36)
MCV RBC AUTO: 77 FL (ref 82–98)
NONHDLC SERPL-MCNC: 130 MG/DL
PLATELET # BLD AUTO: 233 K/UL (ref 150–450)
PMV BLD AUTO: 10.2 FL (ref 9.2–12.9)
POTASSIUM SERPL-SCNC: 3.9 MMOL/L (ref 3.5–5.1)
PROT SERPL-MCNC: 8.3 G/DL (ref 6–8.4)
RBC # BLD AUTO: 5.77 M/UL (ref 4–5.4)
SODIUM SERPL-SCNC: 139 MMOL/L (ref 136–145)
TRIGL SERPL-MCNC: 99 MG/DL (ref 30–150)
TSH SERPL DL<=0.005 MIU/L-ACNC: 1.53 UIU/ML (ref 0.4–4)
WBC # BLD AUTO: 4.08 K/UL (ref 3.9–12.7)

## 2023-11-01 PROCEDURE — 85027 COMPLETE CBC AUTOMATED: CPT | Mod: PO | Performed by: STUDENT IN AN ORGANIZED HEALTH CARE EDUCATION/TRAINING PROGRAM

## 2023-11-01 PROCEDURE — 83036 HEMOGLOBIN GLYCOSYLATED A1C: CPT | Performed by: STUDENT IN AN ORGANIZED HEALTH CARE EDUCATION/TRAINING PROGRAM

## 2023-11-01 PROCEDURE — 80053 COMPREHEN METABOLIC PANEL: CPT | Mod: PO | Performed by: STUDENT IN AN ORGANIZED HEALTH CARE EDUCATION/TRAINING PROGRAM

## 2023-11-01 PROCEDURE — 84443 ASSAY THYROID STIM HORMONE: CPT | Mod: PO | Performed by: STUDENT IN AN ORGANIZED HEALTH CARE EDUCATION/TRAINING PROGRAM

## 2023-11-01 PROCEDURE — 36415 COLL VENOUS BLD VENIPUNCTURE: CPT | Mod: PO | Performed by: STUDENT IN AN ORGANIZED HEALTH CARE EDUCATION/TRAINING PROGRAM

## 2023-11-01 PROCEDURE — 80061 LIPID PANEL: CPT | Performed by: STUDENT IN AN ORGANIZED HEALTH CARE EDUCATION/TRAINING PROGRAM

## 2023-11-02 ENCOUNTER — TELEPHONE (OUTPATIENT)
Dept: GASTROENTEROLOGY | Facility: CLINIC | Age: 63
End: 2023-11-02
Payer: COMMERCIAL

## 2023-11-02 NOTE — TELEPHONE ENCOUNTER
Called and spoke with pt regarding message received about pt calling back to speak with the nurse that called her yesterday. Informed pt one of the pre op nurses must of called her for her nursing interview. Informed pt I will let the pre op nurse know to call her back. Pt verbalized understanding.       ----- Message from Srinivas Barajas sent at 11/2/2023  9:41 AM CDT -----  Contact: pt  Type:  Patient Returning Call    Who Called:pt   Who Left Message for Patient:nurse   Does the patient know what this is regarding?:Procedure   Would the patient rather a call back or a response via [facility]? call  Best Call Back Number:[phone]  Additional Information:

## 2023-11-08 ENCOUNTER — TELEPHONE (OUTPATIENT)
Dept: FAMILY MEDICINE | Facility: CLINIC | Age: 63
End: 2023-11-08
Payer: COMMERCIAL

## 2023-11-08 NOTE — TELEPHONE ENCOUNTER
Patient canceled her apt for today today , due to traffic on [address]. Pt rescheduled her apt to 11/14/2023 for 12:00 pm for a virtual visit. Are you ok with patient doing that? Thanks

## 2023-11-08 NOTE — TELEPHONE ENCOUNTER
Called and spoke with patient in regards of her message. Pt called to reschedule her apt for today. Pt rescheduled her apt to 11/14/2023 for 12:00 pm for her Follow Up.

## 2023-11-08 NOTE — TELEPHONE ENCOUNTER
----- Message from Lyly Price sent at 11/8/2023  3:25 PM CST -----  Type:  Needs Medical Advice    Who Called: pt  Would the patient rather a call back or a response via [facility]? call  Best Call Back Number:  [phone]  Additional Information: pt would like a call from office regarding her schedule appointment @ 3:20 pt is running late  please call or a audio appointment

## 2023-11-14 ENCOUNTER — TELEPHONE (OUTPATIENT)
Dept: GASTROENTEROLOGY | Facility: CLINIC | Age: 63
End: 2023-11-14
Payer: COMMERCIAL

## 2023-11-14 ENCOUNTER — TELEPHONE (OUTPATIENT)
Dept: FAMILY MEDICINE | Facility: CLINIC | Age: 63
End: 2023-11-14
Payer: COMMERCIAL

## 2023-11-14 NOTE — TELEPHONE ENCOUNTER
LM for pt to call office back at [phone] to discuss EGD results.          ----- Message from Lilly Lagos MD sent at 11/13/2023  3:56 PM CST -----  HP (-), cont PPI, RTC as needed

## 2023-11-14 NOTE — TELEPHONE ENCOUNTER
----- Message from Sharifa Chaudhary sent at 11/14/2023 12:52 PM CST -----  Type:  Needs Medical Advice      Would the patient rather a call back or a response via [facility]?  call  Best Call Back Number:  [phone]  Additional Information: pt was scheduled for a virtual at 12:00 on 11/14/23 pt stated that she would like to get a return call pt stated that the pre check was to long

## 2023-12-30 NOTE — TELEPHONE ENCOUNTER
at 40 weeks gestation delivered vaginally a viable female at 2248 with placenta to follow intact at 2255. Patient had epidural for pain control which kept her comfortable until patient was 10cm dilated and then began to have vaginal pressure. Patient pushed with legs in stir ups/leg rests with support of significant other.  was placed on mother's abdomen for delayed cord clamping. Umbilical cord cut and clamped and  placed skin to skin with mother. Routine postpartum orders, plans to breastfeed.   #30, one daily 3 refills   Dr. Mensah

## 2024-01-06 NOTE — OR NURSING
Pt sleeping in between care. All abd dsg cdi. No vaginal bleeding. Med with dilaudid x 2. Sleeping ,snoring,. Awaiting to give report to floor nse accepting pt.   Airway patent, Nasal mucosa clear. Mouth with normal mucosa. Throat has no vesicles, no oropharyngeal exudates and uvula is midline.

## 2024-04-15 ENCOUNTER — PATIENT MESSAGE (OUTPATIENT)
Dept: GASTROENTEROLOGY | Facility: CLINIC | Age: 64
End: 2024-04-15
Payer: COMMERCIAL

## 2024-05-08 ENCOUNTER — TELEPHONE (OUTPATIENT)
Dept: HEMATOLOGY/ONCOLOGY | Facility: CLINIC | Age: 64
End: 2024-05-08
Payer: COMMERCIAL

## 2024-05-08 DIAGNOSIS — Z80.3 FAMILY HISTORY OF BREAST CANCER: ICD-10-CM

## 2024-05-08 DIAGNOSIS — Z91.89 INCREASED RISK OF BREAST CANCER: Primary | ICD-10-CM

## 2024-05-08 DIAGNOSIS — R92.30 DENSE BREAST TISSUE: ICD-10-CM

## 2024-05-08 NOTE — TELEPHONE ENCOUNTER
Spoke with pt.   Only wanted mammo order, advised that she is due for follow up visit. Stated her insurance dont pay for all of these visits- since she only sees us for a physical breast exam she prefers to have her OBGYN assume care.   Advised pt to have OBGYN place mammo order.       ----- Message from Sara Butcher sent at 5/8/2024  1:00 PM CDT -----  Contact: [phone]  What orders is pt asking for?: annual mammogram    Is there a future appointment with the provider?: mp    When?:    Comments?:

## 2024-05-23 ENCOUNTER — OFFICE VISIT (OUTPATIENT)
Dept: INTERNAL MEDICINE | Facility: CLINIC | Age: 64
End: 2024-05-23
Payer: COMMERCIAL

## 2024-05-23 ENCOUNTER — LAB VISIT (OUTPATIENT)
Dept: LAB | Facility: HOSPITAL | Age: 64
End: 2024-05-23
Payer: COMMERCIAL

## 2024-05-23 VITALS
HEART RATE: 72 BPM | TEMPERATURE: 99 F | OXYGEN SATURATION: 95 % | HEIGHT: 69 IN | SYSTOLIC BLOOD PRESSURE: 114 MMHG | RESPIRATION RATE: 16 BRPM | BODY MASS INDEX: 28.28 KG/M2 | WEIGHT: 190.94 LBS | DIASTOLIC BLOOD PRESSURE: 70 MMHG

## 2024-05-23 DIAGNOSIS — Z12.39 ENCOUNTER FOR OTHER SCREENING FOR MALIGNANT NEOPLASM OF BREAST: ICD-10-CM

## 2024-05-23 DIAGNOSIS — Z00.00 ENCOUNTER FOR ANNUAL GENERAL MEDICAL EXAMINATION WITHOUT ABNORMAL FINDINGS IN ADULT: Primary | ICD-10-CM

## 2024-05-23 DIAGNOSIS — Z00.00 ENCOUNTER FOR ANNUAL GENERAL MEDICAL EXAMINATION WITHOUT ABNORMAL FINDINGS IN ADULT: ICD-10-CM

## 2024-05-23 DIAGNOSIS — I10 ESSENTIAL HYPERTENSION: Chronic | ICD-10-CM

## 2024-05-23 DIAGNOSIS — K21.9 GASTROESOPHAGEAL REFLUX DISEASE WITHOUT ESOPHAGITIS: ICD-10-CM

## 2024-05-23 DIAGNOSIS — E55.9 VITAMIN D DEFICIENCY: ICD-10-CM

## 2024-05-23 DIAGNOSIS — Z76.89 ENCOUNTER TO ESTABLISH CARE WITH NEW DOCTOR: ICD-10-CM

## 2024-05-23 DIAGNOSIS — Z87.892 HISTORY OF ANAPHYLAXIS: ICD-10-CM

## 2024-05-23 DIAGNOSIS — K76.0 FATTY LIVER: ICD-10-CM

## 2024-05-23 DIAGNOSIS — G47.33 OSA (OBSTRUCTIVE SLEEP APNEA): ICD-10-CM

## 2024-05-23 PROBLEM — L50.1 URTICARIA, IDIOPATHIC: Status: RESOLVED | Noted: 2024-05-23 | Resolved: 2024-05-23

## 2024-05-23 PROBLEM — K44.9 DIAPHRAGMATIC HERNIA WITHOUT OBSTRUCTION OR GANGRENE: Status: ACTIVE | Noted: 2021-01-13

## 2024-05-23 PROBLEM — J31.0 CHRONIC RHINITIS: Status: ACTIVE | Noted: 2024-05-23

## 2024-05-23 PROBLEM — D64.9 ANEMIA: Status: RESOLVED | Noted: 2024-05-23 | Resolved: 2024-05-23

## 2024-05-23 PROBLEM — T78.3XXA ANGIOEDEMA: Status: RESOLVED | Noted: 2024-05-23 | Resolved: 2024-05-23

## 2024-05-23 LAB
25(OH)D3+25(OH)D2 SERPL-MCNC: 18 NG/ML (ref 30–96)
25(OH)D3+25(OH)D2 SERPL-MCNC: 18 NG/ML (ref 30–96)
ALBUMIN SERPL BCP-MCNC: 4.1 G/DL (ref 3.5–5.2)
ALP SERPL-CCNC: 94 U/L (ref 55–135)
ALT SERPL W/O P-5'-P-CCNC: 56 U/L (ref 10–44)
ANION GAP SERPL CALC-SCNC: 12 MMOL/L (ref 8–16)
AST SERPL-CCNC: 38 U/L (ref 10–40)
BASOPHILS # BLD AUTO: 0.03 K/UL (ref 0–0.2)
BASOPHILS NFR BLD: 0.7 % (ref 0–1.9)
BILIRUB SERPL-MCNC: 0.8 MG/DL (ref 0.1–1)
BUN SERPL-MCNC: 11 MG/DL (ref 8–23)
CALCIUM SERPL-MCNC: 9.9 MG/DL (ref 8.7–10.5)
CHLORIDE SERPL-SCNC: 106 MMOL/L (ref 95–110)
CHOLEST SERPL-MCNC: 177 MG/DL (ref 120–199)
CHOLEST/HDLC SERPL: 3.7 {RATIO} (ref 2–5)
CO2 SERPL-SCNC: 25 MMOL/L (ref 23–29)
CREAT SERPL-MCNC: 0.8 MG/DL (ref 0.5–1.4)
DIFFERENTIAL METHOD BLD: ABNORMAL
EOSINOPHIL # BLD AUTO: 0.1 K/UL (ref 0–0.5)
EOSINOPHIL NFR BLD: 2.4 % (ref 0–8)
ERYTHROCYTE [DISTWIDTH] IN BLOOD BY AUTOMATED COUNT: 13.9 % (ref 11.5–14.5)
EST. GFR  (NO RACE VARIABLE): >60 ML/MIN/1.73 M^2
ESTIMATED AVG GLUCOSE: 111 MG/DL (ref 68–131)
GLUCOSE SERPL-MCNC: 92 MG/DL (ref 70–110)
HBA1C MFR BLD: 5.5 % (ref 4–5.6)
HCT VFR BLD AUTO: 42.8 % (ref 37–48.5)
HDLC SERPL-MCNC: 48 MG/DL (ref 40–75)
HDLC SERPL: 27.1 % (ref 20–50)
HGB BLD-MCNC: 12.8 G/DL (ref 12–16)
HIV 1+2 AB+HIV1 P24 AG SERPL QL IA: NORMAL
IMM GRANULOCYTES # BLD AUTO: 0.01 K/UL (ref 0–0.04)
IMM GRANULOCYTES NFR BLD AUTO: 0.2 % (ref 0–0.5)
LDLC SERPL CALC-MCNC: 109 MG/DL (ref 63–159)
LYMPHOCYTES # BLD AUTO: 2.1 K/UL (ref 1–4.8)
LYMPHOCYTES NFR BLD: 52.1 % (ref 18–48)
MCH RBC QN AUTO: 23.3 PG (ref 27–31)
MCHC RBC AUTO-ENTMCNC: 29.9 G/DL (ref 32–36)
MCV RBC AUTO: 78 FL (ref 82–98)
MONOCYTES # BLD AUTO: 0.3 K/UL (ref 0.3–1)
MONOCYTES NFR BLD: 7.3 % (ref 4–15)
NEUTROPHILS # BLD AUTO: 1.5 K/UL (ref 1.8–7.7)
NEUTROPHILS NFR BLD: 37.3 % (ref 38–73)
NONHDLC SERPL-MCNC: 129 MG/DL
NRBC BLD-RTO: 0 /100 WBC
PLATELET # BLD AUTO: 240 K/UL (ref 150–450)
PMV BLD AUTO: 11.7 FL (ref 9.2–12.9)
POTASSIUM SERPL-SCNC: 3.8 MMOL/L (ref 3.5–5.1)
PROT SERPL-MCNC: 7.4 G/DL (ref 6–8.4)
RBC # BLD AUTO: 5.5 M/UL (ref 4–5.4)
SODIUM SERPL-SCNC: 143 MMOL/L (ref 136–145)
T4 FREE SERPL-MCNC: 0.87 NG/DL (ref 0.71–1.51)
TRIGL SERPL-MCNC: 100 MG/DL (ref 30–150)
TSH SERPL DL<=0.005 MIU/L-ACNC: 1.25 UIU/ML (ref 0.4–4)
WBC # BLD AUTO: 4.09 K/UL (ref 3.9–12.7)

## 2024-05-23 PROCEDURE — 84443 ASSAY THYROID STIM HORMONE: CPT | Performed by: FAMILY MEDICINE

## 2024-05-23 PROCEDURE — 99396 PREV VISIT EST AGE 40-64: CPT | Mod: S$GLB,,, | Performed by: FAMILY MEDICINE

## 2024-05-23 PROCEDURE — 85025 COMPLETE CBC W/AUTO DIFF WBC: CPT | Performed by: FAMILY MEDICINE

## 2024-05-23 PROCEDURE — 82306 VITAMIN D 25 HYDROXY: CPT | Performed by: STUDENT IN AN ORGANIZED HEALTH CARE EDUCATION/TRAINING PROGRAM

## 2024-05-23 PROCEDURE — 80053 COMPREHEN METABOLIC PANEL: CPT | Performed by: FAMILY MEDICINE

## 2024-05-23 PROCEDURE — 99999 PR PBB SHADOW E&M-EST. PATIENT-LVL V: CPT | Mod: PBBFAC,,, | Performed by: FAMILY MEDICINE

## 2024-05-23 PROCEDURE — 84439 ASSAY OF FREE THYROXINE: CPT | Performed by: FAMILY MEDICINE

## 2024-05-23 PROCEDURE — 36415 COLL VENOUS BLD VENIPUNCTURE: CPT | Performed by: STUDENT IN AN ORGANIZED HEALTH CARE EDUCATION/TRAINING PROGRAM

## 2024-05-23 PROCEDURE — 83036 HEMOGLOBIN GLYCOSYLATED A1C: CPT | Performed by: FAMILY MEDICINE

## 2024-05-23 PROCEDURE — 87389 HIV-1 AG W/HIV-1&-2 AB AG IA: CPT | Performed by: FAMILY MEDICINE

## 2024-05-23 PROCEDURE — 80061 LIPID PANEL: CPT | Performed by: FAMILY MEDICINE

## 2024-05-23 RX ORDER — EPINEPHRINE 0.3 MG/.3ML
1 INJECTION SUBCUTANEOUS ONCE
Qty: 0.3 ML | Refills: 0 | Status: SHIPPED | OUTPATIENT
Start: 2024-05-23 | End: 2024-05-23

## 2024-05-23 RX ORDER — HYDROCHLOROTHIAZIDE 12.5 MG/1
12.5 CAPSULE ORAL DAILY
COMMUNITY

## 2024-05-23 RX ORDER — ALBUTEROL SULFATE 90 UG/1
2 AEROSOL, METERED RESPIRATORY (INHALATION) EVERY 4 HOURS PRN
COMMUNITY

## 2024-05-23 RX ORDER — OMEPRAZOLE 40 MG/1
40 CAPSULE, DELAYED RELEASE ORAL DAILY
Start: 2024-05-23 | End: 2025-05-23

## 2024-05-23 RX ORDER — AMLODIPINE BESYLATE 5 MG/1
5 TABLET ORAL DAILY
COMMUNITY

## 2024-05-23 RX ORDER — PANTOPRAZOLE SODIUM 40 MG/1
40 TABLET, DELAYED RELEASE ORAL DAILY
COMMUNITY
End: 2024-05-23

## 2024-05-23 NOTE — PROGRESS NOTES
Subjective:     Patient ID: Leschia T Bastian is a 63 y.o. female.   Chief Complaint: Annual Exam    HPI:  [facility].  Patient is due for annual exam and labs.    Sees GI for endoscopy, lost to follow-up. Not fully managed w/ omeprazole. Noted to have sphincter weakness. Has constant reflux sx.     Sinus congestion started yesterday, mild cough.  Denies fever, shortness of breath.  Says that she gets sinus infections once or twice a year, usually treated with a shot from an urgent care.  She says these symptoms are similar to what she has experienced before.  Denies any sinus pressure or pain.  Minimal rhinorrhea.  No nausea, vomiting, diarrhea, or other GI distress.    Requesting refill of EpiPen, omeprazole.  Reports that she is low on her vitamin-D prescription, taking 82269 units weekly.    Reports she is not taking albuterol, amlodipine, Bentyl, HCTZ, Linzess, Protonix, Zanaflex, trazodone.    Reports her prescription of valsartan HCTZ is up-to-date.      Review of Problems & History:  Patient Active Problem List   Diagnosis    Essential hypertension    GERD (gastroesophageal reflux disease)    Vitamin D deficiency    Increased risk of breast cancer    Overweight (BMI 25.0-29.9)    Fatty liver    Kidney cyst, acquired    JAYLON (obstructive sleep apnea)    Primary insomnia    Spasm of right side abdominal muscles    Hair loss disorder    Family history of colon cancer    Personal history of colonic polyps    Chronic idiopathic constipation    Chronic rhinitis    Diaphragmatic hernia without obstruction or gangrene      Past Medical History:   Diagnosis Date    Acute on chronic cholecystitis 03/19/2014    Formatting of this note might be different from the original.   dx update   IMO Update  dx update   IMO April 2016      Anemia 05/23/2024    Angioedema 05/23/2024    Atypical chest pain     Cervical radiculopathy     history of, no current problems    Constipation     COVID-19      General anesthetics causing adverse effect in therapeutic use     GERD (gastroesophageal reflux disease)     Hypertension     Insomnia     Kidney cyst, acquired 01/28/2021    Lumbar radiculopathy     JAYLON on CPAP     S/P ROLDAN-BSO 06/25/2018    Seasonal allergies     Shortness of breath     Urticaria, idiopathic 05/23/2024    Vitamin D deficiency       Past Surgical History:   Procedure Laterality Date    AXILLARY HIDRADENITIS EXCISION Bilateral     BUNIONECTOMY Bilateral     COLONOSCOPY      several    CYSTOSCOPY  06/22/2018    Procedure: CYSTOSCOPY;  Surgeon: Best Jefferson MD;  Location: [facility];  Service: Urology;;    ESOPHAGOGASTRODUODENOSCOPY      x2    ESOPHAGOGASTRODUODENOSCOPY N/A 11/9/2023    Procedure: EGD (ESOPHAGOGASTRODUODENOSCOPY);  Surgeon: Lilly Lagos MD;  Location: [facility];  Service: Endoscopy;  Laterality: N/A;    GALLBLADDER SURGERY      LAPAROSCOPIC TOTAL HYSTERECTOMY N/A 06/22/2018    Procedure: HYSTERECTOMY, TOTAL, LAPAROSCOPIC, ATTEMPTED;  Surgeon: Nidia Bradley MD;  Location: [facility];  Service: OB/GYN;  Laterality: N/A;  attempted    LYSIS OF ADHESIONS OF URETER Right 06/22/2018    Procedure: URETEROLYSIS;  Surgeon: Best Jefferson MD;  Location: [facility];  Service: Urology;  Laterality: Right;    TOTAL ABDOMINAL HYSTERECTOMY W/ BILATERAL SALPINGOOPHORECTOMY Bilateral 06/22/2018    TUBAL LIGATION      WISDOM TOOTH EXTRACTION        Social History     Socioeconomic History    Marital status:     Number of children: 2   Tobacco Use    Smoking status: Never    Smokeless tobacco: Never   Substance and Sexual Activity    Alcohol use: Yes     Comment: occasional daiquiri    Drug use: Never    Sexual activity: Yes     Partners: Male   Social History Narrative    Patient is originally from [place] ; [employer] ; [place]         Working ;  at [employer]         //e            Children    vionne -girl     Tanner - boy         Lives with     Mother, father            Diet/Exericse         Social Determinants of Health     Financial Resource Strain: Patient Declined (11/14/2023)    Overall Financial Resource Strain (CARDIA)     Difficulty of Paying Living Expenses: Patient declined   Food Insecurity: Patient Declined (11/14/2023)    Hunger Vital Sign     Worried About Running Out of Food in the Last Year: Patient declined     Ran Out of Food in the Last Year: Patient declined   Transportation Needs: Unknown (11/14/2023)    PRAPARE - Transportation     Lack of Transportation (Medical): Patient declined   Physical Activity: Unknown (11/14/2023)    Exercise Vital Sign     Days of Exercise per Week: Patient declined   Stress: Patient Declined (11/14/2023)    [employer] - Occupational Stress Questionnaire     Feeling of Stress : Patient declined   Housing Stability: Unknown (11/14/2023)    Housing Stability Vital Sign     Unable to Pay for Housing in the Last Year: Patient refused     Unstable Housing in the Last Year: Patient refused       Medication Review:    Current Outpatient Medications:     BIOTIN ORAL, Take 1 capsule by mouth Daily., Disp: , Rfl:     clobetasoL (CLOBEX) 0.05 % shampoo, Apply 1 application  topically once a week., Disp: , Rfl:     ergocalciferol (ERGOCALCIFEROL) 50,000 unit Cap, Take 1 capsule (50,000 Units total) by mouth every 7 days., Disp: 12 capsule, Rfl: 3    ketoconazole (NIZORAL) 2 % cream, Apply 1 application  topically as needed., Disp: , Rfl:     linaCLOtide (LINZESS) 72 mcg Cap capsule, Take 1 capsule (72 mcg total) by mouth once daily. (Patient taking differently: Take 72 mcg by mouth once daily. Not currently taking; will start after procedure), Disp: 30 capsule, Rfl: 11    valsartan-hydrochlorothiazide (DIOVAN-HCT) 160-12.5 mg per tablet, Take 1 tablet by mouth once daily., Disp: 90 tablet, Rfl: 3    albuterol (VENTOLIN HFA) 90 mcg/actuation inhaler, Inhale 2 puffs into the  lungs every 4 (four) hours as needed for Wheezing or Shortness of Breath. (Patient not taking: Reported on 5/23/2024), Disp: , Rfl:     amLODIPine (NORVASC) 5 MG tablet, Take 5 mg by mouth once daily. (Patient not taking: Reported on 5/23/2024), Disp: , Rfl:     ascorbic acid (VITAMIN C ORAL), Take 1 tablet by mouth Daily. (Patient not taking: Reported on 5/23/2024), Disp: , Rfl:     b.noel-tea-ginsg-paulette-hops-thean (BLACK COHOSH MENOPAUSE COMPLEX) -50(d)/20 -200-100 mg (n) TbSQ, Take 1 tablet by mouth every evening. (Patient not taking: Reported on 5/23/2024), Disp: 90 tablet, Rfl: 3    dicyclomine (BENTYL) 10 MG capsule, Take 10 mg by mouth 2 (two) times daily., Disp: , Rfl:     EPINEPHrine (EPIPEN) 0.3 mg/0.3 mL AtIn, Inject 0.3 mLs (0.3 mg total) into the muscle once. for 1 dose, Disp: 0.3 mL, Rfl: 0    hydroCHLOROthiazide (MICROZIDE) 12.5 mg capsule, Take 12.5 mg by mouth once daily. (Patient not taking: Reported on 5/23/2024), Disp: , Rfl:     NON FORMULARY MEDICATION, Take 1 tablet by mouth Daily. marvin, Disp: , Rfl:     omeprazole (PRILOSEC) 40 MG capsule, Take 1 capsule (40 mg total) by mouth once daily., Disp: , Rfl:     tiZANidine (ZANAFLEX) 4 MG tablet, Take 1 tablet (4 mg total) by mouth nightly as needed (muscle spasms). (Patient not taking: Reported on 5/23/2024), Disp: 40 tablet, Rfl: 1    traZODone (DESYREL) 50 MG tablet, Take 1 tab nightly; may increase by 1 tab every 3 days to a maximum of 3 tabs for insomnia., Disp: 90 tablet, Rfl: 1  No current facility-administered medications for this visit.    Facility-Administered Medications Ordered in Other Visits:     0.9%  NaCl infusion, , Intravenous, Continuous, Lilly Lagos MD, [facility] at 11/09/23 1404    sodium chloride 0.9% flush 10 mL, 10 mL, Intravenous, PRN, Lilly Lagos MD     Review of Systems   Constitutional:  Negative for chills, fatigue and fever.   HENT:  Positive for sinus pressure/congestion. Negative for nasal  "congestion, ear pain, postnasal drip, rhinorrhea, sneezing and sore throat.    Eyes:  Negative for visual disturbance.   Respiratory:  Positive for cough. Negative for shortness of breath and wheezing.    Cardiovascular:  Negative for chest pain and palpitations.   Gastrointestinal:  Negative for abdominal pain, change in bowel habit, constipation, diarrhea, nausea and vomiting.   Genitourinary:  Negative for dysuria and hematuria.   Musculoskeletal:  Negative for back pain, leg pain and neck pain.   Neurological:  Negative for dizziness, numbness and headaches.   Hematological:  Negative for adenopathy.   Psychiatric/Behavioral:  Negative for dysphoric mood, sleep disturbance and suicidal ideas. The patient is not nervous/anxious.           Objective:      Vitals:    05/23/24 1204   BP: 114/70   Pulse: 72   Resp: 16   Temp: 98.6 °F (37 °C)   TempSrc: Oral   SpO2: 95%   Weight: 86.6 kg (190 lb 14.7 oz)   Height: 5' 9" (1.753 m)      Physical Exam  Vitals and nursing note reviewed.   Constitutional:       General: She is not in acute distress.     Appearance: Normal appearance. She is not ill-appearing.   HENT:      Head: Normocephalic and atraumatic.      Right Ear: Tympanic membrane, ear canal and external ear normal. There is no impacted cerumen.      Left Ear: Tympanic membrane, ear canal and external ear normal. There is no impacted cerumen.      Nose: Nose normal. No congestion or rhinorrhea.      Right Sinus: No maxillary sinus tenderness or frontal sinus tenderness.      Left Sinus: No maxillary sinus tenderness or frontal sinus tenderness.      Mouth/Throat:      Mouth: Mucous membranes are moist.      Pharynx: Oropharynx is clear. No pharyngeal swelling, oropharyngeal exudate, posterior oropharyngeal erythema or uvula swelling.      Tonsils: 0 on the right. 0 on the left.      Comments: Mild/early postnasal drip visualized  Eyes:      General: No scleral icterus.        Right eye: No discharge.         Left " eye: No discharge.      Conjunctiva/sclera: Conjunctivae normal.   Neck:      Thyroid: No thyroid mass, thyromegaly or thyroid tenderness.   Cardiovascular:      Rate and Rhythm: Normal rate and regular rhythm.      Pulses: Normal pulses.      Heart sounds: Normal heart sounds. No murmur heard.     No friction rub. No gallop.   Pulmonary:      Effort: Pulmonary effort is normal. No respiratory distress.      Breath sounds: Normal breath sounds. No wheezing, rhonchi or rales.   Abdominal:      General: Abdomen is flat. Bowel sounds are normal. There is no distension.      Palpations: Abdomen is soft. There is no mass.      Tenderness: There is no abdominal tenderness. There is no guarding.   Musculoskeletal:         General: No swelling or deformity. Normal range of motion.      Cervical back: Normal range of motion and neck supple. No tenderness.   Lymphadenopathy:      Cervical: No cervical adenopathy.   Skin:     General: Skin is warm and dry.   Neurological:      General: No focal deficit present.      Mental Status: She is alert and oriented to person, place, and time. Mental status is at baseline.      Gait: Gait normal.      Deep Tendon Reflexes: Reflexes normal.   Psychiatric:         Mood and Affect: Mood normal.         Behavior: Behavior normal.         Thought Content: Thought content normal.         Judgment: Judgment normal.           Assessment:       Problem List Items Addressed This Visit          Cardiac/Vascular    Essential hypertension (Chronic)       Endocrine    Vitamin D deficiency    Relevant Orders    Vitamin D       GI    GERD (gastroesophageal reflux disease)    Relevant Medications    omeprazole (PRILOSEC) 40 MG capsule    Other Relevant Orders    Ambulatory referral/consult to Gastroenterology    Fatty liver       Other    JAYLON (obstructive sleep apnea)    Relevant Orders    Ambulatory referral/consult to Sleep Disorders     Other Visit Diagnoses       Encounter for annual general medical  examination without abnormal findings in adult    -  Primary    Relevant Orders    Comprehensive Metabolic Panel    CBC Auto Differential    Lipid Panel    Hemoglobin A1C    TSH    T4, Free    HIV 1/2 Ag/Ab (4th Gen)    Encounter to establish care with new doctor        Encounter for other screening for malignant neoplasm of breast        Relevant Orders    Mammo Digital Screening Bilat    History of anaphylaxis        Relevant Medications    EPINEPHrine (EPIPEN) 0.3 mg/0.3 mL AtIn              Plan:       1. Encounter for annual general medical examination without abnormal findings in adult  Health maintenance updated  Chronic issues reviewed  Fasting labs ordered  - Comprehensive Metabolic Panel; Future  - CBC Auto Differential; Future  - Lipid Panel; Future  - Hemoglobin A1C; Future  - TSH; Future  - T4, Free; Future  - HIV 1/2 Ag/Ab (4th Gen); Future    2. Encounter to establish care with new doctor  Reviewed chronic medical conditions, updated problem list and medication list    3. Fatty liver  Noted on history, will check LFTs with CMP    4. Vitamin D deficiency  Noted on history, patient reports she is still taking high-dose supplementation   Checking levels, plan to refill if indicated  - Vitamin D; Future    5. Essential hypertension  Stable, well-controlled   Continue valsartan HCTZ as prescribed, no change in dose today, no refill needed    6. Encounter for other screening for malignant neoplasm of breast  Mammogram ordered  - Mammo Digital Screening Bilat; Future    7. Gastroesophageal reflux disease without esophagitis  Refilled Prilosec today, per patient request   Reintroducing referral to GI  Patient needs to reestablish with specialist for detailed workup considering she is having continued symptoms despite long-term treatment with PPI  - omeprazole (PRILOSEC) 40 MG capsule; Take 1 capsule (40 mg total) by mouth once daily.  - Ambulatory referral/consult to Gastroenterology; Future    8. History of  anaphylaxis  Review patient's allergies   Refill EpiPen  - EPINEPHrine (EPIPEN) 0.3 mg/0.3 mL AtIn; Inject 0.3 mLs (0.3 mg total) into the muscle once. for 1 dose  Dispense: 0.3 mL; Refill: 0    9. JAYLON (obstructive sleep apnea)  Patient needs to reestablish with sleep medicine for recalibration of her CPAP   Referral entered  - Ambulatory referral/consult to Sleep Disorders; Future          Follow up in about 1 year (around 5/23/2025) for Annual Visit, Fasting labs.     Rico Paredes MD, FAAFP  Family Medicine Physician  [facility]  05/23/2024

## 2024-05-24 DIAGNOSIS — E55.9 VITAMIN D DEFICIENCY: ICD-10-CM

## 2024-05-24 RX ORDER — ERGOCALCIFEROL 1.25 MG/1
50000 CAPSULE ORAL
Qty: 12 CAPSULE | Refills: 3 | Status: SHIPPED | OUTPATIENT
Start: 2024-05-24 | End: 2024-05-28 | Stop reason: SDUPTHER

## 2024-05-28 DIAGNOSIS — E55.9 VITAMIN D DEFICIENCY: ICD-10-CM

## 2024-05-28 RX ORDER — ERGOCALCIFEROL 1.25 MG/1
50000 CAPSULE ORAL
Qty: 12 CAPSULE | Refills: 3 | Status: SHIPPED | OUTPATIENT
Start: 2024-05-28

## 2024-06-01 ENCOUNTER — HOSPITAL ENCOUNTER (OUTPATIENT)
Dept: RADIOLOGY | Facility: HOSPITAL | Age: 64
Discharge: HOME | End: 2024-06-01
Attending: FAMILY MEDICINE
Payer: COMMERCIAL

## 2024-06-01 VITALS — BODY MASS INDEX: 28.14 KG/M2 | HEIGHT: 69 IN | WEIGHT: 190 LBS

## 2024-06-01 DIAGNOSIS — Z12.39 ENCOUNTER FOR OTHER SCREENING FOR MALIGNANT NEOPLASM OF BREAST: ICD-10-CM

## 2024-06-01 PROCEDURE — 77067 SCR MAMMO BI INCL CAD: CPT | Mod: TC

## 2024-06-01 PROCEDURE — 77063 BREAST TOMOSYNTHESIS BI: CPT | Mod: TC

## 2024-06-01 PROCEDURE — 77067 SCR MAMMO BI INCL CAD: CPT | Mod: 26,,, | Performed by: RADIOLOGY

## 2024-06-01 PROCEDURE — 77063 BREAST TOMOSYNTHESIS BI: CPT | Mod: 26,,, | Performed by: RADIOLOGY

## 2024-06-10 ENCOUNTER — PATIENT MESSAGE (OUTPATIENT)
Dept: INTERNAL MEDICINE | Facility: CLINIC | Age: 64
End: 2024-06-10
Payer: COMMERCIAL

## 2024-06-21 ENCOUNTER — OFFICE VISIT (OUTPATIENT)
Dept: INTERNAL MEDICINE | Facility: CLINIC | Age: 64
End: 2024-06-21
Payer: COMMERCIAL

## 2024-06-21 VITALS
OXYGEN SATURATION: 97 % | RESPIRATION RATE: 18 BRPM | SYSTOLIC BLOOD PRESSURE: 100 MMHG | DIASTOLIC BLOOD PRESSURE: 74 MMHG | BODY MASS INDEX: 28.54 KG/M2 | WEIGHT: 192.69 LBS | HEART RATE: 89 BPM | TEMPERATURE: 98 F | HEIGHT: 69 IN

## 2024-06-21 DIAGNOSIS — T78.2XXA ANAPHYLAXIS, INITIAL ENCOUNTER: ICD-10-CM

## 2024-06-21 DIAGNOSIS — M54.50 ACUTE LOW BACK PAIN, UNSPECIFIED BACK PAIN LATERALITY, UNSPECIFIED WHETHER SCIATICA PRESENT: Primary | ICD-10-CM

## 2024-06-21 DIAGNOSIS — J31.0 CHRONIC RHINITIS: ICD-10-CM

## 2024-06-21 PROCEDURE — 99999 PR PBB SHADOW E&M-EST. PATIENT-LVL V: CPT | Mod: PBBFAC,,, | Performed by: NURSE PRACTITIONER

## 2024-06-21 RX ORDER — METHOCARBAMOL 500 MG/1
500 TABLET, FILM COATED ORAL 3 TIMES DAILY PRN
Qty: 30 TABLET | Refills: 0 | Status: SHIPPED | OUTPATIENT
Start: 2024-06-21

## 2024-06-21 NOTE — PROGRESS NOTES
"Subjective     Patient ID: Leschia T Bastian is a 63 y.o. female.    Chief Complaint: Back Pain    Pt of Dr. Paredes, here for lower back pain. Started "months ago" in right upper buttocks, lower back. Denies radiating down leg, reports some radiating up into hip and pelvic area. She has had sciatica in the past but it presented differently. Saw someone back in 2008 and had injections for it. Denies any injury, trauma, or falls. Now she mainly has pain over the right buttocks and mid back, no radiation down the leg. Tried Tylenol without relief.    Recently established care with Dr. Paredes in May, forgot to mention the above issue with him. When she saw him she told him she has a history of anaphylaxis which she asked for an epipen script which was sent but not covered. Was told years ago about another provider that she has allergies to roaches. But she has found with certain seasonings she has flare ups. Will resend script with more definitive dx and also consider allergist referral for further workup.      Review of Systems   Constitutional:  Negative for activity change, appetite change, chills, diaphoresis, fatigue, fever and unexpected weight change.   Respiratory:  Negative for chest tightness and shortness of breath.    Cardiovascular:  Negative for chest pain and leg swelling.   Gastrointestinal:  Negative for abdominal pain, blood in stool, constipation, diarrhea, nausea, vomiting and fecal incontinence.   Genitourinary:  Negative for dysuria.   Musculoskeletal:  Positive for back pain. Negative for arthralgias, gait problem, joint swelling, leg pain, myalgias, neck pain and joint deformity.        As documented in HPI     Allergic/Immunologic: Positive for environmental allergies. Negative for food allergies and immunocompromised state.        As documented in HPI     Neurological:  Negative for dizziness, syncope, speech difficulty, weakness, light-headedness, numbness and headaches.   Hematological:  " Negative for adenopathy. Does not bruise/bleed easily.   Psychiatric/Behavioral:  Negative for suicidal ideas.             Review of patient's allergies indicates:   Allergen Reactions    Clams      Allergy testing confirmed    Codeine Itching and Other (See Comments)    Iodine Other (See Comments)    Scallops     Shrimp      Peeling shrimp, got a rash, allergy test confirmed         Current Outpatient Medications:     BIOTIN ORAL, Take 1 capsule by mouth Daily., Disp: , Rfl:     clobetasoL (CLOBEX) 0.05 % shampoo, Apply 1 application  topically once a week., Disp: , Rfl:     dicyclomine (BENTYL) 10 MG capsule, Take 10 mg by mouth 2 (two) times daily., Disp: , Rfl:     ergocalciferol (ERGOCALCIFEROL) 50,000 unit Cap, Take 1 capsule (50,000 Units total) by mouth every 7 days., Disp: 12 capsule, Rfl: 3    hydroCHLOROthiazide (MICROZIDE) 12.5 mg capsule, Take 12.5 mg by mouth once daily., Disp: , Rfl:     ketoconazole (NIZORAL) 2 % cream, Apply 1 application  topically as needed., Disp: , Rfl:     linaCLOtide (LINZESS) 72 mcg Cap capsule, Take 1 capsule (72 mcg total) by mouth once daily. (Patient taking differently: Take 72 mcg by mouth once daily. Not currently taking; will start after procedure), Disp: 30 capsule, Rfl: 11    NON FORMULARY MEDICATION, Take 1 tablet by mouth Daily. marvin, Disp: , Rfl:     omeprazole (PRILOSEC) 40 MG capsule, Take 1 capsule (40 mg total) by mouth once daily., Disp: , Rfl:     traZODone (DESYREL) 50 MG tablet, Take 1 tab nightly; may increase by 1 tab every 3 days to a maximum of 3 tabs for insomnia., Disp: 90 tablet, Rfl: 1    valsartan-hydrochlorothiazide (DIOVAN-HCT) 160-12.5 mg per tablet, Take 1 tablet by mouth once daily., Disp: 90 tablet, Rfl: 3    EPINEPHrine (EPIPEN) 0.3 mg/0.3 mL AtIn, Inject 0.3 mLs (0.3 mg total) into the muscle once. for 1 dose, Disp: 0.3 mL, Rfl: 0  No current facility-administered medications for this visit.    Patient Active Problem List   Diagnosis     Essential hypertension    GERD (gastroesophageal reflux disease)    Vitamin D deficiency    Increased risk of breast cancer    Overweight (BMI 25.0-29.9)    Fatty liver    Kidney cyst, acquired    JAYLON (obstructive sleep apnea)    Primary insomnia    Spasm of right side abdominal muscles    Hair loss disorder    Family history of colon cancer    Personal history of colonic polyps    Chronic idiopathic constipation    Chronic rhinitis    Diaphragmatic hernia without obstruction or gangrene       Past Medical History:   Diagnosis Date    Acute on chronic cholecystitis 03/19/2014    Formatting of this note might be different from the original.   dx update   IMO Update  dx update   IMO April 2016      Anemia 05/23/2024    Angioedema 05/23/2024    Atypical chest pain     Cervical radiculopathy     history of, no current problems    Constipation     COVID-19     General anesthetics causing adverse effect in therapeutic use     GERD (gastroesophageal reflux disease)     Hypertension     Insomnia     Kidney cyst, acquired 01/28/2021    Lumbar radiculopathy     JAYLON on CPAP     S/P ROLDAN-BSO 06/25/2018    Seasonal allergies     Shortness of breath     Urticaria, idiopathic 05/23/2024    Vitamin D deficiency        Past Surgical History:   Procedure Laterality Date    AXILLARY HIDRADENITIS EXCISION Bilateral     BUNIONECTOMY Bilateral     COLONOSCOPY      several    CYSTOSCOPY  06/22/2018    Procedure: CYSTOSCOPY;  Surgeon: Best Jefferson MD;  Location: [facility];  Service: Urology;;    ESOPHAGOGASTRODUODENOSCOPY      x2    ESOPHAGOGASTRODUODENOSCOPY N/A 11/09/2023    Procedure: EGD (ESOPHAGOGASTRODUODENOSCOPY);  Surgeon: Lilly Lagos MD;  Location: [facility];  Service: Endoscopy;  Laterality: N/A;    GALLBLADDER SURGERY      HYSTERECTOMY      LAPAROSCOPIC TOTAL HYSTERECTOMY N/A 06/22/2018    Procedure: HYSTERECTOMY, TOTAL, LAPAROSCOPIC, ATTEMPTED;  Surgeon: Nidia Bradley MD;  Location: [facility];  Service: OB/GYN;   Laterality: N/A;  attempted    LYSIS OF ADHESIONS OF URETER Right 06/22/2018    Procedure: URETEROLYSIS;  Surgeon: Best Jefferson MD;  Location: [facility];  Service: Urology;  Laterality: Right;    TOTAL ABDOMINAL HYSTERECTOMY W/ BILATERAL SALPINGOOPHORECTOMY Bilateral 06/22/2018    TUBAL LIGATION      WISDOM TOOTH EXTRACTION         Social History     Socioeconomic History    Marital status:     Number of children: 2   Tobacco Use    Smoking status: Never    Smokeless tobacco: Never   Substance and Sexual Activity    Alcohol use: Yes     Comment: occasional daiquiri    Drug use: Never    Sexual activity: Yes     Partners: Male   Social History Narrative    Patient is originally from [place] at ; [employer] ; [place]         Working ;  at [employer]         //e            Children    vionne -girl     Tanner - boy        Lives with     Mother, father            Diet/Exericse         Social Determinants of Health     Financial Resource Strain: Patient Declined (11/14/2023)    Overall Financial Resource Strain (CARDIA)     Difficulty of Paying Living Expenses: Patient declined   Food Insecurity: Patient Declined (11/14/2023)    Hunger Vital Sign     Worried About Running Out of Food in the Last Year: Patient declined     Ran Out of Food in the Last Year: Patient declined   Transportation Needs: Unknown (11/14/2023)    PRAPARE - Transportation     Lack of Transportation (Medical): Patient declined   Physical Activity: Unknown (11/14/2023)    Exercise Vital Sign     Days of Exercise per Week: Patient declined   Stress: Patient Declined (11/14/2023)    [employer] [place] [employer] - Occupational Stress Questionnaire     Feeling of Stress : Patient declined   Housing Stability: Unknown (11/14/2023)    Housing Stability Vital Sign     Unable to Pay for Housing in the Last Year: Patient refused     Unstable Housing in the Last Year: Patient refused       Family  "History   Problem Relation Name Age of Onset    Breast cancer Mother  53    Cancer Mother          breast cancer    Hypertension Mother      Ovarian cancer Cousin mat second     Cancer Cousin mat second         ovarian    Hypertension Sister      Breast cancer Sister  52    Glaucoma Father      Hypertension Father      Breast cancer Maternal Cousin mat third cousin     Breast cancer Maternal Cousin mat third     Heart disease Maternal Grandfather      Heart disease Paternal Grandfather      Colon cancer Brother  46       Objective     Vitals:    06/21/24 1502   BP: 100/74   Pulse: 89   Resp: 18   Temp: 97.9 °F (36.6 °C)   TempSrc: Oral   SpO2: 97%   Weight: 87.4 kg (192 lb 10.9 oz)   Height: 5' 9" (1.753 m)       Body mass index is 28.45 kg/m².    Physical Exam  Vitals and nursing note reviewed.   Constitutional:       Appearance: Normal appearance.   HENT:      Head: Normocephalic.      Nose: Nose normal.      Mouth/Throat:      Mouth: Mucous membranes are moist.   Eyes:      Conjunctiva/sclera: Conjunctivae normal.   Cardiovascular:      Rate and Rhythm: Normal rate and regular rhythm.   Pulmonary:      Effort: Pulmonary effort is normal.      Breath sounds: Normal breath sounds.   Musculoskeletal:         General: Normal range of motion.      Cervical back: Normal range of motion and neck supple.      Lumbar back: Tenderness present. No swelling, edema, deformity, signs of trauma, spasms or bony tenderness. Normal range of motion. Negative right straight leg raise test and negative left straight leg raise test.        Back:    Skin:     General: Skin is warm and dry.   Neurological:      General: No focal deficit present.      Mental Status: She is alert and oriented to person, place, and time.   Psychiatric:         Mood and Affect: Mood normal.         Behavior: Behavior normal.         Thought Content: Thought content normal.         Judgment: Judgment normal.            Assessment and Plan     1. Acute low " back pain, unspecified back pain laterality, unspecified whether sciatica present  -     methocarbamoL (ROBAXIN) 500 MG Tab; Take 1 tablet (500 mg total) by mouth 3 (three) times daily as needed (lower back pain).  Dispense: 30 tablet; Refill: 0    2. BMI 28.0-28.9,adult  BMI reviewed    3. Chronic rhinitis  -     Ambulatory referral/consult to Allergy; Future; Expected date: 06/21/2024    4. Anaphylaxis, initial encounter  -     EPINEPHrine 0.3 mg/0.3 mL Syrg; Use as needed for anaphylaxis  Dispense: 1 each; Refill: 2    Robaxin 500 mg every 8 hrs as needed for lower pain    Warm compresses as needed     Sciatica exercises per instructions    Referral to Allergist for work up     Resent epipen script for prn use    Self care instructions provided in AVS           Follow up if symptoms worsen or fail to improve.

## 2024-06-21 NOTE — PATIENT INSTRUCTIONS
Robaxin 500 mg every 8 hrs as needed for lower pain    Warm compresses as needed     Sciatica exercises per instructions    Referral to Allergist for work up     Resent epipen script for prn use

## 2024-07-29 ENCOUNTER — OFFICE VISIT (OUTPATIENT)
Dept: ALLERGY | Facility: CLINIC | Age: 64
End: 2024-07-29
Payer: COMMERCIAL

## 2024-07-29 VITALS — HEIGHT: 69 IN | WEIGHT: 191.13 LBS | BODY MASS INDEX: 28.31 KG/M2

## 2024-07-29 DIAGNOSIS — J30.89 SEASONAL ALLERGIC RHINITIS DUE TO OTHER ALLERGIC TRIGGER: ICD-10-CM

## 2024-07-29 DIAGNOSIS — Z91.018 HX OF FOOD ALLERGY: Primary | ICD-10-CM

## 2024-07-29 PROCEDURE — 99999 PR PBB SHADOW E&M-EST. PATIENT-LVL III: CPT | Mod: PBBFAC,,, | Performed by: ALLERGY & IMMUNOLOGY

## 2024-07-29 PROCEDURE — 99214 OFFICE O/P EST MOD 30 MIN: CPT | Mod: S$GLB,,, | Performed by: ALLERGY & IMMUNOLOGY

## 2024-07-29 RX ORDER — EPINEPHRINE 0.3 MG/.3ML
1 INJECTION SUBCUTANEOUS ONCE
Qty: 2 EACH | Refills: 1 | Status: SHIPPED | OUTPATIENT
Start: 2024-07-29 | End: 2024-07-29

## 2024-07-29 RX ORDER — FLUTICASONE PROPIONATE 50 MCG
2 SPRAY, SUSPENSION (ML) NASAL
COMMUNITY
Start: 2024-06-30

## 2024-07-29 NOTE — PROGRESS NOTES
Subjective:       Patient ID: Leschia T Bastian is a 63 y.o. female.    Chief Complaint:  Allergic Reaction  Hx food allergy    HPI:     Pt presents for epipen refill, re-eval food allergy. 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 Villafana prior to LV w me. Recalls positive testing to clam and scallop of uncertain clinical significance. Tolerates crab, crawfish, lobster. Doesn't recall adverse reaction to these. Doesn't recall eating these. Has hx of sporadic urticaria, though no other consistent, reproducible triggers have been identified. Tolerates wheat but has had sporadic episodes when wheat ingestion was assoc w urticaria. Pt wonders if it may be related to dust mite allergy  AR sx's controlled w prn flonase    Hx from 10/18/21:  Leschia T Bastian , w hx shrimp allergy, is here for evaluation of  urticaria . Patient's symptoms include urticaria. Hives are described as a red, raised, itchy and spreading skin rash that occurs on the entire body save lower legs.     Possible triggers include strawberry pop tart. She ate one of these on 10/4. Within 60 minutes noted some pruritus of scalp. Within 90 min of ingestion noted diffuse urticaria, with lower legs spared. Relief noted w benadryl. Had tolerated strawberry poptart prior. Has since had apple (an ingredient) w/o problem. No interval strawberries or pears. Sx's were limited to skin. No assoc GI sx's or resp distress.  Had similar episode yest am. Within in hour of eating hotcakes and sausage from [employer] started w itching of head, neck, ears followed about 30 min later by urticaria. No other body systems involved. Relief noted w benadryl.   Has tolerated all of these foods prior.  No assoc nsaids. Was otherwise in nl state of health.  Denies hx of chronic urticaria  Shrimp allergy was dx'd several years ago by Dr. Villafana. Positive immunoCAP and hx urticaria and angioedema. Tolerates crab. Pt recalls previous  testing also positive to dust mites and cockroach.  No hx asthma  Seasonal rhinitis-fall and spring, controlled w loratidine daily        Past Medical History:   Diagnosis Date    Acute on chronic cholecystitis 03/19/2014    Formatting of this note might be different from the original.   dx update   IMO Update  dx update   IMO April 2016      Allergy     Anemia 05/23/2024    Angioedema 05/23/2024    Atypical chest pain     Cervical radiculopathy     history of, no current problems    Constipation     COVID-19     General anesthetics causing adverse effect in therapeutic use     GERD (gastroesophageal reflux disease)     Hypertension     Insomnia     Kidney cyst, acquired 01/28/2021    Lumbar radiculopathy     JAYLON on CPAP     S/P ROLDAN-BSO 06/25/2018    Seasonal allergies     Shortness of breath     Urticaria, idiopathic 05/23/2024    Vitamin D deficiency        Family History   Problem Relation Name Age of Onset    Breast cancer Mother  53    Cancer Mother          breast cancer    Hypertension Mother      Glaucoma Father      Hypertension Father      Hypertension Sister      Breast cancer Sister  52    Colon cancer Brother  46    Heart disease Maternal Grandfather      Heart disease Paternal Grandfather      Ovarian cancer Cousin mat second     Cancer Cousin mat second         ovarian    Breast cancer Maternal Cousin mat third cousin     Breast cancer Maternal Cousin mat third     Eczema Daughter           Review of Systems   Constitutional:  Negative for activity change, fatigue and fever.   HENT:  Negative for congestion, postnasal drip, rhinorrhea, sinus pressure and sneezing.    Eyes:  Negative for discharge, redness and itching.   Respiratory:  Negative for cough, shortness of breath and wheezing.    Cardiovascular:  Negative for chest pain.   Gastrointestinal:  Negative for diarrhea, nausea and vomiting.   Genitourinary:  Negative for dysuria.   Musculoskeletal:  Negative for arthralgias and joint swelling.    Skin:  Negative for rash.   Neurological:  Negative for headaches.   Hematological:  Does not bruise/bleed easily.   Psychiatric/Behavioral:  Negative for behavioral problems and sleep disturbance.           Objective:   Physical Exam  Vitals and nursing note reviewed.   Constitutional:       General: She is not in acute distress.     Appearance: She is well-developed.   HENT:      Head: Normocephalic.      Right Ear: Tympanic membrane and external ear normal.      Left Ear: Tympanic membrane and external ear normal.      Nose: No septal deviation, mucosal edema or rhinorrhea.      Right Sinus: No maxillary sinus tenderness or frontal sinus tenderness.      Left Sinus: No maxillary sinus tenderness or frontal sinus tenderness.      Mouth/Throat:      Pharynx: Uvula midline. No uvula swelling.   Eyes:      General:         Right eye: No discharge.         Left eye: No discharge.      Conjunctiva/sclera: Conjunctivae normal.   Cardiovascular:      Rate and Rhythm: Normal rate and regular rhythm.   Pulmonary:      Effort: Pulmonary effort is normal. No respiratory distress.      Breath sounds: Normal breath sounds. No wheezing.   Abdominal:      General: Bowel sounds are normal.      Palpations: Abdomen is soft.      Tenderness: There is no abdominal tenderness.   Musculoskeletal:         General: No tenderness. Normal range of motion.      Cervical back: Normal range of motion and neck supple.   Lymphadenopathy:      Cervical: No cervical adenopathy.   Skin:     General: Skin is warm.      Findings: No erythema or rash.   Neurological:      Mental Status: She is alert and oriented to person, place, and time.   Psychiatric:         Behavior: Behavior normal.         Thought Content: Thought content normal.         Judgment: Judgment normal.          Latest Reference Range & Units Most Recent   A. alternata IgE <0.10 kU/L <0.10  10/18/21 12:15   Altern. alternata Class  CLASS 0  10/18/21 12:15   Aspergillus Fumigatus  IgE <0.10 kU/L <0.10  10/18/21 12:15   A. fumigatus Class  CLASS 0  10/18/21 12:15   ALLERGEN  PEAR IGE <0.10 kU/L <0.10  10/18/21 12:15   [place] Grass IgE <0.10 kU/L <0.10  10/18/21 12:15   [place] Class  CLASS 0  10/18/21 12:15   Cat Dander IgE <0.10 kU/L 0.24 (H)  10/18/21 12:15   Cat Epithelium Class  CLASS 0/1  10/18/21 12:15   [place] IgE <0.10 kU/L <0.10  10/18/21 12:15   [place] Class  CLASS 0  10/18/21 12:15   Cockroach, IgE <0.10 kU/L  -  4.50 (H)  10/18/21 12:15  CLASS 3  10/18/21 12:15   D. farinae <0.10 kU/L 2.15 (H)  10/18/21 12:15   D. farinae Class  CLASS 2  10/18/21 12:15   D. pteronyssinus Dust Mite IgE <0.10 kU/L 1.57 (H)  10/18/21 12:15   D. pteronyssinus Class  CLASS 2  10/18/21 12:15   Dog Dander IgE <0.10 kU/L 0.33 (H)  10/18/21 12:15   Dog Dander Class  CLASS 0/1  10/18/21 12:15   [place] Plantain IgE <0.10 kU/L <0.10  10/18/21 12:15   English Plantain Class  CLASS 0  10/18/21 12:15   [place] IgE <0.10 kU/L <0.10  10/18/21 12:15   [place] Class  CLASS 0  10/18/21 12:15   [place], Class  CLASS 0  10/18/21 12:15   Pear Class  CLASS 0  10/18/21 12:15   Pecan [place] Tree IgE <0.10 kU/L <0.10  10/18/21 12:15   Pecan, Class  CLASS 0  10/18/21 12:15   Pork IgE <0.10 kU/L <0.10  10/18/21 12:15   Pork Class  CLASS 0  10/18/21 12:15   Ragweed, Short, Class  CLASS 0/1  10/18/21 12:15   Ragweed, Short, IgE <0.10 kU/L 0.13 (H)  10/18/21 12:15   Strawberry IgE <0.10 kU/L <0.10  10/18/21 12:15   [place] Class  CLASS 0  10/18/21 12:15   Satish Grass IgE <0.10 kU/L <0.10  10/18/21 12:15   Satish Grass Class  CLASS 0  10/18/21 12:15   Wheat IgE <0.10 kU/L 0.40 (H)  10/18/21 12:15   Wheat Class  CLASS 1  10/18/21 12:15   (H): Data is abnormally high    Assessment:       1. Hx of food allergy    2. Seasonal allergic rhinitis due to other allergic trigger           Plan:       Auvi-q refill    immunoCAPs to shrimp, DM, scallop, clam  If shrimp negative, consider observed challenge    Fu pending results

## 2024-07-30 ENCOUNTER — LAB VISIT (OUTPATIENT)
Dept: LAB | Facility: HOSPITAL | Age: 64
End: 2024-07-30
Attending: STUDENT IN AN ORGANIZED HEALTH CARE EDUCATION/TRAINING PROGRAM
Payer: COMMERCIAL

## 2024-07-30 DIAGNOSIS — Z91.018 HX OF FOOD ALLERGY: ICD-10-CM

## 2024-07-30 PROCEDURE — 86003 ALLG SPEC IGE CRUDE XTRC EA: CPT | Mod: 59,PN | Performed by: ALLERGY & IMMUNOLOGY

## 2024-07-30 PROCEDURE — 86003 ALLG SPEC IGE CRUDE XTRC EA: CPT | Mod: PN | Performed by: ALLERGY & IMMUNOLOGY

## 2024-07-30 PROCEDURE — 36415 COLL VENOUS BLD VENIPUNCTURE: CPT | Mod: PN | Performed by: ALLERGY & IMMUNOLOGY

## 2024-08-02 LAB
CLAM IGE QN: 0.55 KU/L
D FARINAE IGE QN: 1.93 KU/L
D PTERONYSS IGE QN: 1.65 KU/L
RAST CLASS: ABNORMAL
SCALLOP IGE QN: 0.37 KU/L
SHRIMP IGE QN: 5.38 KU/L

## 2024-08-06 ENCOUNTER — OFFICE VISIT (OUTPATIENT)
Dept: GASTROENTEROLOGY | Facility: CLINIC | Age: 64
End: 2024-08-06
Payer: COMMERCIAL

## 2024-08-06 DIAGNOSIS — K21.9 GASTROESOPHAGEAL REFLUX DISEASE WITHOUT ESOPHAGITIS: ICD-10-CM

## 2024-08-06 DIAGNOSIS — R10.13 EPIGASTRIC PAIN: Primary | ICD-10-CM

## 2024-08-06 PROCEDURE — 99215 OFFICE O/P EST HI 40 MIN: CPT | Mod: 95,,,

## 2024-08-06 RX ORDER — SUCRALFATE 1 G/1
1 TABLET ORAL 3 TIMES DAILY PRN
Qty: 90 TABLET | Refills: 1 | Status: SHIPPED | OUTPATIENT
Start: 2024-08-06 | End: 2024-11-04

## 2024-08-06 RX ORDER — PANTOPRAZOLE SODIUM 40 MG/1
40 TABLET, DELAYED RELEASE ORAL DAILY
Qty: 90 TABLET | Refills: 3 | Status: SHIPPED | OUTPATIENT
Start: 2024-08-06 | End: 2025-08-06

## 2024-08-07 ENCOUNTER — PATIENT MESSAGE (OUTPATIENT)
Dept: ALLERGY | Facility: CLINIC | Age: 64
End: 2024-08-07
Payer: COMMERCIAL

## 2024-08-13 ENCOUNTER — OFFICE VISIT (OUTPATIENT)
Dept: SLEEP MEDICINE | Facility: CLINIC | Age: 64
End: 2024-08-13
Payer: COMMERCIAL

## 2024-08-13 VITALS
BODY MASS INDEX: 28.31 KG/M2 | SYSTOLIC BLOOD PRESSURE: 115 MMHG | DIASTOLIC BLOOD PRESSURE: 77 MMHG | HEART RATE: 78 BPM | HEIGHT: 69 IN | WEIGHT: 191.13 LBS

## 2024-08-13 DIAGNOSIS — G47.33 OSA (OBSTRUCTIVE SLEEP APNEA): ICD-10-CM

## 2024-08-13 PROCEDURE — 99999 PR PBB SHADOW E&M-EST. PATIENT-LVL III: CPT | Mod: PBBFAC,,, | Performed by: NURSE PRACTITIONER

## 2024-08-13 PROCEDURE — 99214 OFFICE O/P EST MOD 30 MIN: CPT | Mod: S$GLB,,, | Performed by: NURSE PRACTITIONER

## 2024-08-13 NOTE — PROGRESS NOTES
Cc: JAYLON     Continued to use her apap 8-14cm DS2 machine early 2023 but then had house repair (all floors up/etc..) and went into storage. Ready to resume use again, felt and slept better and snoring resolved. Gets online supplies. Dreamwear mask. Mom in hospital and grain elevator trying to go up close to their homes. No more mouth guard   5a-2p job, 1hr commute/hard on way home,  Remote 45d early 2023 avg 6.3h/n AHI 2.5, 90% tile 10.7cm    HST 3/14/17 AHI 13(RDI 28)/low sat 81.4%      ASSESSMENT:    1. JAYLON, adherent benefits from therapy. AHI<5. House work/supplies impacting use/ready to resume consistent use again   She has medical co-morbidities of hypertension, GERD, which can be worsened by JAYLON.       PLAN:  Discussed control of JAYLON when adherent  APAP 8-14cm resume, get supplies/ DME [facility]  Rtc annually, sooner if needed

## 2024-08-29 ENCOUNTER — OFFICE VISIT (OUTPATIENT)
Dept: ALLERGY | Facility: CLINIC | Age: 64
End: 2024-08-29
Payer: COMMERCIAL

## 2024-08-29 VITALS — BODY MASS INDEX: 28.24 KG/M2 | WEIGHT: 190.69 LBS | HEIGHT: 69 IN

## 2024-08-29 DIAGNOSIS — Z91.018 HX OF FOOD ALLERGY: ICD-10-CM

## 2024-08-29 DIAGNOSIS — J30.89 SEASONAL ALLERGIC RHINITIS DUE TO OTHER ALLERGIC TRIGGER: ICD-10-CM

## 2024-08-29 DIAGNOSIS — L50.9 URTICARIA: Primary | ICD-10-CM

## 2024-08-29 PROCEDURE — 99999 PR PBB SHADOW E&M-EST. PATIENT-LVL III: CPT | Mod: PBBFAC,,, | Performed by: ALLERGY & IMMUNOLOGY

## 2024-08-29 NOTE — PROGRESS NOTES
Subjective:       Patient ID: Leschia T Bastian is a 64 y.o. female.    Chief Complaint:  Allergy Testing  Hx food allergy    HPI:     Pt presents for food skin testing. Recalls one interval episode urticaria w/o obvious trigger.    Hx 7/29/24:  Pt presents for epipen refill, re-eval food allergy. 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 Villafana prior to LV w me. Recalls positive testing to clam and scallop of uncertain clinical significance. Tolerates crab, crawfish, lobster. Doesn't recall adverse reaction to these. Doesn't recall eating these. Has hx of sporadic urticaria, though no other consistent, reproducible triggers have been identified. Tolerates wheat but has had sporadic episodes when wheat ingestion was assoc w urticaria. Pt wonders if it may be related to dust mite allergy  AR sx's controlled w prn flonase    Hx from 10/18/21:  Leschia T Bastian , w hx shrimp allergy, is here for evaluation of  urticaria . Patient's symptoms include urticaria. Hives are described as a red, raised, itchy and spreading skin rash that occurs on the entire body save lower legs.     Possible triggers include strawberry pop tart. She ate one of these on 10/4. Within 60 minutes noted some pruritus of scalp. Within 90 min of ingestion noted diffuse urticaria, with lower legs spared. Relief noted w benadryl. Had tolerated strawberry poptart prior. Has since had apple (an ingredient) w/o problem. No interval strawberries or pears. Sx's were limited to skin. No assoc GI sx's or resp distress.  Had similar episode yest am. Within in hour of eating hotcakes and sausage from [employer] started w itching of head, neck, ears followed about 30 min later by urticaria. No other body systems involved. Relief noted w benadryl.   Has tolerated all of these foods prior.  No assoc nsaids. Was otherwise in nl state of health.  Denies hx of chronic urticaria  Shrimp allergy was dx'd several years ago by  Dr. Villafana. Positive immunoCAP and hx urticaria and angioedema. Tolerates crab. Pt recalls previous testing also positive to dust mites and cockroach.  No hx asthma  Seasonal rhinitis-fall and spring, controlled w loratidine daily        Past Medical History:   Diagnosis Date    Acute on chronic cholecystitis 03/19/2014    Formatting of this note might be different from the original.   dx update   IMO Update  dx update   IMO April 2016      Allergy     Anemia 05/23/2024    Angioedema 05/23/2024    Atypical chest pain     Cervical radiculopathy     history of, no current problems    Constipation     COVID-19     General anesthetics causing adverse effect in therapeutic use     GERD (gastroesophageal reflux disease)     Hypertension     Insomnia     Kidney cyst, acquired 01/28/2021    Lumbar radiculopathy     JAYLON on CPAP     S/P ROLDAN-BSO 06/25/2018    Seasonal allergies     Shortness of breath     Urticaria, idiopathic 05/23/2024    Vitamin D deficiency        Family History   Problem Relation Name Age of Onset    Breast cancer Mother  53    Cancer Mother          breast cancer    Hypertension Mother      Glaucoma Father      Hypertension Father      Hypertension Sister      Breast cancer Sister  52    Colon cancer Brother  46    Heart disease Maternal Grandfather      Heart disease Paternal Grandfather      Ovarian cancer Cousin mat second     Cancer Cousin mat second         ovarian    Breast cancer Maternal Cousin mat third cousin     Breast cancer Maternal Cousin mat third     Eczema Daughter           Review of Systems   Constitutional:  Negative for activity change, fatigue and fever.   HENT:  Negative for congestion, postnasal drip, rhinorrhea, sinus pressure and sneezing.    Eyes:  Negative for discharge, redness and itching.   Respiratory:  Negative for cough, shortness of breath and wheezing.    Cardiovascular:  Negative for chest pain.   Gastrointestinal:  Negative for diarrhea, nausea and vomiting.    Genitourinary:  Negative for dysuria.   Musculoskeletal:  Negative for arthralgias and joint swelling.   Skin:  Negative for rash.   Neurological:  Negative for headaches.   Hematological:  Does not bruise/bleed easily.   Psychiatric/Behavioral:  Negative for behavioral problems and sleep disturbance.           Objective:   Physical Exam  Vitals and nursing note reviewed.   Constitutional:       General: She is not in acute distress.     Appearance: She is well-developed.   HENT:      Head: Normocephalic.      Right Ear: Tympanic membrane and external ear normal.      Left Ear: Tympanic membrane and external ear normal.      Nose: No septal deviation, mucosal edema or rhinorrhea.      Right Sinus: No maxillary sinus tenderness or frontal sinus tenderness.      Left Sinus: No maxillary sinus tenderness or frontal sinus tenderness.      Mouth/Throat:      Pharynx: Uvula midline. No uvula swelling.   Eyes:      General:         Right eye: No discharge.         Left eye: No discharge.      Conjunctiva/sclera: Conjunctivae normal.   Cardiovascular:      Rate and Rhythm: Normal rate and regular rhythm.   Pulmonary:      Effort: Pulmonary effort is normal. No respiratory distress.      Breath sounds: Normal breath sounds. No wheezing.   Abdominal:      General: Bowel sounds are normal.      Palpations: Abdomen is soft.      Tenderness: There is no abdominal tenderness.   Musculoskeletal:         General: No tenderness. Normal range of motion.      Cervical back: Normal range of motion and neck supple.   Lymphadenopathy:      Cervical: No cervical adenopathy.   Skin:     General: Skin is warm.      Findings: No erythema or rash.   Neurological:      Mental Status: She is alert and oriented to person, place, and time.   Psychiatric:         Behavior: Behavior normal.         Thought Content: Thought content normal.         Judgment: Judgment normal.          Latest Reference Range & Units Most Recent   A. alternata IgE  <0.10 kU/L <0.10  10/18/21 12:15   Altern. alternata Class  CLASS 0  10/18/21 12:15   Aspergillus Fumigatus IgE <0.10 kU/L <0.10  10/18/21 12:15   A. fumigatus Class  CLASS 0  10/18/21 12:15   ALLERGEN  PEAR IGE <0.10 kU/L <0.10  10/18/21 12:15   [place] Grass IgE <0.10 kU/L <0.10  10/18/21 12:15   [place] Class  CLASS 0  10/18/21 12:15   Cat Dander IgE <0.10 kU/L 0.24 (H)  10/18/21 12:15   Cat Epithelium Class  CLASS 0/1  10/18/21 12:15   [place] IgE <0.10 kU/L <0.10  10/18/21 12:15   [place] Class  CLASS 0  10/18/21 12:15   Cockroach, IgE <0.10 kU/L  -  4.50 (H)  10/18/21 12:15  CLASS 3  10/18/21 12:15   D. farinae <0.10 kU/L 2.15 (H)  10/18/21 12:15   D. farinae Class  CLASS 2  10/18/21 12:15   D. pteronyssinus Dust Mite IgE <0.10 kU/L 1.57 (H)  10/18/21 12:15   D. pteronyssinus Class  CLASS 2  10/18/21 12:15   Dog Dander IgE <0.10 kU/L 0.33 (H)  10/18/21 12:15   Dog Dander Class  CLASS 0/1  10/18/21 12:15   English Plantain IgE <0.10 kU/L <0.10  10/18/21 12:15   English Plantain Class  CLASS 0  10/18/21 12:15   [place] IgE <0.10 kU/L <0.10  10/18/21 12:15   [place] Class  CLASS 0  10/18/21 12:15   [place], Class  CLASS 0  10/18/21 12:15   Pear Class  CLASS 0  10/18/21 12:15   Pecan Wing Tree IgE <0.10 kU/L <0.10  10/18/21 12:15   Pecan, Class  CLASS 0  10/18/21 12:15   Pork IgE <0.10 kU/L <0.10  10/18/21 12:15   Pork Class  CLASS 0  10/18/21 12:15   Ragweed, Short, Class  CLASS 0/1  10/18/21 12:15   Ragweed, Short, IgE <0.10 kU/L 0.13 (H)  10/18/21 12:15   Strawberry IgE <0.10 kU/L <0.10  10/18/21 12:15   [place] Class  CLASS 0  10/18/21 12:15   Satish Grass IgE <0.10 kU/L <0.10  10/18/21 12:15   Satish Grass Class  CLASS 0  10/18/21 12:15   Wheat IgE <0.10 kU/L 0.40 (H)  10/18/21 12:15   Wheat Class  CLASS 1  10/18/21 12:15   (H): Data is abnormally high       Latest Reference Range & Units 07/30/24 16:09   Clams <0.10 kU/L 0.55 (H)   Clam Class  CLASS 1   D. farinae <0.10 kU/L 1.93 (H)   D. farinae Class   CLASS 2   D. pteronyssinus Dust Mite IgE <0.10 kU/L 1.65 (H)   D. pteronyssinus Class  CLASS 2   Scallop IgE <0.10 kU/L 0.37 (H)   Scallop Class  CLASS 1   Shrimp IgE <0.10 kU/L 5.38 (H)   Shrimp Class  CLASS 3   (H): Data is abnormally high    Food Allergen Skin Test    Row Name 08/29/24 1151       Negative for all food allergens   Negative for all food allergens 0       Dairy Products   Negative for all dairy product allergens 0       Milk Grade 0       Poultry Products   Negative for all poultry products 0       Chicken Grade 0       Egg Grade 0       Turkey Grade 0       Meats   Negative for all meats 0       Beef Grade 0       [place] Grade 0       Pork Grade 0       Fish & Shellfish   Negative for all fish & shellfish 0       Clam Grade 0       Crab Grade 0       Flounder Grade 0       Lobster Grade 0       Oyster Grade 0       [place] Grade 0       Shrimp Grade 0       Tuna Grade 0       Nuts & Legumes   Negative for nuts & legumes 0       [place] Grade 0       Brazil Nut Grade 0       Cashew Grade 0       Coconut Grade 0       Hazelnut (Filbert) Grade 0       Peanut Grade 0       Pecan Grade 0       [place] Grade 0       Grains   Negative for all grains 0       Corn Grade 0       Oat Grade 0       Rice Grade 0       Rye Grade 0       Wheat Grade 0       Seeds   Negative for all seeds 0       Mustard Grade 0       Sesame Grade 0       Sunflower Grade 0       Fruits   Negative for all fruits 0       Apple Grade 0       Apricot Grade 0       Banana Grade 0       Cantaloupe Grade 0       Peach Grade 0       Pineapple Grade 0       Strawberry Grade 0       Tomato Grade 0       Watermelon Grade 0       Citrus Fruits   Negative for all citrus fruits 0       Grapefruit Grade 0       Lemon Grade 0       Orange Grade 0       Vegetables   Negative for all vegtables 0       Beans (String) Grade 0       Broccoli Grade 0       Carrots Grade 0       Celery Grade 0       Garlic Grade 0       Mushroom Grade 0       Onion Grade 0        Peas (Green) Grade 0       Potatoes (Sweet) Grade 0       Potatoes (White) Grade 0       Soybean Grade 0       Spinach Grade 0       Other Allergens   Negative for all other allergens 0       Cinnamon Grade 0       Cocoa Grade 0       Coffee Grade 0       Controls   Saline Grade 0       Histamine Grade 3           Assessment:       1. Urticaria --sporadic. Suspect spontaneous   2. Hx of food allergy. Shrimp allergy. Negative skin test. Positive immunoCAP   3. Seasonal allergic rhinitis due to other allergic trigger             Plan:         Discussed poss observed challenge to shrimp, given neg spt. Pt declines. Will continue shrimp avoidance. Has epipen.  OK for otherwise unrestricted diet    Loratidine 10-20 mg up to twice daily prn urticaria    Fu prn        Apart from skin testing, total of 23 minutes on encounter the day of the visit. This includes face to face time and non-face to face time preparing to see the patient (eg, review of tests), obtaining and/or reviewing separately obtained history, documenting clinical information in the electronic or other health record, independently interpreting results and communicating results to the patient/family/caregiver, or care coordinator.

## 2025-05-28 ENCOUNTER — TELEPHONE (OUTPATIENT)
Dept: INTERNAL MEDICINE | Facility: CLINIC | Age: 65
End: 2025-05-28
Payer: COMMERCIAL

## 2025-05-29 ENCOUNTER — OFFICE VISIT (OUTPATIENT)
Dept: INTERNAL MEDICINE | Facility: CLINIC | Age: 65
End: 2025-05-29
Attending: FAMILY MEDICINE
Payer: COMMERCIAL

## 2025-05-29 ENCOUNTER — LAB VISIT (OUTPATIENT)
Dept: LAB | Facility: HOSPITAL | Age: 65
End: 2025-05-29
Attending: FAMILY MEDICINE
Payer: COMMERCIAL

## 2025-05-29 ENCOUNTER — RESULTS FOLLOW-UP (OUTPATIENT)
Dept: INTERNAL MEDICINE | Facility: CLINIC | Age: 65
End: 2025-05-29

## 2025-05-29 VITALS
OXYGEN SATURATION: 96 % | HEART RATE: 80 BPM | SYSTOLIC BLOOD PRESSURE: 118 MMHG | DIASTOLIC BLOOD PRESSURE: 84 MMHG | BODY MASS INDEX: 28.73 KG/M2 | HEIGHT: 69 IN | WEIGHT: 194 LBS

## 2025-05-29 DIAGNOSIS — R94.5 ABNORMAL RESULTS OF LIVER FUNCTION STUDIES: ICD-10-CM

## 2025-05-29 DIAGNOSIS — E55.9 VITAMIN D DEFICIENCY: Primary | ICD-10-CM

## 2025-05-29 DIAGNOSIS — K21.9 GASTROESOPHAGEAL REFLUX DISEASE WITHOUT ESOPHAGITIS: ICD-10-CM

## 2025-05-29 DIAGNOSIS — M79.10 MYALGIA: ICD-10-CM

## 2025-05-29 DIAGNOSIS — I10 HYPERTENSION, ESSENTIAL: ICD-10-CM

## 2025-05-29 DIAGNOSIS — E55.9 VITAMIN D DEFICIENCY: ICD-10-CM

## 2025-05-29 DIAGNOSIS — M54.50 CHRONIC RIGHT-SIDED LOW BACK PAIN WITHOUT SCIATICA: ICD-10-CM

## 2025-05-29 DIAGNOSIS — Z12.31 ENCOUNTER FOR SCREENING MAMMOGRAM FOR MALIGNANT NEOPLASM OF BREAST: ICD-10-CM

## 2025-05-29 DIAGNOSIS — G47.33 OSA (OBSTRUCTIVE SLEEP APNEA): ICD-10-CM

## 2025-05-29 DIAGNOSIS — Z00.00 ANNUAL PHYSICAL EXAM: Primary | ICD-10-CM

## 2025-05-29 DIAGNOSIS — G89.29 CHRONIC RIGHT-SIDED LOW BACK PAIN WITHOUT SCIATICA: ICD-10-CM

## 2025-05-29 DIAGNOSIS — F51.01 PRIMARY INSOMNIA: ICD-10-CM

## 2025-05-29 DIAGNOSIS — R10.9 ABDOMINAL PAIN, UNSPECIFIED ABDOMINAL LOCATION: ICD-10-CM

## 2025-05-29 DIAGNOSIS — Z00.00 ANNUAL PHYSICAL EXAM: ICD-10-CM

## 2025-05-29 DIAGNOSIS — N28.1 KIDNEY CYST, ACQUIRED: ICD-10-CM

## 2025-05-29 LAB
25(OH)D3+25(OH)D2 SERPL-MCNC: 22 NG/ML (ref 30–96)
ALBUMIN SERPL BCP-MCNC: 4.2 G/DL (ref 3.5–5.2)
ALP SERPL-CCNC: 108 UNIT/L (ref 40–150)
ALT SERPL W/O P-5'-P-CCNC: 59 UNIT/L (ref 10–44)
ANION GAP (OHS): 9 MMOL/L (ref 8–16)
AST SERPL-CCNC: 30 UNIT/L (ref 11–45)
BILIRUB SERPL-MCNC: 0.6 MG/DL (ref 0.1–1)
BUN SERPL-MCNC: 13 MG/DL (ref 8–23)
CALCIUM SERPL-MCNC: 9.6 MG/DL (ref 8.7–10.5)
CHLORIDE SERPL-SCNC: 107 MMOL/L (ref 95–110)
CHOLEST SERPL-MCNC: 170 MG/DL (ref 120–199)
CHOLEST/HDLC SERPL: 3.9 {RATIO} (ref 2–5)
CK SERPL-CCNC: 97 U/L (ref 20–180)
CO2 SERPL-SCNC: 27 MMOL/L (ref 23–29)
CREAT SERPL-MCNC: 0.8 MG/DL (ref 0.5–1.4)
CRP SERPL-MCNC: 5.6 MG/L
ERYTHROCYTE [DISTWIDTH] IN BLOOD BY AUTOMATED COUNT: 13.8 % (ref 11.5–14.5)
ERYTHROCYTE [SEDIMENTATION RATE] IN BLOOD BY PHOTOMETRIC METHOD: 28 MM/HR
GFR SERPLBLD CREATININE-BSD FMLA CKD-EPI: >60 ML/MIN/1.73/M2
GLUCOSE SERPL-MCNC: 92 MG/DL (ref 70–110)
HCT VFR BLD AUTO: 41.8 % (ref 37–48.5)
HDLC SERPL-MCNC: 44 MG/DL (ref 40–75)
HDLC SERPL: 25.9 % (ref 20–50)
HGB BLD-MCNC: 12.2 GM/DL (ref 12–16)
LDLC SERPL CALC-MCNC: 106.4 MG/DL (ref 63–159)
MCH RBC QN AUTO: 22.8 PG (ref 27–31)
MCHC RBC AUTO-ENTMCNC: 29.2 G/DL (ref 32–36)
MCV RBC AUTO: 78 FL (ref 82–98)
NONHDLC SERPL-MCNC: 126 MG/DL
PLATELET # BLD AUTO: 230 K/UL (ref 150–450)
PMV BLD AUTO: 12.1 FL (ref 9.2–12.9)
POTASSIUM SERPL-SCNC: 4.2 MMOL/L (ref 3.5–5.1)
PROT SERPL-MCNC: 7.7 GM/DL (ref 6–8.4)
RBC # BLD AUTO: 5.36 M/UL (ref 4–5.4)
SODIUM SERPL-SCNC: 143 MMOL/L (ref 136–145)
TRIGL SERPL-MCNC: 98 MG/DL (ref 30–150)
TSH SERPL-ACNC: 1.14 UIU/ML (ref 0.4–4)
WBC # BLD AUTO: 3.31 K/UL (ref 3.9–12.7)

## 2025-05-29 PROCEDURE — 82465 ASSAY BLD/SERUM CHOLESTEROL: CPT

## 2025-05-29 PROCEDURE — 99999 PR PBB SHADOW E&M-EST. PATIENT-LVL V: CPT | Mod: PBBFAC,,, | Performed by: FAMILY MEDICINE

## 2025-05-29 PROCEDURE — 82565 ASSAY OF CREATININE: CPT

## 2025-05-29 PROCEDURE — 86140 C-REACTIVE PROTEIN: CPT

## 2025-05-29 PROCEDURE — 85652 RBC SED RATE AUTOMATED: CPT

## 2025-05-29 PROCEDURE — 84443 ASSAY THYROID STIM HORMONE: CPT

## 2025-05-29 PROCEDURE — 36415 COLL VENOUS BLD VENIPUNCTURE: CPT

## 2025-05-29 PROCEDURE — 99396 PREV VISIT EST AGE 40-64: CPT | Mod: S$GLB,,, | Performed by: FAMILY MEDICINE

## 2025-05-29 PROCEDURE — 82306 VITAMIN D 25 HYDROXY: CPT

## 2025-05-29 PROCEDURE — 85027 COMPLETE CBC AUTOMATED: CPT

## 2025-05-29 PROCEDURE — 82550 ASSAY OF CK (CPK): CPT

## 2025-05-29 RX ORDER — ERGOCALCIFEROL 1.25 MG/1
50000 CAPSULE ORAL
COMMUNITY

## 2025-05-29 RX ORDER — ASPIRIN 325 MG
50000 TABLET, DELAYED RELEASE (ENTERIC COATED) ORAL WEEKLY
Qty: 8 CAPSULE | Refills: 0 | Status: SHIPPED | OUTPATIENT
Start: 2025-05-29 | End: 2025-07-18

## 2025-05-29 RX ORDER — PANTOPRAZOLE SODIUM 40 MG/1
40 TABLET, DELAYED RELEASE ORAL DAILY
Qty: 90 TABLET | Refills: 0 | Status: SHIPPED | OUTPATIENT
Start: 2025-05-29 | End: 2025-08-27

## 2025-05-29 NOTE — PROGRESS NOTES
Subjective:       Patient ID: Leschia T Bastian is a 64 y.o. female.    Chief Complaint: Establish Care    New patient tannual wellness check, physical exam; and also chronic disease management. Specific complaints - see dictation, M*model entries and please see ROS.  Past, Surgical, Family, Social Histories; Medications, Allergies reviewed and reconciled.  Health maintenance file reviewed and addressed items due. Recent applicable lab, imaging and cardiovascular results reviewed.  Problem list items reviewed and modified or added entries (in the overview section) may not be transcribed into this encounter note due to note writer format.    Multiple c/o.    Achiness in the left upper extremity, back and lower extremities.  Independent.  Sounds more muscular than joint.  Does not sound neuropathic.  No definite numbness or tingling.  Chronic.  Recurrent.  Occasionally describes what sounds like a cramp.  Pain in back as well..    Requesting medication refills.  Several medications filled by other specialists including pantoprazole for chronic reflux.  States she has been out and taking Nexium, not as helpful.  Was taking 88188 units vitamin-D weekly, unclear diagnosis.    Abdominal discomfort which is better.  Lower abdomen.  Getting treatment for some constipation.  Last colonoscopy unclear, may have been outside system.  Last EGD 2023.    Note chronic elevation of the ALT.  No prior workup.  No jaundice or right upper quadrant pain.    Note mentioned of renal cyst in problem list.  Assume this may have been on prior imaging as an incidental finding.  No follow-up noted.                      Review of Systems   Constitutional:  Positive for fatigue. Negative for appetite change, chills, diaphoresis and fever.   HENT:  Positive for congestion. Negative for postnasal drip, rhinorrhea, sore throat and trouble swallowing.    Eyes:  Negative for visual disturbance.   Respiratory:  Negative for cough, choking, chest  tightness, shortness of breath and wheezing.    Cardiovascular:  Negative for chest pain and leg swelling.   Gastrointestinal:  Positive for abdominal pain. Negative for abdominal distention, diarrhea, nausea and vomiting.        Chronic reflux   Genitourinary:  Negative for difficulty urinating and hematuria.   Musculoskeletal:  Positive for arthralgias, back pain and myalgias.   Skin:  Negative for rash.   Neurological:  Negative for weakness, light-headedness and headaches.   Hematological:  Does not bruise/bleed easily.   Psychiatric/Behavioral:  Positive for sleep disturbance. Negative for decreased concentration and dysphoric mood.        Objective:      Physical Exam  Vitals and nursing note reviewed.   Constitutional:       Appearance: She is well-developed. She is not diaphoretic.   Eyes:      General: No scleral icterus.  Neck:      Thyroid: No thyromegaly.      Vascular: No JVD.      Trachea: No tracheal deviation.   Cardiovascular:      Rate and Rhythm: Normal rate.      Heart sounds: Normal heart sounds. No murmur heard.     No friction rub. No gallop.   Pulmonary:      Effort: Pulmonary effort is normal. No respiratory distress.      Breath sounds: Normal breath sounds. No wheezing or rales.   Abdominal:      General: There is no distension or abdominal bruit.      Palpations: Abdomen is soft. There is no mass.      Tenderness: There is abdominal tenderness. There is no right CVA tenderness, left CVA tenderness, guarding or rebound.   Musculoskeletal:      Cervical back: Normal range of motion and neck supple.   Lymphadenopathy:      Cervical: No cervical adenopathy.   Skin:     General: Skin is warm and dry.      Findings: No erythema or rash.   Neurological:      Mental Status: She is alert and oriented to person, place, and time.      Cranial Nerves: No cranial nerve deficit.      Motor: No tremor.      Coordination: Coordination normal.      Gait: Gait normal.   Psychiatric:         Behavior:  Behavior normal.         Thought Content: Thought content normal.         Judgment: Judgment normal.         Assessment:       1. Annual physical exam    2. Myalgia    3. Hypertension, essential    4. Gastroesophageal reflux disease without esophagitis    5. Vitamin D deficiency    6. Primary insomnia    7. JAYLON (obstructive sleep apnea)    8. Kidney cyst, acquired    9. Encounter for screening mammogram for malignant neoplasm of breast    10. Chronic right-sided low back pain without sciatica    11. Abnormal results of liver function studies    12. Abdominal pain, unspecified abdominal location        Plan:     Medication List with Changes/Refills   Current Medications    BIOTIN ORAL    Take 1 capsule by mouth Daily.    EPINEPHRINE 0.3 MG/0.3 ML SYRG    Use as needed for anaphylaxis    ERGOCALCIFEROL (VITAMIN D2) 50,000 UNIT CAP    Take 50,000 Units by mouth every 7 days.    FLUTICASONE PROPIONATE (FLONASE) 50 MCG/ACTUATION NASAL SPRAY    2 sprays by Each Nostril route.    LINACLOTIDE (LINZESS) 72 MCG CAP CAPSULE    Take 1 capsule (72 mcg total) by mouth once daily.    METHOCARBAMOL (ROBAXIN) 500 MG TAB    Take 1 tablet (500 mg total) by mouth 3 (three) times daily as needed (lower back pain).    VALSARTAN-HYDROCHLOROTHIAZIDE (DIOVAN-HCT) 160-12.5 MG PER TABLET    Take 1 tablet by mouth once daily.   Changed and/or Refilled Medications    Modified Medication Previous Medication    PANTOPRAZOLE (PROTONIX) 40 MG TABLET pantoprazole (PROTONIX) 40 MG tablet       Take 1 tablet (40 mg total) by mouth once daily.    Take 1 tablet (40 mg total) by mouth once daily.   Discontinued Medications    CLOBETASOL (CLOBEX) 0.05 % SHAMPOO    Apply 1 application  topically once a week.    DICYCLOMINE (BENTYL) 10 MG CAPSULE    Take 10 mg by mouth 2 (two) times daily.    ERGOCALCIFEROL (ERGOCALCIFEROL) 50,000 UNIT CAP    Take 1 capsule (50,000 Units total) by mouth every 7 days.    HYDROCHLOROTHIAZIDE (MICROZIDE) 12.5 MG CAPSULE     Take 12.5 mg by mouth once daily.    KETOCONAZOLE (NIZORAL) 2 % CREAM    Apply 1 application  topically as needed.    NON FORMULARY MEDICATION    Take 1 tablet by mouth Daily. marvin    TRAZODONE (DESYREL) 50 MG TABLET    Take 1 tab nightly; may increase by 1 tab every 3 days to a maximum of 3 tabs for insomnia.     1. Annual physical exam  -     Comprehensive Metabolic Panel; Future; Expected date: 05/29/2025  -     Lipid Panel; Future; Expected date: 05/29/2025  -     CBC Without Differential; Future; Expected date: 05/29/2025  -     TSH; Future; Expected date: 05/29/2025  -     CK; Future; Expected date: 05/29/2025  -     C-Reactive Protein; Future; Expected date: 05/29/2025  -     Sedimentation rate; Future; Expected date: 05/29/2025    2. Myalgia  -     TSH; Future; Expected date: 05/29/2025  -     CK; Future; Expected date: 05/29/2025  -     C-Reactive Protein; Future; Expected date: 05/29/2025  -     Sedimentation rate; Future; Expected date: 05/29/2025  -     Ambulatory referral/consult to Rheumatology; Future; Expected date: 06/05/2025  -     Ambulatory referral/consult to Spine Care; Future; Expected date: 06/05/2025    3. Hypertension, essential  Overview:  Elevated today   Losartan 50   + headaches in night  Feels needs adjustment; was on 100mg in past      4. Gastroesophageal reflux disease without esophagitis  Overview:  PPI daily       Assessment & Plan:  -[facility]  -[facility] APRN 8/2024  -EGD 11/2023    Orders:  -     Ambulatory referral/consult to Gastroenterology; Future; Expected date: 06/05/2025  -     pantoprazole (PROTONIX) 40 MG tablet; Take 1 tablet (40 mg total) by mouth once daily.  Dispense: 90 tablet; Refill: 0    5. Vitamin D deficiency  Overview:  06048 IU weekly     Orders:  -     Vitamin D; Future; Expected date: 05/29/2025    6. Primary insomnia  Overview:  trazodone QHS PRN  Sleeps most nights  CPAP helps as well   Has woken few times with headaches       7. JAYLON  (obstructive sleep apnea)  Overview:  Back on CPAP   Big difference   Feels a lot better overall       8. Kidney cyst, acquired  Assessment & Plan:  ? 2021 imaging    Orders:  -     US Retroperitoneal Complete; Future; Expected date: 05/29/2025  -     Urinalysis, Reflex to Urine Culture Urine, Clean Catch; Future    9. Encounter for screening mammogram for malignant neoplasm of breast  -     Mammo Digital Screening Bilat w/ [place] (XPD); Future; Expected date: 05/29/2025    10. Chronic right-sided low back pain without sciatica  -     Ambulatory referral/consult to Spine Care; Future; Expected date: 06/05/2025  -     Urinalysis, Reflex to Urine Culture Urine, Clean Catch; Future    11. Abnormal results of liver function studies  -     Ambulatory referral/consult to Hepatology; Future; Expected date: 06/05/2025    12. Abdominal pain, unspecified abdominal location  -     Ambulatory referral/consult to Obstetrics / Gynecology      See meds, orders, follow up, routing and instructions sections of encounter and AVS. Discussed with patient and provided on AVS.      Discussed diet and exercise as therapeutic modalities for metabolic and other conditions. Provided patient information, which are included as links on the AVS for detailed information.    Lab Results   Component Value Date     05/23/2024    K 3.8 05/23/2024     05/23/2024    BUN 11 05/23/2024    CREATININE 0.8 05/23/2024    GLU 92 05/23/2024    HGBA1C 5.5 05/23/2024    MG 1.9 05/03/2008    AST 38 05/23/2024    ALT 56 (H) 05/23/2024    ALBUMIN 4.1 05/23/2024    PROT 7.4 05/23/2024    BILITOT 0.8 05/23/2024    CHOL 177 05/23/2024    HDL 48 05/23/2024    LDLCALC 109.0 05/23/2024    TRIG 100 05/23/2024    WBC 4.09 05/23/2024    HGB 12.8 05/23/2024    HCT 42.8 05/23/2024     05/23/2024    TSH 1.249 05/23/2024    BNP 24 04/13/2022           Consult placed for evaluation of abdominal pain.  She was tender in the abdomen diffusely possibly left lower  quadrant today.  No rebound or guarding.  I asked her to watch this carefully and go to urgent care or emergency room if this gets worse.  Gastroenterology and OBGYN consult.  Check urinalysis and laboratory.    Follow-up in 3 months to go over test results and consult recommendations.

## 2025-06-02 ENCOUNTER — OFFICE VISIT (OUTPATIENT)
Dept: RHEUMATOLOGY | Facility: CLINIC | Age: 65
End: 2025-06-02
Attending: FAMILY MEDICINE
Payer: COMMERCIAL

## 2025-06-02 ENCOUNTER — HOSPITAL ENCOUNTER (OUTPATIENT)
Dept: RADIOLOGY | Facility: HOSPITAL | Age: 65
Discharge: HOME | End: 2025-06-02
Attending: FAMILY MEDICINE
Payer: COMMERCIAL

## 2025-06-02 VITALS
DIASTOLIC BLOOD PRESSURE: 74 MMHG | WEIGHT: 190.25 LBS | HEART RATE: 91 BPM | BODY MASS INDEX: 28.1 KG/M2 | SYSTOLIC BLOOD PRESSURE: 111 MMHG

## 2025-06-02 DIAGNOSIS — G89.29 CHRONIC LEFT SHOULDER PAIN: Primary | ICD-10-CM

## 2025-06-02 DIAGNOSIS — G47.62 NOCTURNAL LEG CRAMPS: ICD-10-CM

## 2025-06-02 DIAGNOSIS — M47.816 LUMBAR SPONDYLOSIS: ICD-10-CM

## 2025-06-02 DIAGNOSIS — M25.512 CHRONIC LEFT SHOULDER PAIN: Primary | ICD-10-CM

## 2025-06-02 DIAGNOSIS — M79.10 MYALGIA: ICD-10-CM

## 2025-06-02 DIAGNOSIS — N28.1 KIDNEY CYST, ACQUIRED: ICD-10-CM

## 2025-06-02 PROCEDURE — 76770 US EXAM ABDO BACK WALL COMP: CPT | Mod: 26,,, | Performed by: RADIOLOGY

## 2025-06-02 PROCEDURE — 76770 US EXAM ABDO BACK WALL COMP: CPT | Mod: TC,PN

## 2025-06-02 PROCEDURE — 99999 PR PBB SHADOW E&M-EST. PATIENT-LVL IV: CPT | Mod: PBBFAC,,, | Performed by: INTERNAL MEDICINE

## 2025-06-02 PROCEDURE — G2211 COMPLEX E/M VISIT ADD ON: HCPCS | Mod: S$GLB,,, | Performed by: INTERNAL MEDICINE

## 2025-06-02 PROCEDURE — 99204 OFFICE O/P NEW MOD 45 MIN: CPT | Mod: S$GLB,,, | Performed by: INTERNAL MEDICINE

## 2025-06-02 RX ORDER — PROMETHAZINE HYDROCHLORIDE AND DEXTROMETHORPHAN HYDROBROMIDE 6.25; 15 MG/5ML; MG/5ML
SYRUP ORAL
COMMUNITY
Start: 2025-01-27

## 2025-06-02 RX ORDER — PERFLUOROHEXYLOCTANE 1 MG/MG
SOLUTION OPHTHALMIC
COMMUNITY
Start: 2025-02-13

## 2025-06-02 RX ORDER — TIZANIDINE 4 MG/1
4 TABLET ORAL NIGHTLY
Qty: 30 TABLET | Refills: 5 | Status: SHIPPED | OUTPATIENT
Start: 2025-06-02

## 2025-06-03 ENCOUNTER — TELEPHONE (OUTPATIENT)
Dept: INTERNAL MEDICINE | Facility: CLINIC | Age: 65
End: 2025-06-03
Payer: COMMERCIAL

## 2025-06-03 DIAGNOSIS — N28.1 KIDNEY CYST, ACQUIRED: Primary | ICD-10-CM

## 2025-06-13 ENCOUNTER — OFFICE VISIT (OUTPATIENT)
Dept: PAIN MEDICINE | Facility: CLINIC | Age: 65
End: 2025-06-13
Payer: COMMERCIAL

## 2025-06-13 ENCOUNTER — OFFICE VISIT (OUTPATIENT)
Dept: GASTROENTEROLOGY | Facility: CLINIC | Age: 65
End: 2025-06-13
Attending: FAMILY MEDICINE
Payer: COMMERCIAL

## 2025-06-13 VITALS
HEIGHT: 69 IN | HEART RATE: 69 BPM | WEIGHT: 194.88 LBS | SYSTOLIC BLOOD PRESSURE: 160 MMHG | DIASTOLIC BLOOD PRESSURE: 96 MMHG | BODY MASS INDEX: 28.87 KG/M2

## 2025-06-13 VITALS
HEART RATE: 66 BPM | WEIGHT: 194 LBS | RESPIRATION RATE: 18 BRPM | OXYGEN SATURATION: 100 % | SYSTOLIC BLOOD PRESSURE: 134 MMHG | HEIGHT: 69 IN | TEMPERATURE: 98 F | BODY MASS INDEX: 28.73 KG/M2 | DIASTOLIC BLOOD PRESSURE: 90 MMHG

## 2025-06-13 DIAGNOSIS — M79.10 MYALGIA: ICD-10-CM

## 2025-06-13 DIAGNOSIS — R10.13 EPIGASTRIC PAIN: Primary | ICD-10-CM

## 2025-06-13 DIAGNOSIS — Z51.81 ENCOUNTER FOR MONITORING LONG-TERM PROTON PUMP INHIBITOR THERAPY: ICD-10-CM

## 2025-06-13 DIAGNOSIS — Z79.899 ENCOUNTER FOR MONITORING LONG-TERM PROTON PUMP INHIBITOR THERAPY: ICD-10-CM

## 2025-06-13 DIAGNOSIS — G89.29 CHRONIC RIGHT-SIDED LOW BACK PAIN WITHOUT SCIATICA: Primary | ICD-10-CM

## 2025-06-13 DIAGNOSIS — K21.9 GASTROESOPHAGEAL REFLUX DISEASE WITHOUT ESOPHAGITIS: ICD-10-CM

## 2025-06-13 DIAGNOSIS — N39.3 STRESS INCONTINENCE: ICD-10-CM

## 2025-06-13 DIAGNOSIS — M54.50 CHRONIC RIGHT-SIDED LOW BACK PAIN WITHOUT SCIATICA: Primary | ICD-10-CM

## 2025-06-13 PROCEDURE — 99999 PR PBB SHADOW E&M-EST. PATIENT-LVL IV: CPT | Mod: PBBFAC,,,

## 2025-06-13 PROCEDURE — 99999 PR PBB SHADOW E&M-EST. PATIENT-LVL V: CPT | Mod: PBBFAC,,, | Performed by: ANESTHESIOLOGY

## 2025-06-13 RX ORDER — SUCRALFATE 1 G/1
1 TABLET ORAL
Qty: 30 TABLET | Refills: 2 | Status: SHIPPED | OUTPATIENT
Start: 2025-06-13 | End: 2025-07-06

## 2025-06-13 RX ORDER — RABEPRAZOLE SODIUM 20 MG/1
20 TABLET, DELAYED RELEASE ORAL 2 TIMES DAILY
Qty: 60 TABLET | Refills: 11 | Status: SHIPPED | OUTPATIENT
Start: 2025-06-13 | End: 2025-06-13 | Stop reason: SDUPTHER

## 2025-06-13 RX ORDER — RABEPRAZOLE SODIUM 20 MG/1
20 TABLET, DELAYED RELEASE ORAL 2 TIMES DAILY
Qty: 60 TABLET | Refills: 11 | Status: SHIPPED | OUTPATIENT
Start: 2025-06-13 | End: 2026-06-13

## 2025-06-13 NOTE — PROGRESS NOTES
PCP: David Ya MD    REFERRING PHYSICIAN: David Ya MD    CHIEF COMPLAINT: right lower back pain    Original HISTORY OF PRESENT ILLNESS: Leschia T Bastian presents to the clinic for the evaluation of the above pain. The pain started 8-10 months.    Original Pain Description:  The pain is located in the right lower back and gluteal region and is axial in nature. She has had sciatica before and it does not feel like that. The pain is described as stabbing and tight band. Exacerbating factors: Laying, Bending, and Extension. The pain is worse during the night when she is sleeping or in bed. Mitigating factors nothing. Symptoms interfere with daily activity and sleeping. The patient feels like symptoms have been worsening. Patient denies night fever/night sweats, bowel incontinence, significant weight loss, significant motor weakness, and loss of sensations. She has had some worsening urinary urgency over the past 3-4 months.    Original PAIN SCORES:  Best: Pain is 5  Worst: Pain is 9  Current: Pain is 5        6/13/2025     1:09 PM   Last 3 PDI Scores   Pain Disability Index (PDI) 31       INTERVAL HISTORY: (Newest visit at the bottom)   Interval History (Date):       6 weeks of Conservative therapy:  PT: No (Must include dates)  Chiro: No  HEP: 2 sessions of 1 hour Line Dance lessons      Treatments / Medications: (Ice/Heat/NSAIDS/APAP/etc):  Methocarbamol (has not tried it yet)  Tizanidine (takes occasionally but makes her feel strange)      Interventional Pain Procedures: (Previous injections)  Distant prior back injection    Past Medical History:   Diagnosis Date    Acute on chronic cholecystitis 03/19/2014    Formatting of this note might be different from the original.   dx update   IMO Update  dx update   IMO April 2016      Allergy     Anemia 05/23/2024    Angioedema 05/23/2024    Atypical chest pain     Cervical radiculopathy     history of, no current problems    Constipation      COVID-19     General anesthetics causing adverse effect in therapeutic use     GERD (gastroesophageal reflux disease)     Hypertension     Insomnia     Kidney cyst, acquired 01/28/2021    Lumbar radiculopathy     JAYLON on CPAP     S/P ROLDAN-BSO 06/25/2018    Seasonal allergies     Shortness of breath     Urticaria, idiopathic 05/23/2024    Vitamin D deficiency      Past Surgical History:   Procedure Laterality Date    AXILLARY HIDRADENITIS EXCISION Bilateral     BUNIONECTOMY Bilateral     COLONOSCOPY      several    CYSTOSCOPY  06/22/2018    Procedure: CYSTOSCOPY;  Surgeon: Best Jefferson MD;  Location: [facility] OR;  Service: Urology;;    ESOPHAGOGASTRODUODENOSCOPY      x2    ESOPHAGOGASTRODUODENOSCOPY N/A 11/09/2023    Procedure: EGD (ESOPHAGOGASTRODUODENOSCOPY);  Surgeon: Lilly Lagos MD;  Location: [facility];  Service: Endoscopy;  Laterality: N/A;    GALLBLADDER SURGERY      HYSTERECTOMY      LAPAROSCOPIC TOTAL HYSTERECTOMY N/A 06/22/2018    Procedure: HYSTERECTOMY, TOTAL, LAPAROSCOPIC, ATTEMPTED;  Surgeon: Nidia Bradley MD;  Location: [facility] OR;  Service: OB/GYN;  Laterality: N/A;  attempted    LYSIS OF ADHESIONS OF URETER Right 06/22/2018    Procedure: URETEROLYSIS;  Surgeon: Best Jefferson MD;  Location: [facility] OR;  Service: Urology;  Laterality: Right;    TOTAL ABDOMINAL HYSTERECTOMY W/ BILATERAL SALPINGOOPHORECTOMY Bilateral 06/22/2018    TUBAL LIGATION      WISDOM TOOTH EXTRACTION       Social History[1]  Family History   Problem Relation Name Age of Onset    Breast cancer Mother  53    Cancer Mother          breast cancer    Hypertension Mother      Glaucoma Father      Hypertension Father      Hypertension Sister      Breast cancer Sister  52    Colon cancer Brother  46    Heart disease Maternal Grandfather      Heart disease Paternal Grandfather      Ovarian cancer Cousin mat second     Cancer Cousin mat second         ovarian    Breast cancer Maternal Cousin mat third cousin     Breast cancer  Maternal Cousin mat third     Eczema Daughter         Review of patient's allergies indicates:   Allergen Reactions    Clams      Allergy testing confirmed    Codeine Itching and Other (See Comments)    Scallops     Shrimp      Peeling shrimp, got a rash, allergy test confirmed       Current Outpatient Medications   Medication Sig    BIOTIN ORAL Take 1 capsule by mouth Daily.    cholecalciferol, vitamin D3, 1,250 mcg (50,000 unit) capsule Take 1 capsule (50,000 Units total) by mouth once a week. for 8 doses    EPINEPHrine 0.3 mg/0.3 mL Syrg Use as needed for anaphylaxis    pantoprazole (PROTONIX) 40 MG tablet Take 1 tablet (40 mg total) by mouth once daily.    tiZANidine (ZANAFLEX) 4 MG tablet Take 1 tablet (4 mg total) by mouth nightly.    valsartan-hydrochlorothiazide (DIOVAN-HCT) 160-12.5 mg per tablet Take 1 tablet by mouth once daily.    ergocalciferol (VITAMIN D2) 50,000 unit Cap Take 50,000 Units by mouth every 7 days. (Patient not taking: Reported on 6/13/2025)    fluticasone propionate (FLONASE) 50 mcg/actuation nasal spray 2 sprays by Each Nostril route. (Patient not taking: Reported on 6/13/2025)    linaCLOtide (LINZESS) 72 mcg Cap capsule Take 1 capsule (72 mcg total) by mouth once daily. (Patient not taking: Reported on 6/13/2025)    methocarbamoL (ROBAXIN) 500 MG Tab Take 1 tablet (500 mg total) by mouth 3 (three) times daily as needed (lower back pain). (Patient not taking: Reported on 5/29/2025)    GEOVANY MAHMOOD, 100 % Drop  (Patient not taking: Reported on 6/13/2025)    promethazine-dextromethorphan (PROMETHAZINE-DM) 6.25-15 mg/5 mL Syrp Take by mouth. (Patient not taking: Reported on 6/13/2025)     No current facility-administered medications for this visit.       ROS:  GENERAL: No fever. No chills. No fatigue. Denies weight loss. Denies weight gain.  HEENT: Denies headaches. Denies vision change. Denies eye pain. Denies double vision. Denies ear pain.   CV: Denies chest pain.   PULM: Denies of  "shortness of breath.  GI: Denies constipation. No diarrhea. No abdominal pain. Denies nausea. Denies vomiting. No blood in stool.  HEME: Denies bleeding problems.  : Denies urgency. No painful urination. No blood in urine.  MS: Denies joint stiffness. Denies joint swelling.  Denies back pain.  SKIN: Denies rash.   NEURO: Denies seizures. No weakness.  PSYCH:  Denies difficulty sleeping. No anxiety. Denies depression. No suicidal thoughts.       VITALS:   Vitals:    06/13/25 1314   BP: (!) 134/90   Pulse: 66   Resp: 18   Temp: 98 °F (36.7 °C)   TempSrc: Oral   SpO2: 100%   Weight: 88 kg (194 lb 0.1 oz)   Height: 5' 9" (1.753 m)   PainSc:   5   PainLoc: Shoulder         PHYSICAL EXAM:   GENERAL: Well appearing, in no acute distress, alert and oriented x3.  PSYCH:  Mood and affect appropriate.  SKIN: Skin color, texture, turgor normal, no rashes or lesions.  HEENT:  Normocephalic, atraumatic. Cranial nerves grossly intact.  NECK: No pain to palpation over the cervical paraspinous muscles. No pain to palpation over facets. No pain with neck flexion, extension, or lateral flexion.   PULM: No evidence of respiratory difficulty, symmetric chest rise.  GI:  Non-distended  BACK: Normal range of motion. No pain to palpation over the spinous processes. No pain to palpation over facet joints. There is pain with palpation over the right sacroiliac joint. Right sided positive Orville's test, compression and distraction tests. When patient is asked where pain is she points to the SI joint on the R side.  EXTREMITIES: No deformities, edema, or skin discoloration.   MUSCULOSKELETAL: Shoulder, hip, and knee provocative maneuvers are negative. No atrophy is noted. Right GTB tenderness.   NEURO: Sensation is equal and appropriate bilaterally. Bilateral upper and lower extremity strength is normal and symmetric. Bilateral upper and lower extremity coordination and muscle stretch reflexes are physiologic and symmetric. Plantar response " are downgoing. Straight leg raising in the supine position is negative to radicular pain.   GAIT: normal.      LABS:      IMAGING:    No Spine imaging    ASSESSMENT: 64 y.o. year old female with pain, consistent with:    Encounter Diagnoses   Name Primary?    Myalgia     Chronic right-sided low back pain without sciatica Yes       DISCUSSION: Leschia T Bastian is a 64-year-old woman who comes to us with right lower back pain which is worst with laying down and sleeping. No spine imaging available. Exam shows pain reproduced with palpation of the Right SI joint and she has positive provacative maneuvers suggestive of Right sided sacroiliitis. Exam also consistent pain in the R GTB and piriformis area.      PLAN:  Obtain Lumbar X-rays with Flex and extension  Obtain Sacrum and Coccyx X-ray  Place referral to physical therapy  Can continue current medications for management as they are not causing negative side effects: (Tizanidine, methocarbamol).  RTC in 8 weeks to discuss possible injections if no improvement from physical therapy      I would like to thank David Ya MD for the opportunity to assist in the care of this patient. We had a very nice visit and I look forward to continuing their care. Please let me know if I can be of further assistance.     Judson Jackman  06/13/2025           [1]   Social History  Socioeconomic History    Marital status:     Number of children: 2   Tobacco Use    Smoking status: Never    Smokeless tobacco: Never   Substance and Sexual Activity    Alcohol use: Yes     Comment: occasional daiquiri    Drug use: Never    Sexual activity: Yes     Partners: Male   Social History Narrative    Patient is originally from [place] at ; [employer] ; [place]         Working ;  at [employer]         //e            Children    vionne -girl     Tanner - boy        Lives with     Mother, father            Diet/Exericse         Social  Drivers of Health     Financial Resource Strain: Medium Risk (6/2/2025)    Overall Financial Resource Strain (CARDIA)     Difficulty of Paying Living Expenses: Somewhat hard   Food Insecurity: No Food Insecurity (6/2/2025)    Hunger Vital Sign     Worried About Running Out of Food in the Last Year: Never true     Ran Out of Food in the Last Year: Never true   Transportation Needs: No Transportation Needs (6/2/2025)    PRAPARE - Transportation     Lack of Transportation (Medical): No     Lack of Transportation (Non-Medical): No   Physical Activity: Insufficiently Active (6/2/2025)    Exercise Vital Sign     Days of Exercise per Week: 2 days     Minutes of Exercise per Session: 60 min   Stress: No Stress Concern Present (6/2/2025)    [employer] [place] [employer] - Occupational Stress Questionnaire     Feeling of Stress : Only a little   Housing Stability: Low Risk  (6/2/2025)    Housing Stability Vital Sign     Unable to Pay for Housing in the Last Year: No     Homeless in the Last Year: No

## 2025-06-13 NOTE — PROGRESS NOTES
2024 with CLAUDIO Mirza, NP -10+ yr. Reflux intermittently, was taking omeprazole in the past now taking OTC Nexium- still with symptoms. She also reports intermittent epigastric burning and nausea- occurs atleast once weekly.      Last Visit with Dr. Lagos in 2023:  64 yo F here for evaluation of GERD and constipation.  She has seen several GI providers in the past including [facility], [facility], and [facility].  She was last seen at [facility] and was on Linzess but then her doctor retired (Charles).  She was given samples and thus it was difficult to use the Linzess daily.  Using it 3 times per week gave her hard pebble-like stools.  She has been on PPI for years, taking 9-10 am daily.  She has a burning sensation in her upper belly, that feels similar to her previous gallbladder issues, despite taking the PPI.     She has had several colonoscopies, and they were yearly for a bit due to polyps.  Her brother had colon cancer at the age of 47.     Prior Endoscopy:  EGD: 2023 with Dr. Lagos:   - Normal esophagus.                - Erythematous mucosa in the antrum. Biopsied.                - Normal examined duodenum.                - 2 cm hiatal hernia. This just causes reflux.      Final Pathologic Diagnosis Antrum, biopsy:  Gastric mucosa with mild chronic gastritis  No Helicobacter pylori organisms identified on routine stain      Gastroenterology Clinic Consultation Note    Patient ID: Leschia T Bastian is a 64 y.o. female.    Chief Complaint: Gastroesophageal Reflux    History of Present Illness    CHIEF COMPLAINT:  Patient presents today for GERD    GERD AND RELATED SYMPTOMS:  She experienced an acid reflux episode en route to appointment with burning sensation in throat, nostrils, and ear. She has had previous emergency room visits for severe GERD symptoms that were mistaken for heart attack. She sleeps with head elevated due to inability to lie flat and waits 1-2 hours after eating before lying down.    MEDICATIONS:  She has  alternated between pantoprazole and omeprazole every few months, with pantoprazole being the primary medication in recent years. She has used Carafate as needed. Recent trial of OTC Nexium was ineffective even with two doses. She currently uses Mylanta for symptom relief with reported benefit.    MEDICAL HISTORY:  Endoscopy 2 years ago showed hiatal hernia and chronic gastritis. History of cholecystectomy following multiple ER visits for severe abdominal pain.    URINARY SYMPTOMS:  She reports urinary incontinence with difficulty holding urine. She experiences stress incontinence, particularly with sneezing, and takes measures to prevent sneezing to avoid urine leakage.      ROS:  General: -fever, -chills, -fatigue, -weight gain, -weight loss, -sleep disturbances  Eyes: -vision changes, -redness, -discharge  ENT: -ear pain, -nasal congestion, -sore throat  Cardiovascular: -chest pain, -palpitations, -lower extremity edema  Respiratory: -cough, -shortness of breath  Gastrointestinal: -abdominal pain, -nausea, -vomiting, -diarrhea, -constipation, -blood in stool, +indigestion, +heartburn  Genitourinary: -dysuria, -hematuria, +frequency  Musculoskeletal: -joint pain, -muscle pain  Skin: -rash, -lesion  Neurological: -headache, -dizziness, -numbness, -tingling  Psychiatric: -anxiety, -depression, -sleep difficulty  Female Genitourinary: +urinary incontinence         Physical Exam  Vitals and nursing note reviewed.   Constitutional:       General: She is not in acute distress.     Appearance: Normal appearance. She is not ill-appearing.   HENT:      Head: Normocephalic and atraumatic.      Right Ear: External ear normal.      Left Ear: External ear normal.      Nose: Nose normal.   Eyes:      General: No scleral icterus.     Extraocular Movements: Extraocular movements intact.   Cardiovascular:      Rate and Rhythm: Normal rate.   Pulmonary:      Effort: Pulmonary effort is normal. No respiratory distress.   Abdominal:       General: There is no distension.      Palpations: Abdomen is soft.      Tenderness: There is no guarding.   Musculoskeletal:         General: Normal range of motion.      Cervical back: Normal range of motion.   Skin:     General: Skin is warm.   Neurological:      Mental Status: She is alert and oriented to person, place, and time.   Psychiatric:         Mood and Affect: Mood normal.         Behavior: Behavior is cooperative.         Thought Content: Thought content normal.         Medical History:  has a past medical history of Acute on chronic cholecystitis (03/19/2014), Allergy, Anemia (05/23/2024), Angioedema (05/23/2024), Atypical chest pain, Cervical radiculopathy, Constipation, COVID-19, General anesthetics causing adverse effect in therapeutic use, GERD (gastroesophageal reflux disease), Hypertension, Insomnia, Kidney cyst, acquired (01/28/2021), Lumbar radiculopathy, JAYLON on CPAP, S/P ROLDAN-BSO (06/25/2018), Seasonal allergies, Shortness of breath, Urticaria, idiopathic (05/23/2024), and Vitamin D deficiency.    Surgical History:  has a past surgical history that includes Tubal ligation; Gallbladder surgery; Bunionectomy (Bilateral); Axillary hidradenitis excision (Bilateral); Laparoscopic total hysterectomy (N/A, 06/22/2018); Cystoscopy (06/22/2018); Lysis of adhesions of ureter (Right, 06/22/2018); Total abdominal hysterectomy w/ bilateral salpingoophorectomy (Bilateral, 06/22/2018); [place] tooth extraction; Esophagogastroduodenoscopy; Colonoscopy; Esophagogastroduodenoscopy (N/A, 11/09/2023); and Hysterectomy.    Family History: family history includes Breast cancer in her maternal cousin and maternal cousin; Breast cancer (age of onset: 52) in her sister; Breast cancer (age of onset: 53) in her mother; Cancer in her cousin and mother; Colon cancer (age of onset: 46) in her brother; Eczema in her daughter; Glaucoma in her father; Heart disease in her maternal grandfather and paternal grandfather;  "Hypertension in her father, mother, and sister; Ovarian cancer in her cousin..       Review of patient's allergies indicates:   Allergen Reactions    Clams      Allergy testing confirmed    Codeine Itching and Other (See Comments)    Scallops     Shrimp      Peeling shrimp, got a rash, allergy test confirmed       Medications Ordered Prior to Encounter[1]    Labs:  Lab Results   Component Value Date    WBC 3.31 (L) 05/29/2025    HGB 12.2 05/29/2025    HCT 41.8 05/29/2025     05/29/2025    CRP 5.6 05/29/2025    CHOL 170 05/29/2025    TRIG 98 05/29/2025    HDL 44 05/29/2025    ALKPHOS 108 05/29/2025    ALT 59 (H) 05/29/2025    AST 30 05/29/2025     05/29/2025    K 4.2 05/29/2025     05/29/2025    CREATININE 0.8 05/29/2025    BUN 13 05/29/2025    CO2 27 05/29/2025    TSH 1.144 05/29/2025    INR 1.0 01/15/2021    HGBA1C 5.5 05/23/2024       Vital Signs:  BP (!) 160/96   Pulse 69   Ht 5' 9" (1.753 m)   Wt 88.4 kg (194 lb 14.2 oz)   LMP 05/01/2018   BMI 28.78 kg/m²   Body mass index is 28.78 kg/m².    Imaging reviewed:   US RETROPERITONEAL COMPLETE     CLINICAL HISTORY:  Cyst of kidney, acquired     TECHNIQUE:  Ultrasound of the kidneys and urinary bladder was performed including color flow and Doppler evaluation of the kidneys.     COMPARISON:  CT abdomen and pelvis July 19, 2021.     FINDINGS:  Right kidney: The right kidney measures 10.1 cm. No cortical thinning.  Resistive index measures 0.61.  No mass. No renal stone. No hydronephrosis.     Left kidney: The left kidney measures 10.1 cm. No cortical thinning.  Resistive index measures 0.63.  Benign 1.8 cm left renal cyst.  No mass. No renal stone. No hydronephrosis.     The bladder is partially distended at the time of scanning and has an unremarkable appearance.     Incidental note is made of hepatic steatosis.     Impression:     Benign 1.8 cm left renal cyst.     Hepatic steatosis.        Electronically signed by:Maximiliano Terry  Date:        "                                     06/02/2025        Upper GI and small bowel follow-through examination    Clinical history: Right-sided abdominal pain.    Technique: After a  radiograph was obtained, effervescent crystals were administered.  Thick barium was then administered to the patient in the upright ROCKWELL position and multiple spot fluoroscopic images of the upper gastrointestinal tract were obtained.  The table was then adjusted to a horizontal position and the patient was instructed to perform a 360 degree turn for purposes of coating the stomach.  Additional spot fluoroscopic images of the stomach and duodenum were then obtained.  The patient was then positioned in the prone oblique right-sided down position and additional thin barium was administered and spot fluoroscopic images of the esophagus were obtained.  The table was then returned to an upright position and additional spot fluoroscopic images of the fundus of the stomach and gastric antrum were obtained.  Conventional overhead radiographs of the abdomen were then obtained following the passage of contrast through the entirety of the small bowel and additional spot  fluoroscopic images were obtained.  Total fluoroscopy time was 3 minutes and 54 seconds, and 33 spot fluoroscopic were archived.    Findings: The  radiograph revealed some stool throughout the colon without evidence of intestinal obstruction.    The esophageal mucosal fold pattern appeared normal without evidence of esophagitis.  No intrinsic filling defects or areas of extrinsic compression were noted.  The gastroesophageal junction was widely patent.  No esophageal stricture or dilatation was identified.    No gastric fold thickening was seen.  No abnormal outpouching of contrast from the lumen of the stomach was identified and no foci of ulceration were seen.  Air contrast views of the pylorus were normal.  No pyloric scarring was identified.    The swallowing mechanism  appeared normal with normal esophageal peristalsis. A small sliding-type hiatus hernia was identified.    There was normal transit time with contrast reaching the cecum at approximately 50 minutes.  The small bowel mucosal fold pattern appeared normal.  No intrinsic filling defects or areas of extrinsic compression were seen.  No small bowel dilatation or stricture was identified.  The terminal ileum appear normal.  Exam End: 10/25/21 09:07         Endoscopy reviewed:   Findings:       The examined esophagus was normal.        Mildly erythematous mucosa was found in the gastric antrum. Biopsies        were taken with a cold forceps for histology. Verification of        patient identification for the specimen was done by the physician,        nurse and technician. Estimated blood loss was minimal.        The examined duodenum was normal.        A 2 cm hiatal hernia was present.   Impression:            - Normal esophagus.                          - Erythematous mucosa in the antrum. Biopsied.                          - Normal examined duodenum.                          - 2 cm hiatal hernia. This just causes reflux.   Recommendation:        - Discharge patient to home.                          - Patient has a contact number available for                          emergencies. The signs and symptoms of potential                          delayed complications were discussed with the                          patient. Return to normal activities tomorrow.                          Written discharge instructions were provided to                          the patient.                          - Resume previous diet.                          - Continue present medications including daily PPI.                          - Await pathology results.                          - Return to GI clinic PRN.   MD Lilly Pete MD   11/9/2023 2:21:32 PM     Findings:       The perianal and digital rectal examinations  "were normal.        A few small-mouthed diverticula were found in the sigmoid colon.        The exam was otherwise without abnormality.   Impression:           - Diverticulosis in the sigmoid colon.                         - The examination was otherwise normal.                         - No specimens collected.   Recommendation:       - Discharge patient to home (ambulatory).                         - High fiber diet daily.                         - Repeat colonoscopy in 5 years for surveillance.   Attending Participation:        I was present from insertion to withdraw and performed procedure        with the fellow.   Markus Bardales MD   7/21/2015 11:59:39 AM       Assessment:  1. Epigastric pain    2. Encounter for monitoring long-term proton pump inhibitor therapy    3. Gastroesophageal reflux disease without esophagitis      Orders Placed This Encounter    RABEprazole (ACIPHEX) 20 mg tablet    (Magic mouthwash) 1:1:1 diphenhydrAMINE(Benadryl) 12.5mg/5ml liq, aluminum & magnesium hydroxide-simethicone (Maalox), LIDOcaine viscous 2%    sucralfate (CARAFATE) 1 gram tablet       Assessment & Plan      GASTROESOPHAGEAL REFLUX DISEASE (GERD):  - Assessed ongoing GERD symptoms, noting inadequate control with OTC Nexium.  - Considered previous use of pantoprazole, omeprazole, and carafate for GERD management.  - Continued Carafate before meals for mucosal protection, explaining its mechanism as a protective coating similar to olive oil.  - Started Aciphex, a stronger proton pump inhibitor, for better symptom control.  - Started "magic mouthwash" (GI cocktail) as needed at night for nighttime relief.    HIATAL HERNIA:  - Reviewed history of hiatal hernia and chronic gastritis, contributing to reflux symptoms.  - Patient to avoid eating within 3 hours of lying down to prevent acid reflux, especially with a hiatal hernia.  - Patient to continue sleeping with head elevated.    CHRONIC GASTRITIS:  - Reviewed history of hiatal " hernia and chronic gastritis, contributing to reflux symptoms.    URINARY INCONTINENCE:  - follow with gyn for further workup  - offered to refer to Urology for evaluation of urinary incontinence - deferred      FOLLOW-UP   Next colonoscopy due in 2026      STEVENSON PIERRE  Gastroenterology Department  [facility] Office [phone]     This note was generated with the assistance of ambient listening technology. Verbal consent was obtained by the patient and accompanying visitor(s) for the recording of patient appointment to facilitate this note. I attest to having reviewed and edited the generated note for accuracy, though some syntax or spelling errors may persist. Please contact the author of this note for any clarification.                      [1]   Current Outpatient Medications on File Prior to Visit   Medication Sig Dispense Refill    BIOTIN ORAL Take 1 capsule by mouth Daily.      cholecalciferol, vitamin D3, 1,250 mcg (50,000 unit) capsule Take 1 capsule (50,000 Units total) by mouth once a week. for 8 doses 8 capsule 0    EPINEPHrine 0.3 mg/0.3 mL Syrg Use as needed for anaphylaxis 1 each 2    tiZANidine (ZANAFLEX) 4 MG tablet Take 1 tablet (4 mg total) by mouth nightly. 30 tablet 5    valsartan-hydrochlorothiazide (DIOVAN-HCT) 160-12.5 mg per tablet Take 1 tablet by mouth once daily. 90 tablet 3    [DISCONTINUED] pantoprazole (PROTONIX) 40 MG tablet Take 1 tablet (40 mg total) by mouth once daily. 90 tablet 0    ergocalciferol (VITAMIN D2) 50,000 unit Cap Take 50,000 Units by mouth every 7 days. (Patient not taking: Reported on 6/2/2025)      fluticasone propionate (FLONASE) 50 mcg/actuation nasal spray 2 sprays by Each Nostril route. (Patient not taking: Reported on 6/13/2025)      linaCLOtide (LINZESS) 72 mcg Cap capsule Take 1 capsule (72 mcg total) by mouth once daily. (Patient not taking: Reported on 6/13/2025) 30 capsule 11    methocarbamoL (ROBAXIN) 500 MG Tab Take  1 tablet (500 mg total) by mouth 3 (three) times daily as needed (lower back pain). (Patient not taking: Reported on 6/13/2025) 30 tablet 0    GEOVANY MAHMOOD, 100 % Drop  (Patient not taking: Reported on 6/13/2025)      promethazine-dextromethorphan (PROMETHAZINE-DM) 6.25-15 mg/5 mL Syrp Take by mouth. (Patient not taking: Reported on 6/13/2025)       No current facility-administered medications on file prior to visit.

## 2025-07-08 ENCOUNTER — CLINICAL SUPPORT (OUTPATIENT)
Dept: REHABILITATION | Facility: HOSPITAL | Age: 65
End: 2025-07-08
Attending: ANESTHESIOLOGY
Payer: COMMERCIAL

## 2025-07-08 DIAGNOSIS — R26.89 DECREASED FUNCTIONAL MOBILITY: Primary | ICD-10-CM

## 2025-07-08 DIAGNOSIS — R26.2 DIFFICULTY WALKING: ICD-10-CM

## 2025-07-08 DIAGNOSIS — M62.81 WEAKNESS OF TRUNK MUSCULATURE: ICD-10-CM

## 2025-07-08 PROCEDURE — 97110 THERAPEUTIC EXERCISES: CPT | Mod: PO

## 2025-07-08 PROCEDURE — 97162 PT EVAL MOD COMPLEX 30 MIN: CPT | Mod: PO

## 2025-07-09 PROBLEM — R26.89 DECREASED FUNCTIONAL MOBILITY: Status: ACTIVE | Noted: 2025-07-09

## 2025-07-09 PROBLEM — M62.81 WEAKNESS OF TRUNK MUSCULATURE: Status: ACTIVE | Noted: 2025-07-09

## 2025-07-09 PROBLEM — R26.2 DIFFICULTY WALKING: Status: ACTIVE | Noted: 2025-07-09

## 2025-07-09 NOTE — PROGRESS NOTES
Outpatient Rehab    Physical Therapy Evaluation    Patient Name: Leschia T Bastian  MRN: [id number]  YOB: 1960  Encounter Date: 7/8/2025    Therapy Diagnosis:   Encounter Diagnoses   Name Primary?    Weakness of trunk musculature     Difficulty walking     Decreased functional mobility Yes     Physician: Cassi Enriquez MD    Physician Orders: Eval and Treat  Medical Diagnosis: Chronic right-sided low back pain without sciatica  Surgical Diagnosis: Not applicable for this Episode   Surgical Date: Not applicable for this Episode  Days Since Last Surgery: Not applicable for this Episode    Visit # / Visits Authorized:  1 / 1  Insurance Authorization Period: 6/13/2025 to 12/31/2025  Date of Evaluation: 7/8/2025  Plan of Care Certification: 7/8/2025 to 9/5/25     Time In: 1608   Time Out: 1700  Total Time (in minutes): 52   Total Billable Time (in minutes): 52    Intake Outcome Measure for FOTO Survey    Therapist reviewed FOTO scores for Leschia T Bastian on 7/8/2025.   FOTO report - see Media section or FOTO account episode details.     Intake Score:  %    Precautions:       Subjective   History of Present Illness  Leschia is a 64 y.o. female who reports to physical therapy with a chief concern of Low back pain.     The patient reports a medical diagnosis of Chronic right-sided low back pain without sciatica (M54.50, G89.29).            History of Present Condition/Illness: Pt reports chronic low back pain started over a year ago insidiously. Pain has gradually gotten worse over time as she has tried to go about her usual activities. States she has history of LBP with sciatica, but current condition feels different than that one. Pain is in central lower lumbar and wraps around the right side of her back and hip. She occasionally has pain in her R hip/thigh. She also reports pain in feet and calves, but believes it is unrelated to the back. Reports feelings of heaviness in the legs and cramping in feet  and calves. Pain is increased with standing, walking, getting out of bed, rolling over, squatting, STS. Increased pain in morning that lessens after moving around, but increases again at work. Still present when using standing desk. 20-30 minutes of walking or standing aggravates symptoms. Goes to line dancing class that is an hour long, but can usually only participate for 40-55 minutes. Mild n/t at night. Denies saddle paresthesias, b/b incontinence.     Activities of Daily Living  Social history was obtained from Patient.    General Prior Level of Function Comments: Independent with no pain  General Current Level of Function Comments: Pain and difficulty with sleeping, transfers, bending, standing and walking  Patient Responsibilities: Community mobility, Driving, Financial management, Health management, Home management, Laundry, Meal prep, Personal ADL, Shopping, Yard work    Previously independent with activities of daily living? Yes     Currently independent with activities of daily living? No  Activities currently needing assistance include Bed mobility, Functional mobility, and Transfers.        Previously independent with instrumental activities of daily living? Yes     Currently independent with instrumental activities of daily living? No  Activities currently needing assistance include: Community mobility, Grocery/shopping, and Home establishment and management.            Pain     Patient reports a current pain level of 7/10. Pain at best is reported as 5/10. Pain at worst is reported as 8/10.   Location: Middle of lower lumbar, wrapping around to R side  Clinical Progression (since onset): Worsening  Pain Qualities: Aching, Tightness  Pain-Relieving Factors: Heat, Rest, Medications - over-the-counter, Medications - prescription  Pain-Aggravating Factors: Bending, Computer work, Exercise, Holding objects, Lifting, Movement, Sitting, Squatting, Standing, Walking         Treatment  History  Treatments  Previously Received Treatments: Yes  Previous Treatments: Medications - over-the-counter, Medications - prescription, Heat      Past Medical History/Physical Systems Review:   Leschia T Bastian  has a past medical history of Acute on chronic cholecystitis, Allergy, Anemia, Angioedema, Atypical chest pain, Cervical radiculopathy, Constipation, COVID-19, General anesthetics causing adverse effect in therapeutic use, GERD (gastroesophageal reflux disease), Hypertension, Insomnia, Kidney cyst, acquired, Lumbar radiculopathy, JAYLON on CPAP, S/P ROLDAN-BSO, Seasonal allergies, Shortness of breath, Urticaria, idiopathic, and Vitamin D deficiency.    Leschia T Bastian  has a past surgical history that includes Tubal ligation; Gallbladder surgery; Bunionectomy (Bilateral); Axillary hidradenitis excision (Bilateral); Laparoscopic total hysterectomy (N/A, 06/22/2018); Cystoscopy (06/22/2018); Lysis of adhesions of ureter (Right, 06/22/2018); Total abdominal hysterectomy w/ bilateral salpingoophorectomy (Bilateral, 06/22/2018); [place] tooth extraction; Esophagogastroduodenoscopy; Colonoscopy; Esophagogastroduodenoscopy (N/A, 11/09/2023); and Hysterectomy.    Leschia has a current medication list which includes the following prescription(s): diphenhydramine hcl, biotin, cholecalciferol (vitamin d3), epinephrine, ergocalciferol, fluticasone propionate, linaclotide, methocarbamol, miebo (pf), promethazine-dextromethorphan, rabeprazole, tizanidine, and valsartan-hydrochlorothiazide.    Review of patient's allergies indicates:   Allergen Reactions    Clams      Allergy testing confirmed    Codeine Itching and Other (See Comments)    Scallops     Shrimp      Peeling shrimp, got a rash, allergy test confirmed        Objective      Lower Extremity Sensation  Right Lumbar/Lower Extremity Sensation  Intact: Light Touch       Left Lumbar/Lower Extremity Sensation  Intact: Light Touch                Right Lower Extremity  Reflexes  Patellar, L4: Normal (2+)         Achilles, S1: Normal (2+)         Left Lower Extremity Reflexes  Patellar, L4: Normal (2+)          Achilles, S1: Normal (2+)             Spinal Mobility  Hypomobile: Lumbosacral  Lumbosacral Mobility Details: L3-L5 hypomobile and painful R UPA, improved with grade 1-2 PA mobs    Spinal Muscle Palpation     Mod TTP Gmed, Gmax and piriformis improved with STM                Lumbar Range of Motion   Active (deg) Passive (deg) Pain   Flexion 70   Yes   Extension 50   Yes   Right Lateral Flexion 50   Yes   Right Rotation 80       Left Lateral Flexion 50       Left Rotation 80         Measured in % ; no impact w/ RMT      Hip Range of Motion    WNL w/ PROM, mild p! W/ R hip flx              Hip Strength - Planes of Motion   Right Strength Right Pain Left Strength Left  Pain   Flexion (L2) 4   4+     Extension 4 Yes 4     ABduction 4   4     ADduction 4   4     Internal Rotation 4   4     External Rotation 4   4+         Knee Strength   Right Strength Right Pain Left Strength Left  Pain   Flexion (S2) 4   4     Prone Flexion           Extension (L3) 4+   4+                Lumbar/Pelvic Girdle Special Tests       Lumbar Tests - SLR and Tension  Positive: Right Passive Straight Leg Raise                 Pelvic Girdle / Sacrum Tests  Negative: Right YVETTE, Left YVETTE, Right FADIR, Left FADIR, Right Sacral Spring, Left Sacral Spring, Right Sacroiliac Compression, and Left Sacroiliac Compression  Negative: Right Thigh Thrust/Posterior Shear and Left Thigh Thrust/Posterior Shear         Hip Special Tests  Intra-Articular/Impingement Tests  Negative: Right YVETTE, Left YVETTE, Right FADIR, and Left FADIR  Sacroiliac Joint Tests  Negative: Right Compression, Left Compression, Right Thigh Thrust/Posterior Shear, and Left Thigh Thrust/Posterior Shear           Transfers/Bed Mobility Details  Stiff and guarded trunk movements with supine sit and rolling, decreased trunk flx strength evident  w/ supine sit; valsalva noted      Sit to Stand Testing  The patient completed 5 sit to stand transfers in 33 sec. no UE use, 9/10 pain              Gait Analysis  Gait Pattern: Antalgic                   Treatment:  Therapeutic Exercise  TE 1: Pt ed: proper breathing during transfers, adequate hydration, POC, HEP, interpretation of assessments  TE 2: HEP: 90/90 nerve glides, LTR      Time Entry(in minutes):  PT Evaluation (Moderate) Time Entry: 40  Therapeutic Exercise Time Entry: 12    Assessment & Plan   Assessment  Leschia presents with a condition of Moderate complexity.   Presentation of Symptoms: Stable       Functional Limitations: Activity tolerance, Ambulating on uneven surfaces, Carrying objects, Completing self-care activities, Completing work/school activities, Decreased ambulation distance/endurance, Functional mobility, Gait limitations, Getting off the floor, Pain with ADLs/IADLs, Painful locomotion/ambulation, Participating in leisure activities, Performing household chores, Range of motion, Squatting, Standing tolerance, Transfers  Impairments: Abnormal gait, Abnormal or restricted range of motion, Activity intolerance, Impaired physical strength, Lack of appropriate home exercise program, Pain with functional activity  Personal Factors Affecting Prognosis: Pain    Patient Goal for Therapy (PT): Be able to move normally with no pain  Prognosis: Fair  Assessment Details: Pt is a 65 y/o female presenting to PT with referral for right sided low back pain. Pt demonstrates increased levels of pain, decreased lumbar ROM, decreased B hip and trunk strength, decreased lower lumbar joint mobility, TTP R gluteal musculature, increased neural tension, antalgic gait pattern, decreased activity tolerance, impaired functional mobility and lack of appropriate HEP. These impairments prevent pt from being able to perform transfers, bed mobility, ADLs, walking/standing, or participation in hobbies such as line  dancing class w/o increased pain and difficulty. Pt will benefit from the use of skilled PT to improve these impairments in order to maximize functional mobility and allow pt to return to pain free PLOF.      Plan  From a physical therapy perspective, the patient would benefit from: Skilled Rehab Services    Planned therapy interventions include: Therapeutic exercise, Therapeutic activities, Neuromuscular re-education, Manual therapy, ADLs/IADLs, and Gait training.    Planned modalities to include: Biofeedback, Cryotherapy (cold pack), Electrical stimulation - attended, Electrical stimulation - passive/unattended, and Thermotherapy (hot pack).        Visit Frequency: 2 times Per Week for 8 Weeks.       This plan was discussed with Patient.   Discussion participants: Agreed Upon Plan of Care             The patient's spiritual, cultural, and educational needs were considered, and the patient is agreeable to the plan of care and goals.           Goals:   Active       Ambulation/movement       Patient will tolerate walking 60+ minutes to demonstrate improved activity tolerance       Start:  07/09/25    Expected End:  09/05/25               Changing body position       Patient will demonstrate moving supine to sit no pain and w/o holding breath       Start:  07/09/25    Expected End:  09/05/25            Patient will perform 5xSTS in 20s or less w/o UE use to demonstrate improved functional mobility.        Start:  07/09/25    Expected End:  09/05/25               Functional outcome       Patient stated goal: Be able to move normally with no pain        Start:  07/09/25    Expected End:  09/05/25            Patient will demonstrate independence in home program for support of progression       Start:  07/09/25    Expected End:  08/08/25               Leisure activities       Patient will participate in line dancing class for entirety of session with no breaks        Start:  07/09/25    Expected End:  09/05/25                Pain       Patient will report pain of 5/10 at worst, demonstrating a reduction of overall pain       Start:  07/09/25    Expected End:  08/08/25            Patient will report a 2 point reduction in pain while performing STS transfer       Start:  07/09/25    Expected End:  08/08/25               Range of Motion       Patient will achieve spinal flexion to 90% w/ 2/10 pain or less       Start:  07/09/25    Expected End:  09/05/25            Patient will achieve spinal extension to 80% w/ 2/10 pain or less       Start:  07/09/25    Expected End:  09/05/25               Strength       Patient will achieve bilateral hip extension strength of 4+/5       Start:  07/09/25    Expected End:  09/05/25            Patient will achieve bilateral hip internal rotation strength of 4+/5 in 90 degrees hip flexion       Start:  07/09/25    Expected End:  09/05/25            Patient will achieve bilateral knee flexion strength of 5/5       Start:  07/09/25    Expected End:  09/05/25                Kingsley Bullard PT

## 2025-07-14 ENCOUNTER — CLINICAL SUPPORT (OUTPATIENT)
Dept: REHABILITATION | Facility: HOSPITAL | Age: 65
End: 2025-07-14
Payer: COMMERCIAL

## 2025-07-14 DIAGNOSIS — M62.81 WEAKNESS OF TRUNK MUSCULATURE: Primary | ICD-10-CM

## 2025-07-14 DIAGNOSIS — R26.2 DIFFICULTY WALKING: ICD-10-CM

## 2025-07-14 DIAGNOSIS — R26.89 DECREASED FUNCTIONAL MOBILITY: ICD-10-CM

## 2025-07-14 PROCEDURE — 97140 MANUAL THERAPY 1/> REGIONS: CPT | Mod: PO

## 2025-07-14 PROCEDURE — 97110 THERAPEUTIC EXERCISES: CPT | Mod: PO

## 2025-07-14 PROCEDURE — 97112 NEUROMUSCULAR REEDUCATION: CPT | Mod: PO

## 2025-07-14 NOTE — PROGRESS NOTES
"  Outpatient Rehab    Physical Therapy Visit    Patient Name: Leschia T Bastian  MRN: [id number]  YOB: 1960  Encounter Date: 7/14/2025    Therapy Diagnosis:   Encounter Diagnoses   Name Primary?    Weakness of trunk musculature Yes    Decreased functional mobility     Difficulty walking      Physician: Cassi Enriquez MD    Physician Orders: Eval and Treat  Medical Diagnosis: Chronic right-sided low back pain without sciatica  Surgical Diagnosis: Not applicable for this Episode   Surgical Date: Not applicable for this Episode  Days Since Last Surgery: Not applicable for this Episode    Visit # / Visits Authorized:  1 / 20  Insurance Authorization Period: 7/8/2025 to 12/31/2025  Date of Evaluation: 7/8/2025  Plan of Care Certification: 7/9/2025 to 9/5/2025      PT/PTA: PT   Number of PTA visits since last PT visit:0  Time In: 1701   Time Out: 1756  Total Time (in minutes): 55   Total Billable Time (in minutes): 55    FOTO:  Intake Score: Not applicable for this Episode%  Survey Score 2: Not applicable for this Episode%  Survey Score 3: Not applicable for this Episode%    Precautions:         Subjective   Pt states she is doing okay today and has been about the same since the eval. She has been doing her HEP and states she went too far out on LTR one day and felt it in her back, but has no complaints otherwise..  Pain reported as 5/10.      Objective            Treatment:  Therapeutic Exercise  TE 1: LTR small range x 3 min  TE 2: flutters w/ gtb 3x30" B  TE 3: prone press ups on elbows  TE 4: pallof press gtb 2x12 each direction  Manual Therapy  MT 1: STM to R Gmax, piriformis  MT 2: manual lumbar traction w/ BLE on ball  Balance/Neuromuscular Re-Education  NMR 1: TA contraction 10x10"  NMR 2: supine marching w/ TA x3min  NMR 3: TA + glute set/initiatiion of bridge    Time Entry(in minutes):  Manual Therapy Time Entry: 12  Neuromuscular Re-Education Time Entry: 23  Therapeutic Exercise Time Entry: " 20    Assessment & Plan   Assessment: Pt tolerated session well with complaints of min discomfort w/ R UPA that improved w/ prone press ups. Tx focused on manual techniques for symptom modulation, TA motor control and core stabilization. Pt responded best to prone press ups and reported no symptoms while performing them. Pt reported min mm fatigue in lumbar/thoracic musculature following tx session, but had no complaints otherwise.     Evaluation/Treatment Tolerance: Patient tolerated treatment well    The patient will continue to benefit from skilled outpatient physical therapy in order to address the deficits listed in the problem list on the initial evaluation, provide patient and family education, and maximize the patients level of independence in the home and community environments.     The patient's spiritual, cultural, and educational needs were considered, and the patient is agreeable to the plan of care and goals.           Plan: Continue to progress POC as tolerated by pt.    Goals:   Active       Ambulation/movement       Patient will tolerate walking 60+ minutes to demonstrate improved activity tolerance (Progressing)       Start:  07/09/25    Expected End:  09/05/25               Changing body position       Patient will demonstrate moving supine to sit no pain and w/o holding breath (Progressing)       Start:  07/09/25    Expected End:  09/05/25            Patient will perform 5xSTS in 20s or less w/o UE use to demonstrate improved functional mobility.  (Progressing)       Start:  07/09/25    Expected End:  09/05/25               Functional outcome       Patient stated goal: Be able to move normally with no pain  (Progressing)       Start:  07/09/25    Expected End:  09/05/25            Patient will demonstrate independence in home program for support of progression (Progressing)       Start:  07/09/25    Expected End:  08/08/25               Leisure activities       Patient will participate in line  dancing class for entirety of session with no breaks  (Progressing)       Start:  07/09/25    Expected End:  09/05/25               Pain       Patient will report pain of 5/10 at worst, demonstrating a reduction of overall pain (Progressing)       Start:  07/09/25    Expected End:  08/08/25            Patient will report a 2 point reduction in pain while performing STS transfer (Progressing)       Start:  07/09/25    Expected End:  08/08/25               Range of Motion       Patient will achieve spinal flexion to 90% w/ 2/10 pain or less (Progressing)       Start:  07/09/25    Expected End:  09/05/25            Patient will achieve spinal extension to 80% w/ 2/10 pain or less (Progressing)       Start:  07/09/25    Expected End:  09/05/25               Strength       Patient will achieve bilateral hip extension strength of 4+/5 (Progressing)       Start:  07/09/25    Expected End:  09/05/25            Patient will achieve bilateral hip internal rotation strength of 4+/5 in 90 degrees hip flexion (Progressing)       Start:  07/09/25    Expected End:  09/05/25            Patient will achieve bilateral knee flexion strength of 5/5 (Progressing)       Start:  07/09/25    Expected End:  09/05/25                Kingsley Bullard PT

## 2025-07-16 ENCOUNTER — CLINICAL SUPPORT (OUTPATIENT)
Dept: REHABILITATION | Facility: HOSPITAL | Age: 65
End: 2025-07-16
Payer: COMMERCIAL

## 2025-07-16 DIAGNOSIS — M62.81 WEAKNESS OF TRUNK MUSCULATURE: Primary | ICD-10-CM

## 2025-07-16 DIAGNOSIS — R26.89 DECREASED FUNCTIONAL MOBILITY: ICD-10-CM

## 2025-07-16 DIAGNOSIS — R26.2 DIFFICULTY WALKING: ICD-10-CM

## 2025-07-16 PROCEDURE — 97140 MANUAL THERAPY 1/> REGIONS: CPT | Mod: PO,CQ

## 2025-07-16 PROCEDURE — 97112 NEUROMUSCULAR REEDUCATION: CPT | Mod: PO,CQ

## 2025-07-16 PROCEDURE — 97110 THERAPEUTIC EXERCISES: CPT | Mod: PO,CQ

## 2025-07-16 NOTE — PROGRESS NOTES
"  Outpatient Rehab    Physical Therapy Visit    Patient Name: Leschia T Bastian  MRN: [id number]  YOB: 1960  Encounter Date: 7/16/2025    Therapy Diagnosis:   Encounter Diagnoses   Name Primary?    Weakness of trunk musculature Yes    Decreased functional mobility     Difficulty walking      Physician: Cassi Enriquez MD    Physician Orders: Eval and Treat  Medical Diagnosis: Chronic right-sided low back pain without sciatica  Surgical Diagnosis: Not applicable for this Episode   Surgical Date: Not applicable for this Episode  Days Since Last Surgery: Not applicable for this Episode    Visit # / Visits Authorized:  2 / 20  Insurance Authorization Period: 7/8/2025 to 12/31/2025  Date of Evaluation: 7/8/2025  Plan of Care Certification: 7/9/2025 to 9/5/2025      PT/PTA: PTA   Number of PTA visits since last PT visit:1  Time In: 1600   Time Out: 1653  Total Time (in minutes): 53   Total Billable Time (in minutes): 53    FOTO:  Intake Score: Not applicable for this Episode%  Survey Score 2: Not applicable for this Episode%  Survey Score 3: Not applicable for this Episode%    Precautions:         Subjective   Pt states that her back feels about the same at this time w/ usual soreness and tightness.         Objective            Treatment:  Therapeutic Exercise  TE 1: LTR small range x 3 min  TE 2: flutters w/ gtb 3x30" B  TE 3: prone press ups on elbows  TE 4: pallof press gtb 2x12 each direction  TE 5: Seated marches- attempted but unable  TE 6: NuStep 10' LVL 2  Manual Therapy  MT 1: STM to R Gmax, piriformis  MT 2: manual lumbar traction w/ BLE on ball  MT 3: STM to paraspinals  Balance/Neuromuscular Re-Education  NMR 1: TA contraction 10x10"  NMR 2: supine marching w/ TA x3min  NMR 3: TA + glute set/initiatiion of bridge    Time Entry(in minutes):  Manual Therapy Time Entry: 10  Neuromuscular Re-Education Time Entry: 20  Therapeutic Exercise Time Entry: 23    Assessment & Plan   Assessment: Pt tolerated " therapy session well and completed exercise program w/ goals to improve Core Strength/Stability, Increasing Lumbar Mobility, and Functional Mobility. Attempted bridges w/ emphasis on core activation, and pt able to complete exercise w/ short rest breaks due to increasing discomfort. Attempted seated marches as well, however pt unable to tolerate weight shift to affected side w/o increasing pain. Mod verbal and manual cues needed throughout therapy session to promote proper exercise form. All exercises were tolerated w/ mod fatigue and min < > mod complaints of pain. Exercises challenged appropriately. Will continue to progress as tolerated.  Evaluation/Treatment Tolerance: Patient tolerated treatment well    The patient will continue to benefit from skilled outpatient physical therapy in order to address the deficits listed in the problem list on the initial evaluation, provide patient and family education, and maximize the patients level of independence in the home and community environments.     The patient's spiritual, cultural, and educational needs were considered, and the patient is agreeable to the plan of care and goals.           Plan: Continue to progress plan of care as tolerated.    Goals:   Active       Ambulation/movement       Patient will tolerate walking 60+ minutes to demonstrate improved activity tolerance (Progressing)       Start:  07/09/25    Expected End:  09/05/25               Changing body position       Patient will demonstrate moving supine to sit no pain and w/o holding breath (Progressing)       Start:  07/09/25    Expected End:  09/05/25            Patient will perform 5xSTS in 20s or less w/o UE use to demonstrate improved functional mobility.  (Progressing)       Start:  07/09/25    Expected End:  09/05/25               Functional outcome       Patient stated goal: Be able to move normally with no pain  (Progressing)       Start:  07/09/25    Expected End:  09/05/25             Patient will demonstrate independence in home program for support of progression (Progressing)       Start:  07/09/25    Expected End:  08/08/25               Leisure activities       Patient will participate in line dancing class for entirety of session with no breaks  (Progressing)       Start:  07/09/25    Expected End:  09/05/25               Pain       Patient will report pain of 5/10 at worst, demonstrating a reduction of overall pain (Progressing)       Start:  07/09/25    Expected End:  08/08/25            Patient will report a 2 point reduction in pain while performing STS transfer (Progressing)       Start:  07/09/25    Expected End:  08/08/25               Range of Motion       Patient will achieve spinal flexion to 90% w/ 2/10 pain or less (Progressing)       Start:  07/09/25    Expected End:  09/05/25            Patient will achieve spinal extension to 80% w/ 2/10 pain or less (Progressing)       Start:  07/09/25    Expected End:  09/05/25               Strength       Patient will achieve bilateral hip extension strength of 4+/5 (Progressing)       Start:  07/09/25    Expected End:  09/05/25            Patient will achieve bilateral hip internal rotation strength of 4+/5 in 90 degrees hip flexion (Progressing)       Start:  07/09/25    Expected End:  09/05/25            Patient will achieve bilateral knee flexion strength of 5/5 (Progressing)       Start:  07/09/25    Expected End:  09/05/25                Gerard Mcclure PTA

## 2025-07-21 ENCOUNTER — CLINICAL SUPPORT (OUTPATIENT)
Dept: REHABILITATION | Facility: HOSPITAL | Age: 65
End: 2025-07-21
Payer: COMMERCIAL

## 2025-07-21 DIAGNOSIS — M62.81 WEAKNESS OF TRUNK MUSCULATURE: ICD-10-CM

## 2025-07-21 DIAGNOSIS — R26.89 DECREASED FUNCTIONAL MOBILITY: ICD-10-CM

## 2025-07-21 DIAGNOSIS — R26.2 DIFFICULTY WALKING: Primary | ICD-10-CM

## 2025-07-21 PROCEDURE — 97530 THERAPEUTIC ACTIVITIES: CPT | Mod: PO

## 2025-07-21 PROCEDURE — 97112 NEUROMUSCULAR REEDUCATION: CPT | Mod: PO

## 2025-07-21 PROCEDURE — 97110 THERAPEUTIC EXERCISES: CPT | Mod: PO

## 2025-07-22 NOTE — PROGRESS NOTES
Outpatient Rehab    Physical Therapy Visit    Patient Name: Leschia T Bastian  MRN: [id number]  YOB: 1960  Encounter Date: 7/21/2025    Therapy Diagnosis:   Encounter Diagnoses   Name Primary?    Difficulty walking Yes    Decreased functional mobility     Weakness of trunk musculature      Physician: Cassi Enriquez MD    Physician Orders: Eval and Treat  Medical Diagnosis: Chronic right-sided low back pain without sciatica  Surgical Diagnosis: Not applicable for this Episode   Surgical Date: Not applicable for this Episode  Days Since Last Surgery: Not applicable for this Episode    Visit # / Visits Authorized:  3 / 20  Insurance Authorization Period: 7/8/2025 to 12/31/2025  Date of Evaluation: 7/8/2025  Plan of Care Certification: 7/9/2025 to 9/5/2025      PT/PTA: PT   Number of PTA visits since last PT visit:0  Time In: 1707   Time Out: 1755  Total Time (in minutes): 48   Total Billable Time (in minutes): 48    FOTO:  Intake Score (%): Not applicable for this Episode  Survey Score 2 (%): Not applicable for this Episode  Survey Score 3 (%): Not applicable for this Episode    Precautions:         Subjective   Pt states she is doing well today. She got a massage yesterday that helped her a good bit. She has noticed the PT exercixes helping her get around better with less pain alrwady. She continues to do HEP in the mornings and sometimes at night. She plans to continue getting a massage each week, preferably the day before PT sessions..  Pain reported as 4/10.      Objective            Treatment:  Therapeutic Exercise  TE 1: LTR small range x 3 min  TE 3: prone press ups on elbows x20  TE 6: NuStep 10' LVL 2  Balance/Neuromuscular Re-Education  NMR 2: supine marching w/ TA x3min  NMR 3: TA + glute set/initiatiion of bridge  Therapeutic Activity  TA 1: slow marching suitcase carry w/ 10lbs 2x10 each side  TE 4: pallof press + stepaways gtb 2x8 each direction    Time Entry(in minutes):  Neuromuscular  Re-Education Time Entry: 15  Therapeutic Activity Time Entry: 12  Therapeutic Exercise Time Entry: 21    Assessment & Plan   Assessment: Pt tolerated session well with no complaints of pain. Tx focused on symptom modulation, TA control and strengthening of core musculature with functional movements. Pt was able to performing marching suitcase carry with 10lbs with min difficulty and no pain. Pt was able to progress on multiple core stabilization exercises today with no complaints and showed improvements with glute bridges today compared to previous sessions. Will continue to progress exercises as tolerated.   Evaluation/Treatment Tolerance: Patient tolerated treatment well    The patient will continue to benefit from skilled outpatient physical therapy in order to address the deficits listed in the problem list on the initial evaluation, provide patient and family education, and maximize the patients level of independence in the home and community environments.     The patient's spiritual, cultural, and educational needs were considered, and the patient is agreeable to the plan of care and goals.           Plan: Continue to progress POC as tolerated by pt.    Goals:   Active       Ambulation/movement       Patient will tolerate walking 60+ minutes to demonstrate improved activity tolerance (Progressing)       Start:  07/09/25    Expected End:  09/05/25               Changing body position       Patient will demonstrate moving supine to sit no pain and w/o holding breath (Progressing)       Start:  07/09/25    Expected End:  09/05/25            Patient will perform 5xSTS in 20s or less w/o UE use to demonstrate improved functional mobility.  (Progressing)       Start:  07/09/25    Expected End:  09/05/25               Functional outcome       Patient stated goal: Be able to move normally with no pain  (Progressing)       Start:  07/09/25    Expected End:  09/05/25            Patient will demonstrate independence in  home program for support of progression (Progressing)       Start:  07/09/25    Expected End:  08/08/25               Leisure activities       Patient will participate in line dancing class for entirety of session with no breaks  (Progressing)       Start:  07/09/25    Expected End:  09/05/25               Pain       Patient will report pain of 5/10 at worst, demonstrating a reduction of overall pain (Progressing)       Start:  07/09/25    Expected End:  08/08/25            Patient will report a 2 point reduction in pain while performing STS transfer (Progressing)       Start:  07/09/25    Expected End:  08/08/25               Range of Motion       Patient will achieve spinal flexion to 90% w/ 2/10 pain or less (Progressing)       Start:  07/09/25    Expected End:  09/05/25            Patient will achieve spinal extension to 80% w/ 2/10 pain or less (Progressing)       Start:  07/09/25    Expected End:  09/05/25               Strength       Patient will achieve bilateral hip extension strength of 4+/5 (Progressing)       Start:  07/09/25    Expected End:  09/05/25            Patient will achieve bilateral hip internal rotation strength of 4+/5 in 90 degrees hip flexion (Progressing)       Start:  07/09/25    Expected End:  09/05/25            Patient will achieve bilateral knee flexion strength of 5/5 (Progressing)       Start:  07/09/25    Expected End:  09/05/25                Kingsley Bullard PT

## 2025-07-23 ENCOUNTER — CLINICAL SUPPORT (OUTPATIENT)
Dept: REHABILITATION | Facility: HOSPITAL | Age: 65
End: 2025-07-23
Payer: COMMERCIAL

## 2025-07-23 DIAGNOSIS — M62.81 WEAKNESS OF TRUNK MUSCULATURE: ICD-10-CM

## 2025-07-23 DIAGNOSIS — R26.2 DIFFICULTY WALKING: Primary | ICD-10-CM

## 2025-07-23 DIAGNOSIS — R26.89 DECREASED FUNCTIONAL MOBILITY: ICD-10-CM

## 2025-07-23 PROCEDURE — 97110 THERAPEUTIC EXERCISES: CPT | Mod: PO,CQ

## 2025-07-23 PROCEDURE — 97112 NEUROMUSCULAR REEDUCATION: CPT | Mod: PO,CQ

## 2025-07-23 PROCEDURE — 97140 MANUAL THERAPY 1/> REGIONS: CPT | Mod: PO,CQ

## 2025-07-23 NOTE — PROGRESS NOTES
"  Outpatient Rehab    Physical Therapy Visit    Patient Name: Leschia T Bastian  MRN: [id number]  YOB: 1960  Encounter Date: 7/23/2025    Therapy Diagnosis:   Encounter Diagnoses   Name Primary?    Difficulty walking Yes    Decreased functional mobility     Weakness of trunk musculature      Physician: Cassi Enriquez MD    Physician Orders: Eval and Treat  Medical Diagnosis: Chronic right-sided low back pain without sciatica  Surgical Diagnosis: Not applicable for this Episode   Surgical Date: Not applicable for this Episode  Days Since Last Surgery: Not applicable for this Episode    Visit # / Visits Authorized:  4 / 20  Insurance Authorization Period: 7/8/2025 to 12/31/2025  Date of Evaluation: 7/8/2025  Plan of Care Certification: 7/9/2025 to 9/5/2025      PT/PTA: PTA   Number of PTA visits since last PT visit:1  Time In: 1600   Time Out: 1653  Total Time (in minutes): 53   Total Billable Time (in minutes): 53    FOTO:  Intake Score (%): Not applicable for this Episode  Survey Score 2 (%): Not applicable for this Episode  Survey Score 3 (%): Not applicable for this Episode    Precautions:         Subjective   Pt states that she was having an slight increase of LBP over the last few days and overall soreness of entire back after last session.         Objective            Treatment:  Therapeutic Exercise  TE 1: LTR small range x 3 min  TE 3: prone press ups on elbows x20  TE 4: pallof press + stepaways gtb 2x12 each direction  TE 6: NuStep 10' LVL 2  Manual Therapy  MT 2: manual lumbar traction w/ BLE on ball  Balance/Neuromuscular Re-Education  NMR 1: TA contraction 10x10"  NMR 2: supine marching w/ TA x3min  NMR 3: TA + glute set/initiatiion of bridge  Therapeutic Activity  TA 1: slow marching suitcase carry w/ 10lbs x10 each side    Time Entry(in minutes):  Manual Therapy Time Entry: 15  Neuromuscular Re-Education Time Entry: 15  Therapeutic Exercise Time Entry: 33    Assessment & Plan   Assessment: " Pt tolerated therapy session well and completed exercise program w/o complaint or issue. Pt w/ improvements when performing bridges w/ notable decrease of LBP at this time. Decreased reps of standing marches w/ weight as to not overfatigue pt back and hips to avoid future soreness. Short rest breaks taken throughout session to keep pain and fatigue levels low. Min verbal and manual cues needed throughout therapy session to promote proper exercise form. All exercises were tolerated w/ mod fatigue and min < > mod complaints of pain. Exercises challenged appropriately. Will continue to progress as tolerated.  Evaluation/Treatment Tolerance: Patient tolerated treatment well    The patient will continue to benefit from skilled outpatient physical therapy in order to address the deficits listed in the problem list on the initial evaluation, provide patient and family education, and maximize the patients level of independence in the home and community environments.     The patient's spiritual, cultural, and educational needs were considered, and the patient is agreeable to the plan of care and goals.           Plan: Continue to progress plan of care as tolerated.    Goals:   Active       Ambulation/movement       Patient will tolerate walking 60+ minutes to demonstrate improved activity tolerance (Progressing)       Start:  07/09/25    Expected End:  09/05/25               Changing body position       Patient will demonstrate moving supine to sit no pain and w/o holding breath (Progressing)       Start:  07/09/25    Expected End:  09/05/25            Patient will perform 5xSTS in 20s or less w/o UE use to demonstrate improved functional mobility.  (Progressing)       Start:  07/09/25    Expected End:  09/05/25               Functional outcome       Patient stated goal: Be able to move normally with no pain  (Progressing)       Start:  07/09/25    Expected End:  09/05/25            Patient will demonstrate independence in  home program for support of progression (Progressing)       Start:  07/09/25    Expected End:  08/08/25               Leisure activities       Patient will participate in line dancing class for entirety of session with no breaks  (Progressing)       Start:  07/09/25    Expected End:  09/05/25               Pain       Patient will report pain of 5/10 at worst, demonstrating a reduction of overall pain (Progressing)       Start:  07/09/25    Expected End:  08/08/25            Patient will report a 2 point reduction in pain while performing STS transfer (Progressing)       Start:  07/09/25    Expected End:  08/08/25               Range of Motion       Patient will achieve spinal flexion to 90% w/ 2/10 pain or less (Progressing)       Start:  07/09/25    Expected End:  09/05/25            Patient will achieve spinal extension to 80% w/ 2/10 pain or less (Progressing)       Start:  07/09/25    Expected End:  09/05/25               Strength       Patient will achieve bilateral hip extension strength of 4+/5 (Progressing)       Start:  07/09/25    Expected End:  09/05/25            Patient will achieve bilateral hip internal rotation strength of 4+/5 in 90 degrees hip flexion (Progressing)       Start:  07/09/25    Expected End:  09/05/25            Patient will achieve bilateral knee flexion strength of 5/5 (Progressing)       Start:  07/09/25    Expected End:  09/05/25                Gerard Mcclure PTA

## 2025-07-28 ENCOUNTER — CLINICAL SUPPORT (OUTPATIENT)
Dept: REHABILITATION | Facility: HOSPITAL | Age: 65
End: 2025-07-28
Payer: COMMERCIAL

## 2025-07-28 DIAGNOSIS — R26.2 DIFFICULTY WALKING: ICD-10-CM

## 2025-07-28 DIAGNOSIS — M62.81 WEAKNESS OF TRUNK MUSCULATURE: ICD-10-CM

## 2025-07-28 DIAGNOSIS — R26.89 DECREASED FUNCTIONAL MOBILITY: Primary | ICD-10-CM

## 2025-07-28 PROCEDURE — 97110 THERAPEUTIC EXERCISES: CPT | Mod: PO

## 2025-07-28 PROCEDURE — 97112 NEUROMUSCULAR REEDUCATION: CPT | Mod: PO

## 2025-07-28 PROCEDURE — 97530 THERAPEUTIC ACTIVITIES: CPT | Mod: PO

## 2025-07-28 NOTE — PROGRESS NOTES
"  Outpatient Rehab    Physical Therapy Visit    Patient Name: Leschia T Bastian  MRN: [id number]  YOB: 1960  Encounter Date: 7/28/2025    Therapy Diagnosis:   Encounter Diagnoses   Name Primary?    Decreased functional mobility Yes    Difficulty walking     Weakness of trunk musculature      Physician: Cassi Enriquez MD    Physician Orders: Eval and Treat  Medical Diagnosis: Chronic right-sided low back pain without sciatica  Surgical Diagnosis: Not applicable for this Episode   Surgical Date: Not applicable for this Episode  Days Since Last Surgery: Not applicable for this Episode    Visit # / Visits Authorized:  5 / 20  Insurance Authorization Period: 7/8/2025 to 12/31/2025  Date of Evaluation: 7/8/2025  Plan of Care Certification: 7/9/2025 to 9/5/2025      PT/PTA: PT   Number of PTA visits since last PT visit:0  Time In: 1702   Time Out: 1757  Total Time (in minutes): 55   Total Billable Time (in minutes): 55    FOTO:  Intake Score (%): 52  Survey Score 2 (%): Not applicable for this Episode  Survey Score 3 (%): Not applicable for this Episode    Precautions:         Subjective   Pt states she is doing okay today. She felt minor soreness following last session that eased over the next couple of days, but had no c/o increased pain..  Pain reported as 3/10.      Objective            Treatment:  Therapeutic Exercise  TE 1: LTR small range x 3 min  TE 6: NuStep 10' LVL 2  TE 7: trunk SB w/ rtb 2x10 B  TE 8: pt ed: appropriate response to exercise in regards to pain/soreness, importance of continued exercise outside of skilled PT, strength vs endurance training for core stabilization   Balance/Neuromuscular Re-Education  NMR 1: TA contraction 10x10"  NMR 2: supine marching w/ TA 2x20  NMR 3: TA + glute set/initiatiion of bridge  Therapeutic Activity  TA 2: STS w/ yellow ball from chair + foam pad 2x10, 1x10 w/o ball    Time Entry(in minutes):  Neuromuscular Re-Education Time Entry: 15  Therapeutic " Activity Time Entry: 10  Therapeutic Exercise Time Entry: 30    Assessment & Plan   Assessment: Pt tolerated session well with no complaints of increased pain. Tx focused on lumbar mobility, cardiovascular exercise, TA motor control, and strengthening of core/BLE musculature. Pt demonstrated increased height of bridging today compared to previous sessions with noticeable lift from surface. Pt was able to perform STS activity with reports of fatigue, but no pain. Slightly increased trunk flx during STS was observed. Will continue to progress exercises as tolerated by pt.   Evaluation/Treatment Tolerance: Patient tolerated treatment well    The patient will continue to benefit from skilled outpatient physical therapy in order to address the deficits listed in the problem list on the initial evaluation, provide patient and family education, and maximize the patients level of independence in the home and community environments.     The patient's spiritual, cultural, and educational needs were considered, and the patient is agreeable to the plan of care and goals.           Plan: Continue to progress plan of care as tolerated.    Goals:   Active       Ambulation/movement       Patient will tolerate walking 60+ minutes to demonstrate improved activity tolerance (Progressing)       Start:  07/09/25    Expected End:  09/05/25               Changing body position       Patient will demonstrate moving supine to sit no pain and w/o holding breath (Progressing)       Start:  07/09/25    Expected End:  09/05/25            Patient will perform 5xSTS in 20s or less w/o UE use to demonstrate improved functional mobility.  (Progressing)       Start:  07/09/25    Expected End:  09/05/25               Functional outcome       Patient stated goal: Be able to move normally with no pain  (Progressing)       Start:  07/09/25    Expected End:  09/05/25            Patient will demonstrate independence in home program for support of  progression (Progressing)       Start:  07/09/25    Expected End:  08/08/25               Leisure activities       Patient will participate in line dancing class for entirety of session with no breaks  (Progressing)       Start:  07/09/25    Expected End:  09/05/25               Pain       Patient will report pain of 5/10 at worst, demonstrating a reduction of overall pain (Progressing)       Start:  07/09/25    Expected End:  08/08/25            Patient will report a 2 point reduction in pain while performing STS transfer (Progressing)       Start:  07/09/25    Expected End:  08/08/25               Range of Motion       Patient will achieve spinal flexion to 90% w/ 2/10 pain or less (Progressing)       Start:  07/09/25    Expected End:  09/05/25            Patient will achieve spinal extension to 80% w/ 2/10 pain or less (Progressing)       Start:  07/09/25    Expected End:  09/05/25               Strength       Patient will achieve bilateral hip extension strength of 4+/5 (Progressing)       Start:  07/09/25    Expected End:  09/05/25            Patient will achieve bilateral hip internal rotation strength of 4+/5 in 90 degrees hip flexion (Progressing)       Start:  07/09/25    Expected End:  09/05/25            Patient will achieve bilateral knee flexion strength of 5/5 (Progressing)       Start:  07/09/25    Expected End:  09/05/25                Kingsley Bullard PT

## 2025-08-04 ENCOUNTER — CLINICAL SUPPORT (OUTPATIENT)
Dept: REHABILITATION | Facility: HOSPITAL | Age: 65
End: 2025-08-04
Payer: COMMERCIAL

## 2025-08-04 DIAGNOSIS — M62.81 WEAKNESS OF TRUNK MUSCULATURE: Primary | ICD-10-CM

## 2025-08-04 DIAGNOSIS — R26.89 DECREASED FUNCTIONAL MOBILITY: ICD-10-CM

## 2025-08-04 DIAGNOSIS — R26.2 DIFFICULTY WALKING: ICD-10-CM

## 2025-08-04 PROCEDURE — 97110 THERAPEUTIC EXERCISES: CPT | Mod: PO

## 2025-08-04 PROCEDURE — 97112 NEUROMUSCULAR REEDUCATION: CPT | Mod: PO

## 2025-08-05 NOTE — PROGRESS NOTES
"  Outpatient Rehab    Physical Therapy Visit    Patient Name: Leschia T Bastian  MRN: [id number]  YOB: 1960  Encounter Date: 8/4/2025    Therapy Diagnosis:   Encounter Diagnoses   Name Primary?    Weakness of trunk musculature Yes    Difficulty walking     Decreased functional mobility      Physician: Cassi Enriquez MD    Physician Orders: Eval and Treat  Medical Diagnosis: Chronic right-sided low back pain without sciatica  Surgical Diagnosis: Not applicable for this Episode   Surgical Date: Not applicable for this Episode  Days Since Last Surgery: Not applicable for this Episode    Visit # / Visits Authorized:  6 / 20  Insurance Authorization Period: 7/8/2025 to 12/31/2025  Date of Evaluation: 7/8/2025  Plan of Care Certification: 7/9/2025 to 9/5/2025      PT/PTA: PT   Number of PTA visits since last PT visit:0  Time In: 1704   Time Out: 1759  Total Time (in minutes): 55   Total Billable Time (in minutes): 55    FOTO:  Intake Score (%): 52  Survey Score 2 (%): Not applicable for this Episode  Survey Score 3 (%): Not applicable for this Episode    Precautions:         Subjective   Pt states she is doing well today outside of feelings of stiffness. Had no complaints following last session. Got a massage over the weekend and felt less tension..  Pain reported as 2/10.      Objective            Treatment:  Therapeutic Exercise  TE 1: LTR small range x 3 min  TE 2: flutters w/ gtb 3x30" B  TE 6: NuStep 10' LVL 2  TE 7: trunk SB w/ rtb 2x10 B  Balance/Neuromuscular Re-Education  NMR 1: TA contraction 10x10"  NMR 2: supine marching w/ TA x30  NMR 3: TA + glute set/initiatiion of bridge  NMR 4: marching w/ pulldown iso w/ rtb x20  NMR 5: quadruped kickbakcs 2x10    Time Entry(in minutes):  Neuromuscular Re-Education Time Entry: 25  Therapeutic Exercise Time Entry: 30    Assessment & Plan   Assessment: Pt tolerated session well with no complaints of increased pain. Tx focused on lumbar mobility, cardiovascular " exercise, TA motor control, and strengthening of core/BLE musculature. Pt continues to demonstrate improved bridging with strength and tolerance for the exercise. Pt performed controlled marching with resisted pulldowns for core stabilization with min/mod difficulty. Pt reported mild mm fatigue at conclusion of session, but no increase in pain levels. Will continue to progress exercises as tolerated by pt.   Evaluation/Treatment Tolerance: Patient tolerated treatment well    The patient will continue to benefit from skilled outpatient physical therapy in order to address the deficits listed in the problem list on the initial evaluation, provide patient and family education, and maximize the patients level of independence in the home and community environments.     The patient's spiritual, cultural, and educational needs were considered, and the patient is agreeable to the plan of care and goals.           Plan: Continue to progress plan of care as tolerated.    Goals:   Active       Ambulation/movement       Patient will tolerate walking 60+ minutes to demonstrate improved activity tolerance (Progressing)       Start:  07/09/25    Expected End:  09/05/25               Changing body position       Patient will demonstrate moving supine to sit no pain and w/o holding breath (Progressing)       Start:  07/09/25    Expected End:  09/05/25            Patient will perform 5xSTS in 20s or less w/o UE use to demonstrate improved functional mobility.  (Progressing)       Start:  07/09/25    Expected End:  09/05/25               Functional outcome       Patient stated goal: Be able to move normally with no pain  (Progressing)       Start:  07/09/25    Expected End:  09/05/25            Patient will demonstrate independence in home program for support of progression (Progressing)       Start:  07/09/25    Expected End:  08/08/25               Leisure activities       Patient will participate in line dancing class for entirety  of session with no breaks  (Progressing)       Start:  07/09/25    Expected End:  09/05/25               Pain       Patient will report pain of 5/10 at worst, demonstrating a reduction of overall pain (Progressing)       Start:  07/09/25    Expected End:  08/08/25            Patient will report a 2 point reduction in pain while performing STS transfer (Progressing)       Start:  07/09/25    Expected End:  08/08/25               Range of Motion       Patient will achieve spinal flexion to 90% w/ 2/10 pain or less (Progressing)       Start:  07/09/25    Expected End:  09/05/25            Patient will achieve spinal extension to 80% w/ 2/10 pain or less (Progressing)       Start:  07/09/25    Expected End:  09/05/25               Strength       Patient will achieve bilateral hip extension strength of 4+/5 (Progressing)       Start:  07/09/25    Expected End:  09/05/25            Patient will achieve bilateral hip internal rotation strength of 4+/5 in 90 degrees hip flexion (Progressing)       Start:  07/09/25    Expected End:  09/05/25            Patient will achieve bilateral knee flexion strength of 5/5 (Progressing)       Start:  07/09/25    Expected End:  09/05/25                Kingsley Bullard PT

## 2025-08-08 ENCOUNTER — OFFICE VISIT (OUTPATIENT)
Dept: PAIN MEDICINE | Facility: CLINIC | Age: 65
End: 2025-08-08
Payer: COMMERCIAL

## 2025-08-08 DIAGNOSIS — M54.50 CHRONIC RIGHT-SIDED LOW BACK PAIN WITHOUT SCIATICA: Primary | ICD-10-CM

## 2025-08-08 DIAGNOSIS — M79.10 MYALGIA: ICD-10-CM

## 2025-08-08 DIAGNOSIS — G89.29 CHRONIC RIGHT-SIDED LOW BACK PAIN WITHOUT SCIATICA: Primary | ICD-10-CM

## 2025-08-08 NOTE — PROGRESS NOTES
Chronic Pain-Tele-Medicine-Established Note (Follow up visit)      The patient location is: [place]  The chief complaint leading to consultation is: low back pain  Visit type: Virtual visit with synchronous audio and video  Total time spent with patient: 30 minutes  Each patient to whom he or she provides medical services by telemedicine is:  (1) informed of the relationship between the physician and patient and the respective role of any other health care provider with respect to management of the patient; and (2) notified that he or she may decline to receive medical services by telemedicine and may withdraw from such care at any time.    Notes:     SUBJECTIVE:     PCP: David Ya MD    REFERRING PHYSICIAN: No ref. provider found    CHIEF COMPLAINT: right lower back pain    Original HISTORY OF PRESENT ILLNESS: Leschia T Bastian presents to the clinic for the evaluation of the above pain. The pain started 8-10 months.    Original Pain Description:  The pain is located in the right lower back and gluteal region and is axial in nature. She has had sciatica before and it does not feel like that. The pain is described as stabbing and tight band. Exacerbating factors: Laying, Bending, and Extension. The pain is worse during the night when she is sleeping or in bed. Mitigating factors nothing. Symptoms interfere with daily activity and sleeping. The patient feels like symptoms have been worsening. Patient denies night fever/night sweats, bowel incontinence, significant weight loss, significant motor weakness, and loss of sensations. She has had some worsening urinary urgency over the past 3-4 months.    Original PAIN SCORES:  Best: Pain is 5  Worst: Pain is 9  Current: Pain is 5        6/13/2025     1:09 PM   Last 3 PDI Scores   Pain Disability Index (PDI) 31       INTERVAL HISTORY: (Newest visit at the bottom)   Interval History 08/08/2025:   Patient presents to clinic for follow-up of her right lower back  and gluteal pain. Patient is continuing PT sessions. Patient states PT has been helping her pain and states that she is able to turn over in bed while sleeping which is improvement compared to previous visit. Patient states PT provided 50% relief of her pain. Patient has been receiving body massages which she feels has been helping pain. Pain rated currently 7/10, usually 6-7/10 since last visit. Pain localized to right lower back and gluteal region. Patient has not done XRs of l-spine, sacrum and coccyx yet due to scheduling problems with work and far distance from [facility]. Patient takes methocarbamol and tizanidine. She does not try to take tinzanidine often due to potency and making her feel sleepy, she usually breaks one tablet in half and takes the half tablet at night PRN as needed. Patient states tizanidine helps her fall asleep and with pain. Patient also takes tylenol muscle and pain OTC every other day in evening which helps with pain. Patient denies recent falls, denies bowel incontinence and denies saddle anesthesia. She describes continuing presence of urinary urgency.        6 weeks of Conservative therapy:  PT: Started 06/13/2025 till present  Chiro: No  HEP: 2 sessions of 1 hour Line Dance lessons      Treatments / Medications: (Ice/Heat/NSAIDS/APAP/etc):  Methocarbamol (has not tried it yet)  Tizanidine (takes occasionally but makes her feel strange)      Interventional Pain Procedures: (Previous injections)  Distant prior back injection    Past Medical History:   Diagnosis Date    Acute on chronic cholecystitis 03/19/2014    Formatting of this note might be different from the original.   dx update   IMO Update  dx update   IMO April 2016      Allergy     Anemia 05/23/2024    Angioedema 05/23/2024    Atypical chest pain     Cervical radiculopathy     history of, no current problems    Constipation     COVID-19     General anesthetics causing adverse effect in therapeutic use     GERD  (gastroesophageal reflux disease)     Hypertension     Insomnia     Kidney cyst, acquired 01/28/2021    Lumbar radiculopathy     JAYLON on CPAP     S/P ROLDAN-BSO 06/25/2018    Seasonal allergies     Shortness of breath     Urticaria, idiopathic 05/23/2024    Vitamin D deficiency      Past Surgical History:   Procedure Laterality Date    AXILLARY HIDRADENITIS EXCISION Bilateral     BUNIONECTOMY Bilateral     COLONOSCOPY      several    CYSTOSCOPY  06/22/2018    Procedure: CYSTOSCOPY;  Surgeon: Best Jefferson MD;  Location: [facility] OR;  Service: Urology;;    ESOPHAGOGASTRODUODENOSCOPY      x2    ESOPHAGOGASTRODUODENOSCOPY N/A 11/09/2023    Procedure: EGD (ESOPHAGOGASTRODUODENOSCOPY);  Surgeon: Lilly Lagos MD;  Location: [facility];  Service: Endoscopy;  Laterality: N/A;    GALLBLADDER SURGERY      HYSTERECTOMY      LAPAROSCOPIC TOTAL HYSTERECTOMY N/A 06/22/2018    Procedure: HYSTERECTOMY, TOTAL, LAPAROSCOPIC, ATTEMPTED;  Surgeon: Nidia Bradley MD;  Location: [facility];  Service: OB/GYN;  Laterality: N/A;  attempted    LYSIS OF ADHESIONS OF URETER Right 06/22/2018    Procedure: URETEROLYSIS;  Surgeon: Best Jefferson MD;  Location: [facility];  Service: Urology;  Laterality: Right;    TOTAL ABDOMINAL HYSTERECTOMY W/ BILATERAL SALPINGOOPHORECTOMY Bilateral 06/22/2018    TUBAL LIGATION      WISDOM TOOTH EXTRACTION       Social History[1]  Family History   Problem Relation Name Age of Onset    Breast cancer Mother  53    Cancer Mother          breast cancer    Hypertension Mother      Glaucoma Father      Hypertension Father      Hypertension Sister      Breast cancer Sister  52    Colon cancer Brother  46    Heart disease Maternal Grandfather      Heart disease Paternal Grandfather      Ovarian cancer Cousin mat second     Cancer Cousin mat second         ovarian    Breast cancer Maternal Cousin mat third cousin     Breast cancer Maternal Cousin mat third     Eczema Daughter         Review of patient's allergies  indicates:   Allergen Reactions    Clams      Allergy testing confirmed    Codeine Itching and Other (See Comments)    Scallops     Shrimp      Peeling shrimp, got a rash, allergy test confirmed       Current Outpatient Medications   Medication Sig    (Magic mouthwash) 1:1:1 diphenhydrAMINE(Benadryl) 12.5mg/5ml liq, aluminum & magnesium hydroxide-simethicone (Maalox), LIDOcaine viscous 2% Swish and spit 15 mLs every 4 (four) hours as needed (heartburn).    BIOTIN ORAL Take 1 capsule by mouth Daily.    EPINEPHrine 0.3 mg/0.3 mL Syrg Use as needed for anaphylaxis    ergocalciferol (VITAMIN D2) 50,000 unit Cap Take 50,000 Units by mouth every 7 days. (Patient not taking: Reported on 6/2/2025)    fluticasone propionate (FLONASE) 50 mcg/actuation nasal spray 2 sprays by Each Nostril route. (Patient not taking: Reported on 6/13/2025)    linaCLOtide (LINZESS) 72 mcg Cap capsule Take 1 capsule (72 mcg total) by mouth once daily. (Patient not taking: Reported on 6/13/2025)    methocarbamoL (ROBAXIN) 500 MG Tab Take 1 tablet (500 mg total) by mouth 3 (three) times daily as needed (lower back pain). (Patient not taking: Reported on 6/13/2025)    GEOVANY MAHMOOD, 100 % Drop  (Patient not taking: Reported on 6/13/2025)    promethazine-dextromethorphan (PROMETHAZINE-DM) 6.25-15 mg/5 mL Syrp Take by mouth. (Patient not taking: Reported on 6/13/2025)    RABEprazole (ACIPHEX) 20 mg tablet Take 1 tablet (20 mg total) by mouth 2 (two) times daily.    tiZANidine (ZANAFLEX) 4 MG tablet Take 1 tablet (4 mg total) by mouth nightly.    valsartan-hydrochlorothiazide (DIOVAN-HCT) 160-12.5 mg per tablet Take 1 tablet by mouth once daily.     No current facility-administered medications for this visit.       ROS:  GENERAL: No fever. No chills. No fatigue. Denies weight loss. Denies weight gain.  HEENT: Denies headaches. Denies vision change. Denies eye pain. Denies double vision. Denies ear pain.   CV: Denies chest pain.   PULM: Denies of shortness  of breath.  GI: Denies constipation. No diarrhea. No abdominal pain. Denies nausea. Denies vomiting. No blood in stool.  HEME: Denies bleeding problems.  : Denies urgency. No painful urination. No blood in urine.  MS: Denies joint stiffness. Denies joint swelling.  Denies back pain.  SKIN: Denies rash.   NEURO: Denies seizures. No weakness.  PSYCH:  Denies difficulty sleeping. No anxiety. Denies depression. No suicidal thoughts.       VITALS:   There were no vitals filed for this visit.      PHYSICAL EXAM (virtual visit):  General appearance: Well appearing, in no acute distress, alert and oriented x3.  Psych:  Mood and affect appropriate.     PHYSICAL EXAM (from previous visit):   GENERAL: Well appearing, in no acute distress, alert and oriented x3.  PSYCH:  Mood and affect appropriate.  SKIN: Skin color, texture, turgor normal, no rashes or lesions.  HEENT:  Normocephalic, atraumatic. Cranial nerves grossly intact.  NECK: No pain to palpation over the cervical paraspinous muscles. No pain to palpation over facets. No pain with neck flexion, extension, or lateral flexion.   PULM: No evidence of respiratory difficulty, symmetric chest rise.  GI:  Non-distended  BACK: Normal range of motion. No pain to palpation over the spinous processes. No pain to palpation over facet joints. There is pain with palpation over the right sacroiliac joint. Right sided positive Orville's test, compression and distraction tests. When patient is asked where pain is she points to the SI joint on the R side.  EXTREMITIES: No deformities, edema, or skin discoloration.   MUSCULOSKELETAL: Shoulder, hip, and knee provocative maneuvers are negative. No atrophy is noted. Right GTB tenderness.   NEURO: Sensation is equal and appropriate bilaterally. Bilateral upper and lower extremity strength is normal and symmetric. Bilateral upper and lower extremity coordination and muscle stretch reflexes are physiologic and symmetric. Plantar response are  downgoing. Straight leg raising in the supine position is negative to radicular pain.   GAIT: normal.      LABS:      IMAGING:    No Spine imaging    ASSESSMENT: 64 y.o. year old female with pain, consistent with:    Encounter Diagnoses   Name Primary?    Chronic right-sided low back pain without sciatica Yes    Myalgia          DISCUSSION: Leschia T Bastian is a 64-year-old woman who comes to us with right lower back pain which is worst with laying down and sleeping. No spine imaging available. Exam shows pain reproduced with palpation of the Right SI joint and she has positive provacative maneuvers suggestive of Right sided sacroiliitis, R GTB, and piriformis area. Patient has had notable improvement in pain with PT and conservative measures.       PLAN:  Continue PT and progress to a home exercise program  Can continue current medications for management as they are not causing negative side effects: (Tizanidine, methocarbamol).  Obtain XR of l-spine, sacrum and coccyx.  RTC in 2-3 months or as needed      Mp Granados  08/08/2025             [1]   Social History  Socioeconomic History    Marital status:     Number of children: 2   Tobacco Use    Smoking status: Never    Smokeless tobacco: Never   Substance and Sexual Activity    Alcohol use: Yes     Comment: occasional daiquiri    Drug use: Never    Sexual activity: Yes     Partners: Male   Social History Narrative    Patient is originally from [place]        School at ; [employer] ; [employer]         Working ;  at [employer]         //e            Children    vionne -girl     Tanner - boy        Lives with     Mother, father            Diet/Exericse         Social Drivers of Health     Financial Resource Strain: Medium Risk (6/2/2025)    Overall Financial Resource Strain (CARDIA)     Difficulty of Paying Living Expenses: Somewhat hard   Food Insecurity: No Food Insecurity (6/2/2025)    Hunger Vital Sign     Worried About  Running Out of Food in the Last Year: Never true     Ran Out of Food in the Last Year: Never true   Transportation Needs: No Transportation Needs (6/2/2025)    PRAPARE - Transportation     Lack of Transportation (Medical): No     Lack of Transportation (Non-Medical): No   Physical Activity: Insufficiently Active (6/2/2025)    Exercise Vital Sign     Days of Exercise per Week: 2 days     Minutes of Exercise per Session: 60 min   Stress: No Stress Concern Present (6/2/2025)    [employer] - Occupational Stress Questionnaire     Feeling of Stress : Only a little   Housing Stability: Low Risk  (6/2/2025)    Housing Stability Vital Sign     Unable to Pay for Housing in the Last Year: No     Homeless in the Last Year: No

## 2025-08-13 ENCOUNTER — CLINICAL SUPPORT (OUTPATIENT)
Dept: REHABILITATION | Facility: HOSPITAL | Age: 65
End: 2025-08-13
Payer: COMMERCIAL

## 2025-08-13 DIAGNOSIS — R26.89 DECREASED FUNCTIONAL MOBILITY: ICD-10-CM

## 2025-08-13 DIAGNOSIS — R26.2 DIFFICULTY WALKING: ICD-10-CM

## 2025-08-13 DIAGNOSIS — M62.81 WEAKNESS OF TRUNK MUSCULATURE: Primary | ICD-10-CM

## 2025-08-13 PROCEDURE — 97140 MANUAL THERAPY 1/> REGIONS: CPT | Mod: PO

## 2025-08-13 PROCEDURE — 97110 THERAPEUTIC EXERCISES: CPT | Mod: PO

## 2025-08-13 PROCEDURE — 97112 NEUROMUSCULAR REEDUCATION: CPT | Mod: PO

## 2025-08-20 ENCOUNTER — CLINICAL SUPPORT (OUTPATIENT)
Dept: REHABILITATION | Facility: HOSPITAL | Age: 65
End: 2025-08-20
Payer: COMMERCIAL

## 2025-08-20 DIAGNOSIS — R26.89 DECREASED FUNCTIONAL MOBILITY: ICD-10-CM

## 2025-08-20 DIAGNOSIS — M62.81 WEAKNESS OF TRUNK MUSCULATURE: Primary | ICD-10-CM

## 2025-08-20 DIAGNOSIS — R26.2 DIFFICULTY WALKING: ICD-10-CM

## 2025-08-20 PROCEDURE — 97110 THERAPEUTIC EXERCISES: CPT | Mod: PO

## 2025-08-20 PROCEDURE — 97112 NEUROMUSCULAR REEDUCATION: CPT | Mod: PO

## 2025-08-23 ENCOUNTER — HOSPITAL ENCOUNTER (OUTPATIENT)
Dept: RADIOLOGY | Facility: HOSPITAL | Age: 65
Discharge: HOME | End: 2025-08-23
Attending: FAMILY MEDICINE
Payer: COMMERCIAL

## 2025-08-23 DIAGNOSIS — Z12.31 ENCOUNTER FOR SCREENING MAMMOGRAM FOR MALIGNANT NEOPLASM OF BREAST: ICD-10-CM

## 2025-08-23 PROCEDURE — 77063 BREAST TOMOSYNTHESIS BI: CPT | Mod: TC

## 2025-08-23 PROCEDURE — 77063 BREAST TOMOSYNTHESIS BI: CPT | Mod: 26,,, | Performed by: RADIOLOGY

## 2025-08-23 PROCEDURE — 77067 SCR MAMMO BI INCL CAD: CPT | Mod: 26,,, | Performed by: RADIOLOGY

## 2025-08-26 ENCOUNTER — OFFICE VISIT (OUTPATIENT)
Dept: OBSTETRICS AND GYNECOLOGY | Facility: CLINIC | Age: 65
End: 2025-08-26
Attending: FAMILY MEDICINE
Payer: COMMERCIAL

## 2025-08-26 ENCOUNTER — HOSPITAL ENCOUNTER (OUTPATIENT)
Dept: RADIOLOGY | Facility: HOSPITAL | Age: 65
Discharge: HOME | End: 2025-08-26
Attending: ANESTHESIOLOGY
Payer: COMMERCIAL

## 2025-08-26 VITALS
HEIGHT: 69 IN | SYSTOLIC BLOOD PRESSURE: 154 MMHG | DIASTOLIC BLOOD PRESSURE: 100 MMHG | WEIGHT: 193.13 LBS | BODY MASS INDEX: 28.6 KG/M2

## 2025-08-26 DIAGNOSIS — Z01.419 ROUTINE GYNECOLOGICAL EXAMINATION: Primary | ICD-10-CM

## 2025-08-26 DIAGNOSIS — M54.50 CHRONIC RIGHT-SIDED LOW BACK PAIN WITHOUT SCIATICA: ICD-10-CM

## 2025-08-26 DIAGNOSIS — R39.15 URINARY URGENCY: ICD-10-CM

## 2025-08-26 DIAGNOSIS — N95.8 GENITOURINARY SYNDROME OF MENOPAUSE: ICD-10-CM

## 2025-08-26 DIAGNOSIS — Z90.710 H/O TOTAL HYSTERECTOMY: ICD-10-CM

## 2025-08-26 DIAGNOSIS — G89.29 CHRONIC RIGHT-SIDED LOW BACK PAIN WITHOUT SCIATICA: ICD-10-CM

## 2025-08-26 PROCEDURE — 99387 INIT PM E/M NEW PAT 65+ YRS: CPT | Mod: S$GLB,,, | Performed by: NURSE PRACTITIONER

## 2025-08-26 PROCEDURE — 72114 X-RAY EXAM L-S SPINE BENDING: CPT | Mod: 26,,, | Performed by: RADIOLOGY

## 2025-08-26 PROCEDURE — 72114 X-RAY EXAM L-S SPINE BENDING: CPT | Mod: TC

## 2025-08-26 PROCEDURE — 99999 PR PBB SHADOW E&M-EST. PATIENT-LVL III: CPT | Mod: PBBFAC,,, | Performed by: NURSE PRACTITIONER

## 2025-08-27 RX ORDER — ESTRADIOL 4 UG/1
1 INSERT VAGINAL NIGHTLY
Qty: 14 EACH | Refills: 0 | Status: SHIPPED | OUTPATIENT
Start: 2025-08-27 | End: 2025-09-10

## 2025-08-27 RX ORDER — ESTRADIOL 4 UG/1
1 INSERT VAGINAL
Qty: 24 EACH | Refills: 3 | Status: SHIPPED | OUTPATIENT
Start: 2025-08-28 | End: 2026-08-28

## 2025-09-02 ENCOUNTER — TELEPHONE (OUTPATIENT)
Dept: RADIOLOGY | Facility: HOSPITAL | Age: 65
End: 2025-09-02
Payer: COMMERCIAL

## 2025-09-03 ENCOUNTER — HOSPITAL ENCOUNTER (OUTPATIENT)
Dept: RADIOLOGY | Facility: HOSPITAL | Age: 65
Discharge: HOME | End: 2025-09-03
Attending: FAMILY MEDICINE
Payer: COMMERCIAL

## 2025-09-03 DIAGNOSIS — R92.8 ABNORMAL FINDING ON BREAST IMAGING: ICD-10-CM

## 2025-09-03 PROCEDURE — 77061 BREAST TOMOSYNTHESIS UNI: CPT | Mod: 26,LT,, | Performed by: RADIOLOGY

## 2025-09-03 PROCEDURE — 77065 DX MAMMO INCL CAD UNI: CPT | Mod: 26,LT,, | Performed by: RADIOLOGY

## 2025-09-03 PROCEDURE — 77061 BREAST TOMOSYNTHESIS UNI: CPT | Mod: TC,LT

## (undated) DEVICE — GLOVE BIOGEL SKINSENSE PI 6.5

## (undated) DEVICE — SUT VICRYL 4-0 27 RB-1

## (undated) DEVICE — SUT VICRYL PLUS 0 CT1 36IN

## (undated) DEVICE — GLOVE BIOGEL SKINSENSE PI 7.0

## (undated) DEVICE — TIP RUMI KOH-EFFICIENT 4.0

## (undated) DEVICE — SUT VICRYL+ 27 UR-6 VIOL

## (undated) DEVICE — DRESSING TRANS 4X4 TEGADERM

## (undated) DEVICE — TROCAR ENDOPATH XCEL 5X100MM

## (undated) DEVICE — TIP RUMI KOH-EFFICIENT 3.5

## (undated) DEVICE — TIP RUMI BLUE DISPOSABLE 5/BX

## (undated) DEVICE — FOOTSWITCH SUCTION IRRIGATION

## (undated) DEVICE — ELECTRODE ELECSURG LAPSCP SPAT

## (undated) DEVICE — SUT MCRYL PLUS 4-0 PS2 27IN

## (undated) DEVICE — SEE MEDLINE ITEM 157117

## (undated) DEVICE — LOOP VESSEL YELLOW MAXI

## (undated) DEVICE — DRESSING MEPORE PRO 6 X 7 CM

## (undated) DEVICE — DRAPE CAMERA/VIDEO 5 X 96

## (undated) DEVICE — GUIDEWIRE NITINOL HYBRID 150CM

## (undated) DEVICE — TROCAR ENDOPATH XCEL 11MM 10CM

## (undated) DEVICE — FORCEP 5MM BIPOLAR CUT EVEREST

## (undated) DEVICE — SYR 50CC LL

## (undated) DEVICE — SEE MEDLINE ITEM 152622

## (undated) DEVICE — SUT 3/0 27IN PLAIN GUT CT-1

## (undated) DEVICE — ELECTRODE BLADE TEFLON 6

## (undated) DEVICE — UNDERGLOVES BIOGEL PI SZ 7 LF

## (undated) DEVICE — KIT WING PAD POSITIONING

## (undated) DEVICE — SEE MEDLINE ITEM 156930

## (undated) DEVICE — SEE MEDLINE ITEM 146313

## (undated) DEVICE — WARMER DRAPE STERILE LF

## (undated) DEVICE — NDL INSUF ULTRA VERESS 120MM

## (undated) DEVICE — CHLORAPREP W TINT 26ML APPL

## (undated) DEVICE — CLOSURE SKIN STERI STRIP 1/2X4

## (undated) DEVICE — DRAPE STERI INSTRUMENT 1018

## (undated) DEVICE — CANNULA ENDOPATH XCEL 5X100MM

## (undated) DEVICE — SUT 0 8-27IN VICRYL PL CT-1

## (undated) DEVICE — SOL CLEARIFY VISUALIZATION LAP

## (undated) DEVICE — JELLY KY LUBRICATING 5G PACKET

## (undated) DEVICE — SYR 10CC LUER LOCK

## (undated) DEVICE — SUT 1 36IN PDS II VIO MONO

## (undated) DEVICE — SPONGE LAP 18X18 PREWASHED

## (undated) DEVICE — PACK LAPAROSCOPY BAPTIST

## (undated) DEVICE — SCISSOR 5MMX35CM DIRECT DRIVE

## (undated) DEVICE — ELECTRODE REM PLYHSV RETURN 9

## (undated) DEVICE — SOL 9P NACL IRR PIC IL

## (undated) DEVICE — DRAPE STERI LONG

## (undated) DEVICE — Device

## (undated) DEVICE — UNDERGLOVE BIOGEL PI SZ 6.5 LF

## (undated) DEVICE — BLADE SURG STAINLESS STEEL #10